# Patient Record
Sex: MALE | URBAN - METROPOLITAN AREA
[De-identification: names, ages, dates, MRNs, and addresses within clinical notes are randomized per-mention and may not be internally consistent; named-entity substitution may affect disease eponyms.]

---

## 2017-02-23 PROBLEM — Z00.00 ENCOUNTER FOR PREVENTIVE HEALTH EXAMINATION: Status: ACTIVE | Noted: 2017-02-23

## 2017-03-17 ENCOUNTER — OUTPATIENT (OUTPATIENT)
Dept: OUTPATIENT SERVICES | Facility: HOSPITAL | Age: 82
LOS: 1 days | Discharge: HOME | End: 2017-03-17

## 2017-03-23 ENCOUNTER — APPOINTMENT (OUTPATIENT)
Dept: UROLOGY | Facility: CLINIC | Age: 82
End: 2017-03-23

## 2017-06-15 ENCOUNTER — APPOINTMENT (OUTPATIENT)
Dept: UROLOGY | Facility: CLINIC | Age: 82
End: 2017-06-15

## 2017-06-15 VITALS
SYSTOLIC BLOOD PRESSURE: 135 MMHG | WEIGHT: 147 LBS | BODY MASS INDEX: 20.58 KG/M2 | DIASTOLIC BLOOD PRESSURE: 69 MMHG | HEART RATE: 56 BPM | HEIGHT: 71 IN

## 2017-06-15 DIAGNOSIS — F17.200 NICOTINE DEPENDENCE, UNSPECIFIED, UNCOMPLICATED: ICD-10-CM

## 2017-06-15 DIAGNOSIS — Z86.79 PERSONAL HISTORY OF OTHER DISEASES OF THE CIRCULATORY SYSTEM: ICD-10-CM

## 2017-06-15 RX ORDER — ASPIRIN 325 MG/1
325 TABLET, FILM COATED ORAL
Refills: 0 | Status: ACTIVE | COMMUNITY

## 2017-06-15 RX ORDER — PRAVASTATIN SODIUM 80 MG/1
TABLET ORAL
Refills: 0 | Status: ACTIVE | COMMUNITY

## 2017-06-15 RX ORDER — SOLIFENACIN SUCCINATE 5 MG/1
5 TABLET, FILM COATED ORAL
Refills: 0 | Status: ACTIVE | COMMUNITY

## 2017-06-27 DIAGNOSIS — R97.21 RISING PSA FOLLOWING TREATMENT FOR MALIGNANT NEOPLASM OF PROSTATE: ICD-10-CM

## 2017-07-20 ENCOUNTER — APPOINTMENT (OUTPATIENT)
Dept: UROLOGY | Facility: CLINIC | Age: 82
End: 2017-07-20

## 2017-07-20 VITALS
BODY MASS INDEX: 20.58 KG/M2 | DIASTOLIC BLOOD PRESSURE: 70 MMHG | HEIGHT: 71 IN | HEART RATE: 62 BPM | SYSTOLIC BLOOD PRESSURE: 120 MMHG | WEIGHT: 147 LBS

## 2017-10-26 ENCOUNTER — APPOINTMENT (OUTPATIENT)
Dept: UROLOGY | Facility: CLINIC | Age: 82
End: 2017-10-26
Payer: MEDICARE

## 2017-10-26 VITALS
HEIGHT: 71 IN | SYSTOLIC BLOOD PRESSURE: 110 MMHG | DIASTOLIC BLOOD PRESSURE: 62 MMHG | HEART RATE: 53 BPM | BODY MASS INDEX: 20.58 KG/M2 | WEIGHT: 147 LBS

## 2017-10-26 DIAGNOSIS — N32.81 OVERACTIVE BLADDER: ICD-10-CM

## 2017-10-26 LAB
BILIRUB UR QL STRIP: NORMAL
CLARITY UR: CLEAR
COLLECTION METHOD: NORMAL
GLUCOSE UR-MCNC: NORMAL
HCG UR QL: NORMAL EU/DL
HGB UR QL STRIP.AUTO: NORMAL
KETONES UR-MCNC: NORMAL
LEUKOCYTE ESTERASE UR QL STRIP: NORMAL
NITRITE UR QL STRIP: NORMAL
PH UR STRIP: 5
PROT UR STRIP-MCNC: 30
SP GR UR STRIP: 1.01

## 2017-10-26 PROCEDURE — 96402 CHEMO HORMON ANTINEOPL SQ/IM: CPT

## 2017-10-26 PROCEDURE — 81003 URINALYSIS AUTO W/O SCOPE: CPT | Mod: QW

## 2017-10-26 PROCEDURE — 99213 OFFICE O/P EST LOW 20 MIN: CPT | Mod: 25

## 2017-10-26 RX ORDER — OXYBUTYNIN CHLORIDE 10 MG/1
10 TABLET, EXTENDED RELEASE ORAL
Qty: 90 | Refills: 3 | Status: ACTIVE | COMMUNITY
Start: 2017-10-26 | End: 1900-01-01

## 2018-01-25 ENCOUNTER — APPOINTMENT (OUTPATIENT)
Dept: UROLOGY | Facility: CLINIC | Age: 83
End: 2018-01-25
Payer: MEDICARE

## 2018-01-25 VITALS
HEART RATE: 62 BPM | SYSTOLIC BLOOD PRESSURE: 128 MMHG | WEIGHT: 147 LBS | DIASTOLIC BLOOD PRESSURE: 78 MMHG | BODY MASS INDEX: 20.58 KG/M2 | HEIGHT: 71 IN

## 2018-01-25 DIAGNOSIS — R97.20 ELEVATED PROSTATE, SPECIFIC ANTIGEN [PSA]: ICD-10-CM

## 2018-01-25 PROCEDURE — 99212 OFFICE O/P EST SF 10 MIN: CPT | Mod: 25

## 2018-01-25 PROCEDURE — 96402 CHEMO HORMON ANTINEOPL SQ/IM: CPT

## 2018-04-26 ENCOUNTER — APPOINTMENT (OUTPATIENT)
Dept: UROLOGY | Facility: CLINIC | Age: 83
End: 2018-04-26

## 2018-05-24 ENCOUNTER — APPOINTMENT (OUTPATIENT)
Dept: UROLOGY | Facility: CLINIC | Age: 83
End: 2018-05-24

## 2018-07-05 ENCOUNTER — APPOINTMENT (OUTPATIENT)
Dept: UROLOGY | Facility: CLINIC | Age: 83
End: 2018-07-05
Payer: MEDICARE

## 2018-07-05 VITALS
HEIGHT: 71 IN | BODY MASS INDEX: 19.6 KG/M2 | HEART RATE: 55 BPM | SYSTOLIC BLOOD PRESSURE: 111 MMHG | WEIGHT: 140 LBS | DIASTOLIC BLOOD PRESSURE: 65 MMHG

## 2018-07-05 PROCEDURE — 99213 OFFICE O/P EST LOW 20 MIN: CPT

## 2018-07-05 RX ORDER — AMLODIPINE BESYLATE AND BENAZEPRIL HYDROCHLORIDE 10; 40 MG/1; MG/1
10-40 CAPSULE ORAL
Qty: 90 | Refills: 0 | Status: ACTIVE | COMMUNITY
Start: 2018-04-19

## 2018-07-05 RX ORDER — AZITHROMYCIN 250 MG/1
250 TABLET, FILM COATED ORAL
Qty: 6 | Refills: 0 | Status: ACTIVE | COMMUNITY
Start: 2018-05-24

## 2018-07-05 RX ORDER — LOSARTAN POTASSIUM 100 MG/1
100 TABLET, FILM COATED ORAL
Qty: 90 | Refills: 0 | Status: ACTIVE | COMMUNITY
Start: 2018-06-21

## 2018-07-05 RX ORDER — TOBRAMYCIN AND DEXAMETHASONE 3; 1 MG/ML; MG/ML
0.3-0.1 SUSPENSION/ DROPS OPHTHALMIC
Qty: 5 | Refills: 0 | Status: ACTIVE | COMMUNITY
Start: 2018-03-01

## 2018-07-31 ENCOUNTER — APPOINTMENT (OUTPATIENT)
Dept: UROLOGY | Facility: CLINIC | Age: 83
End: 2018-07-31

## 2018-10-11 ENCOUNTER — APPOINTMENT (OUTPATIENT)
Dept: UROLOGY | Facility: CLINIC | Age: 83
End: 2018-10-11
Payer: MEDICARE

## 2018-10-11 VITALS
WEIGHT: 135 LBS | SYSTOLIC BLOOD PRESSURE: 113 MMHG | HEART RATE: 50 BPM | BODY MASS INDEX: 18.9 KG/M2 | DIASTOLIC BLOOD PRESSURE: 61 MMHG | HEIGHT: 71 IN

## 2018-10-11 DIAGNOSIS — C61 MALIGNANT NEOPLASM OF PROSTATE: ICD-10-CM

## 2018-10-11 PROCEDURE — 99213 OFFICE O/P EST LOW 20 MIN: CPT

## 2019-01-15 ENCOUNTER — APPOINTMENT (OUTPATIENT)
Dept: UROLOGY | Facility: CLINIC | Age: 84
End: 2019-01-15

## 2022-12-04 ENCOUNTER — APPOINTMENT (EMERGENCY)
Dept: RADIOLOGY | Facility: HOSPITAL | Age: 87
End: 2022-12-04

## 2022-12-04 ENCOUNTER — APPOINTMENT (EMERGENCY)
Dept: CT IMAGING | Facility: HOSPITAL | Age: 87
End: 2022-12-04

## 2022-12-04 ENCOUNTER — HOSPITAL ENCOUNTER (EMERGENCY)
Facility: HOSPITAL | Age: 87
Discharge: HOME | End: 2022-12-05
Attending: EMERGENCY MEDICINE

## 2022-12-04 DIAGNOSIS — U07.1 COVID: ICD-10-CM
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
## 2022-12-04 DIAGNOSIS — T14.8XXA ABRASION: ICD-10-CM

## 2022-12-04 LAB
ANION GAP SERPL CALCULATED.3IONS-SCNC: 12 MMOL/L (ref 4–13)
BASOPHILS # BLD AUTO: 0.01 THOUSANDS/ÂΜL (ref 0–0.1)
BASOPHILS NFR BLD AUTO: 0 % (ref 0–1)
BUN SERPL-MCNC: 28 MG/DL (ref 5–25)
CALCIUM SERPL-MCNC: 9.2 MG/DL (ref 8.4–10.2)
CHLORIDE SERPL-SCNC: 98 MMOL/L (ref 96–108)
CO2 SERPL-SCNC: 24 MMOL/L (ref 21–32)
CREAT SERPL-MCNC: 1.05 MG/DL (ref 0.6–1.3)
EOSINOPHIL # BLD AUTO: 0 THOUSAND/ÂΜL (ref 0–0.61)
EOSINOPHIL NFR BLD AUTO: 0 % (ref 0–6)
ERYTHROCYTE [DISTWIDTH] IN BLOOD BY AUTOMATED COUNT: 14.8 % (ref 11.6–15.1)
GFR SERPL CREATININE-BSD FRML MDRD: 63 ML/MIN/1.73SQ M
GLUCOSE SERPL-MCNC: 90 MG/DL (ref 65–140)
HCT VFR BLD AUTO: 43.4 % (ref 36.5–49.3)
HGB BLD-MCNC: 14.1 G/DL (ref 12–17)
IMM GRANULOCYTES # BLD AUTO: 0.05 THOUSAND/UL (ref 0–0.2)
IMM GRANULOCYTES NFR BLD AUTO: 0 % (ref 0–2)
LYMPHOCYTES # BLD AUTO: 0.59 THOUSANDS/ÂΜL (ref 0.6–4.47)
LYMPHOCYTES NFR BLD AUTO: 4 % (ref 14–44)
MCH RBC QN AUTO: 30.7 PG (ref 26.8–34.3)
MCHC RBC AUTO-ENTMCNC: 32.5 G/DL (ref 31.4–37.4)
MCV RBC AUTO: 95 FL (ref 82–98)
MONOCYTES # BLD AUTO: 1.22 THOUSAND/ÂΜL (ref 0.17–1.22)
MONOCYTES NFR BLD AUTO: 9 % (ref 4–12)
NEUTROPHILS # BLD AUTO: 12.37 THOUSANDS/ÂΜL (ref 1.85–7.62)
NEUTS SEG NFR BLD AUTO: 87 % (ref 43–75)
NRBC BLD AUTO-RTO: 0 /100 WBCS
PLATELET # BLD AUTO: 222 THOUSANDS/UL (ref 149–390)
PMV BLD AUTO: 9.3 FL (ref 8.9–12.7)
POTASSIUM SERPL-SCNC: 4.6 MMOL/L (ref 3.5–5.3)
RBC # BLD AUTO: 4.59 MILLION/UL (ref 3.88–5.62)
SODIUM SERPL-SCNC: 134 MMOL/L (ref 135–147)
WBC # BLD AUTO: 14.24 THOUSAND/UL (ref 4.31–10.16)

## 2022-12-04 RX ORDER — BACITRACIN, NEOMYCIN, POLYMYXIN B 400; 3.5; 5 [USP'U]/G; MG/G; [USP'U]/G
1 OINTMENT TOPICAL ONCE
Status: COMPLETED | OUTPATIENT
Start: 2022-12-04 | End: 2022-12-04

## 2022-12-04 RX ADMIN — SODIUM CHLORIDE 1000 ML: 0.9 INJECTION, SOLUTION INTRAVENOUS at 22:24

## 2022-12-04 RX ADMIN — TETANUS TOXOID, REDUCED DIPHTHERIA TOXOID AND ACELLULAR PERTUSSIS VACCINE, ADSORBED 0.5 ML: 5; 2.5; 8; 8; 2.5 SUSPENSION INTRAMUSCULAR at 23:20

## 2022-12-04 RX ADMIN — BACITRACIN ZINC, NEOMYCIN, POLYMYXIN B 1 SMALL APPLICATION: 400; 3.5; 5 OINTMENT TOPICAL at 22:52

## 2022-12-05 VITALS
SYSTOLIC BLOOD PRESSURE: 137 MMHG | RESPIRATION RATE: 15 BRPM | TEMPERATURE: 98.2 F | HEART RATE: 68 BPM | DIASTOLIC BLOOD PRESSURE: 75 MMHG | OXYGEN SATURATION: 94 %

## 2022-12-05 LAB
ATRIAL RATE: 90 BPM
P AXIS: 82 DEGREES
PR INTERVAL: 224 MS
QRS AXIS: -23 DEGREES
QRSD INTERVAL: 74 MS
QT INTERVAL: 386 MS
QTC INTERVAL: 472 MS
T WAVE AXIS: 80 DEGREES
VENTRICULAR RATE: 90 BPM

## 2022-12-05 NOTE — DISCHARGE INSTRUCTIONS
His CT scan and bloodwork was reassuring against acute injury    Follow up with his PRIMARY PHYSICIAN for the following potential life threatening incidental findings    Abnormal CXR concerning for MALIGNANCY/CANCER  Partially visualized thoracic aneurysm measuring up to 4 cm in diameter  Multiple subcentimeter thyroid nodules  Incidental subcentimeter thyroid nodules identified on this CT

## 2022-12-05 NOTE — ED PROVIDER NOTES
Emergency Department Trauma Note  Joel Mcclendon 81 y o  male MRN: [id number]  Unit/Bed#: ED 14/ED 14 Encounter: [id number]      Trauma Alert: Trauma Acuity: Trauma Evaluation  Model of Arrival: Mode of Arrival: BLS via    Trauma Team: Current Providers  Attending Provider: Paulie Pulliam DO  Registered Nurse: Eufemia Villanueva, RN  Registered Nurse: Yanna Reza RN  Consultants:     None      History of Present Illness     Chief Complaint:   Chief Complaint   Patient presents with   • Fall     Report of fall this evening  Pt alert oriented to place time and self  +head strike  Mult minor abrasions, no deformities noted  Pt denies pain  HPI:  Joel Mcclendon is a 81 y o  male who presents with fall  Mechanism:Details of Incident: Patient was found on floor at nursing home, unwitnessed fall, unknown amount of time patient was on the floor  Injury Date: 12/04/22        81year-old male with history of dementia from nursing home, on anticoagulation, presents with presumed fall  Patient was found sitting down on his bed IV  Has an abrasion to the frontal scalp, as well as her left shoulder and elbow  Patient not oriented, at baseline  Review of Systems   Unable to perform ROS: Dementia       Historical Information     Immunizations:   Immunization History   Administered Date(s) Administered   • Tdap 12/04/2022       Past Medical History:   Diagnosis Date   • Dementia ([facility] [address] )      History reviewed  No pertinent family history  History reviewed  No pertinent surgical history  Social History     Tobacco Use   • Smoking status: Never   • Smokeless tobacco: Never   Substance Use Topics   • Alcohol use: Not Currently   • Drug use: Never     E-Cigarette/Vaping     E-Cigarette/Vaping Substances       Family History: History reviewed  No pertinent family history      Meds/Allergies   None       No Known Allergies    PHYSICAL EXAM    PE limited by: none    Objective   Vitals:   First set: Temperature: 98 2 °F (36 8 °C) (12/04/22 2221)  Pulse: 98 (12/04/22 2221)  Respirations: 16 (12/04/22 2221)  Blood Pressure: 140/94 (12/04/22 2218)  SpO2: (!) 82 % (12/04/22 2222) - error    Primary Survey:   (A) Airway: Intact  (B) Breathing: Intact  (C) Circulation: Pulses:   normal  (D) Disabliity:  GCS Total:  15  (E) Expose:  Completed    Secondary Survey: (Click on Physical Exam tab above)  Physical Exam  Vitals and nursing note reviewed  Constitutional:       Appearance: He is well-developed and well-nourished  HENT:      Head: Normocephalic  Comments: Abrasion on scalp     Mouth/Throat:      Mouth: Oropharynx is clear and moist    Eyes:      Extraocular Movements: EOM normal       Conjunctiva/sclera: Conjunctivae normal       Pupils: Pupils are equal, round, and reactive to light  Neck:      Trachea: No tracheal deviation  Cardiovascular:      Rate and Rhythm: Normal rate and regular rhythm  Heart sounds: Normal heart sounds  No murmur heard  Pulmonary:      Effort: Pulmonary effort is normal  No respiratory distress  Breath sounds: Normal breath sounds  No wheezing or rales  Abdominal:      General: Bowel sounds are normal  There is no distension  Palpations: Abdomen is soft  Tenderness: There is no abdominal tenderness  Musculoskeletal:         General: No deformity or edema  Cervical back: Normal range of motion and neck supple  Comments: Abrasion left humerus, left forearm   Skin:     General: Skin is warm and dry  Capillary Refill: Capillary refill takes less than 2 seconds  Neurological:      Mental Status: He is alert and oriented to person, place, and time  Sensory: No sensory deficit  Psychiatric:         Mood and Affect: Mood and affect normal          Judgment: Judgment normal          Cervical spine cleared by clinical criteria?  No (imaging required)      Invasive Devices     Peripheral Intravenous Line  Duration           Peripheral IV 12/04/22 Proximal;Right;Ventral (anterior) Forearm 1 day                Lab Results:   Results Reviewed     Procedure Component Value Units Date/Time    Basic metabolic panel [[id number]]  (Abnormal) Collected: 12/04/22 2226    Lab Status: Final result Specimen: Blood from Arm, Right Updated: 12/04/22 2248     Sodium 134 mmol/L      Potassium 4 6 mmol/L      Chloride 98 mmol/L      CO2 24 mmol/L      ANION GAP 12 mmol/L      BUN 28 mg/dL      Creatinine 1 05 mg/dL      Glucose 90 mg/dL      Calcium 9 2 mg/dL      eGFR 63 ml/min/1 73sq m     Narrative:      [place] guidelines for Chronic Kidney Disease (CKD):   •  Stage 1 with normal or high GFR (GFR > 90 mL/min/1 73 square meters)  •  Stage 2 Mild CKD (GFR = 60-89 mL/min/1 73 square meters)  •  Stage 3A Moderate CKD (GFR = 45-59 mL/min/1 73 square meters)  •  Stage 3B Moderate CKD (GFR = 30-44 mL/min/1 73 square meters)  •  Stage 4 Severe CKD (GFR = 15-29 mL/min/1 73 square meters)  •  Stage 5 End Stage CKD (GFR <15 mL/min/1 73 square meters)  Note: GFR calculation is accurate only with a steady state creatinine    CBC and differential [[id number]]  (Abnormal) Collected: 12/04/22 2226    Lab Status: Final result Specimen: Blood from Arm, Right Updated: 12/04/22 2231     WBC 14 24 Thousand/uL      RBC 4 59 Million/uL      Hemoglobin 14 1 g/dL      Hematocrit 43 4 %      MCV 95 fL      MCH 30 7 pg      MCHC 32 5 g/dL      RDW 14 8 %      MPV 9 3 fL      Platelets 394 Thousands/uL      nRBC 0 /100 WBCs      Neutrophils Relative 87 %      Immat GRANS % 0 %      Lymphocytes Relative 4 %      Monocytes Relative 9 %      Eosinophils Relative 0 %      Basophils Relative 0 %      Neutrophils Absolute 12 37 Thousands/µL      Immature Grans Absolute 0 05 Thousand/uL      Lymphocytes Absolute 0 59 Thousands/µL      Monocytes Absolute 1 22 Thousand/µL      Eosinophils Absolute 0 00 Thousand/µL      Basophils Absolute 0 01 Thousands/µL Imaging Studies:   Direct to CT: No  TRAUMA - CT spine cervical wo contrast   Final Result by José Miguel Carroll MD (12/04 2342)         1  No cervical spine fracture or traumatic malalignment  2   Partially visualized thoracic aneurysm measuring up to 4 cm in diameter  3   Multiple subcentimeter thyroid nodules  Incidental subcentimeter thyroid nodules identified on this CT  According to guidelines published in the February 2015 white paper on incidental thyroid nodules in the Journal of the [employer] of    Radiology [facility], no further workup is suggested for this  I      Workstation performed: [id number]         TRAUMA - CT head wo contrast   Final Result by José Miguel Carroll MD (12/04 2323)      No acute intracranial abnormality  Extensive white matter change most likely due to chronic microangiopathic change  Workstation performed: [id number]         XR Trauma chest portable   Final Result by José Miguel Carroll MD (12/04 2300)         1  Irregular opacity overlying right upper lobe raising suspicion for neoplasm  CT chest suggested unless patient has prior studies available for comparison  2   Emphysema  3   No acute intrathoracic process  Findings marked for immediate notification in Epic                    Workstation performed: [id number]               Procedures  Procedures         ED Course           MDM  Number of Diagnoses or Management Options  Abrasion: new and requires workup  COVID: new and requires workup  Fall, initial encounter: new and requires workup  Diagnosis management comments: 87YOM with presumed fall  No evidence of torso injury  CT scans reassuring  Negative efast  Vitals, labs reassuring  Patient stable for discharge back to facility  Incidentals including in paperwork       Amount and/or Complexity of Data Reviewed  Review and summarize past medical records: yes  Independent visualization of images, tracings, or specimens: yes    Risk of Complications, Morbidity, and/or Mortality  Presenting problems: high  Diagnostic procedures: minimal  Management options: high            Disposition  Priority One Transfer: No  Final diagnoses:   Fall, initial encounter   Abrasion   COVID     Time reflects when diagnosis was documented in both MDM as applicable and the Disposition within this note     Time User Action Codes Description Comment    12/4/2022 11:56 PM Eli Reynosoa Add [J16  LINDA] Fall, initial encounter     12/4/2022 11:57 PM Lana Newer  8XXA] Abrasion     12/4/2022 11:57 PM Eli April Add [U07 1] [facility]       ED Disposition     ED Disposition   Discharge    Condition   Stable    Date/Time   Sun Dec 4, 2022 11:56 PM    Comment   [facility]  Follow-up Information     Follow up With Specialties Details Why Contact Info Additional [address] [facility] Schedule an appointment as soon as possible for a visit in 1 day As needed [address] [facility] [place]  [place] [id number]  [address] , [address] [facility] [place], [place], [address], [phone]        There are no discharge medications for this patient  No discharge procedures on file      PDMP Review     None          ED Provider  Electronically Signed by         Briana Carlson DO  12/06/22 2060

## 2022-12-05 NOTE — TRANSPORTATION MEDICAL NECESSITY
Section I - General Information    Name of Patient: Chanelle Bauer                 : 1935    Medicare #: [id number]  Transport Date: 22 (PCS is valid for round trips on this date and for all repetitive trips in the 60-day range as noted below )  Origin: [facility] : [facility]   Is the pt's stay covered under Medicare Part A (PPS/DRG)   []     Closest appropriate facility? If no, why is transport to more distant facility required? Yes  If hospice pt, is this transport related to pt's terminal illness? NA       Section II - Medical Necessity Questionnaire  Ambulance transportation is medically necessary only if other means of transport are contraindicated or would be potentially harmful to the patient  To meet this requirement, the patient must either be "bed confined" or suffer from a condition such that transport by means other than ambulance is contraindicated by the patient's condition  The following questions must be answered by the medical professional signing below for this form to be valid:    1)  Describe the MEDICAL CONDITION (physical and/or mental) of this patient AT [address] [place] [address] that requires the patient to be transported in an ambulance and why transport by other means is contraindicated by the patient's conditionConfused/High fall risk non-ambulatory   2) Is the patient "bed confined" as defined below? Yes  To be "be confined" the patient must satisfy all three of the following conditions: (1) unable to get up from bed without Assistance; AND (2) unable to ambulate; AND (3) unable to sit in a chair or wheelchair  3) Can this patient safely be transported by car or wheelchair van (i e , seated during transport without a medical attendant or monitoring)?    No    4) In addition to completing questions 1-3 above, please check any of the following conditions that apply*:   *Note: supporting documentation for any boxes checked must be maintained in the patient's medical records  If hosp-hosp transfer, describe services needed at 2nd facility not available at 1st facility? Contractures    Moderate/severe pain on movement   Medical attendant required       Section III - Signature of Physician or Healthcare Professional  I certify that the above information is true and correct based on my evaluation of this patient, and represent that the patient requires transport by ambulance and that other forms of transport are contraindicated  I understand that this information will be used by the Centers for Medicare and Medicaid Services (CMS) to support the determination of medical necessity for ambulance services, and I represent that I have personal knowledge of the patient's condition at time of transport  []  If this box is checked, I also certify that the patient is physically or mentally incapable of signing the ambulance service's claim and that the institution with which I am affiliated has furnished care, services, or assistance to the patient  My signature below is made on behalf of the patient pursuant to 42 CFR §424 36(b)(4)  In accordance with 42 CFR §424 37, the specific reason(s) that the patient is physically or mentally incapable of signing the claim form is as follows:      Signature of Physician* or Healthcare Professional______________________________________________________________  Signature Date 12/05/22 (For scheduled repetitive transports, this form is not valid for transports performed more than 60 days after this date)    Printed Name & Credentials of Physician or Healthcare Professional (MD, , RN, etc )___Siena Segundo RN_____________________________  *Form must be signed by patient's attending physician for scheduled, repetitive transports   For non-repetitive, unscheduled ambulance transports, if unable to obtain the signature of the attending physician, any of the following may sign (choose appropriate option below)  [] Physician Assistant []  Clinical Nurse Specialist []  Registered Nurse  []  Nurse Practitioner  [x] Discharge Planner

## 2022-12-14 ENCOUNTER — APPOINTMENT (EMERGENCY)
Dept: RADIOLOGY | Facility: HOSPITAL | Age: 87
End: 2022-12-14

## 2022-12-14 ENCOUNTER — HOSPITAL ENCOUNTER (EMERGENCY)
Facility: HOSPITAL | Age: 87
Discharge: HOME | End: 2022-12-14
Attending: INTERNAL MEDICINE

## 2022-12-14 ENCOUNTER — APPOINTMENT (EMERGENCY)
Dept: CT IMAGING | Facility: HOSPITAL | Age: 87
End: 2022-12-14

## 2022-12-14 VITALS
TEMPERATURE: 97 F | DIASTOLIC BLOOD PRESSURE: 76 MMHG | OXYGEN SATURATION: 97 % | WEIGHT: 107 LBS | HEART RATE: 73 BPM | SYSTOLIC BLOOD PRESSURE: 150 MMHG | RESPIRATION RATE: 12 BRPM
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
## 2022-12-14 DIAGNOSIS — S09.90XA INJURY OF HEAD, INITIAL ENCOUNTER: ICD-10-CM

## 2022-12-14 LAB
ALBUMIN SERPL BCP-MCNC: 3.5 G/DL (ref 3.5–5)
ALP SERPL-CCNC: 61 U/L (ref 34–104)
ALT SERPL W P-5'-P-CCNC: 10 U/L (ref 7–52)
ANION GAP SERPL CALCULATED.3IONS-SCNC: 8 MMOL/L (ref 4–13)
AST SERPL W P-5'-P-CCNC: 17 U/L (ref 13–39)
BASOPHILS # BLD AUTO: 0.03 THOUSANDS/ÂΜL (ref 0–0.1)
BASOPHILS NFR BLD AUTO: 0 % (ref 0–1)
BILIRUB SERPL-MCNC: 0.72 MG/DL (ref 0.2–1)
BUN SERPL-MCNC: 15 MG/DL (ref 5–25)
CALCIUM SERPL-MCNC: 9.7 MG/DL (ref 8.4–10.2)
CHLORIDE SERPL-SCNC: 97 MMOL/L (ref 96–108)
CO2 SERPL-SCNC: 28 MMOL/L (ref 21–32)
CREAT SERPL-MCNC: 1.03 MG/DL (ref 0.6–1.3)
EOSINOPHIL # BLD AUTO: 0 THOUSAND/ÂΜL (ref 0–0.61)
EOSINOPHIL NFR BLD AUTO: 0 % (ref 0–6)
ERYTHROCYTE [DISTWIDTH] IN BLOOD BY AUTOMATED COUNT: 14 % (ref 11.6–15.1)
GFR SERPL CREATININE-BSD FRML MDRD: 65 ML/MIN/1.73SQ M
GLUCOSE SERPL-MCNC: 65 MG/DL (ref 65–140)
HCT VFR BLD AUTO: 40.8 % (ref 36.5–49.3)
HGB BLD-MCNC: 13.1 G/DL (ref 12–17)
IMM GRANULOCYTES # BLD AUTO: 0.07 THOUSAND/UL (ref 0–0.2)
IMM GRANULOCYTES NFR BLD AUTO: 1 % (ref 0–2)
LYMPHOCYTES # BLD AUTO: 0.86 THOUSANDS/ÂΜL (ref 0.6–4.47)
LYMPHOCYTES NFR BLD AUTO: 11 % (ref 14–44)
MCH RBC QN AUTO: 29.8 PG (ref 26.8–34.3)
MCHC RBC AUTO-ENTMCNC: 32.1 G/DL (ref 31.4–37.4)
MCV RBC AUTO: 93 FL (ref 82–98)
MONOCYTES # BLD AUTO: 0.98 THOUSAND/ÂΜL (ref 0.17–1.22)
MONOCYTES NFR BLD AUTO: 12 % (ref 4–12)
NEUTROPHILS # BLD AUTO: 6.18 THOUSANDS/ÂΜL (ref 1.85–7.62)
NEUTS SEG NFR BLD AUTO: 76 % (ref 43–75)
NRBC BLD AUTO-RTO: 0 /100 WBCS
PLATELET # BLD AUTO: 283 THOUSANDS/UL (ref 149–390)
PMV BLD AUTO: 9.3 FL (ref 8.9–12.7)
POTASSIUM SERPL-SCNC: 4.2 MMOL/L (ref 3.5–5.3)
PROT SERPL-MCNC: 7.6 G/DL (ref 6.4–8.4)
RBC # BLD AUTO: 4.39 MILLION/UL (ref 3.88–5.62)
SODIUM SERPL-SCNC: 133 MMOL/L (ref 135–147)
WBC # BLD AUTO: 8.12 THOUSAND/UL (ref 4.31–10.16)

## 2022-12-14 RX ADMIN — SODIUM CHLORIDE 500 ML: 0.9 INJECTION, SOLUTION INTRAVENOUS at 19:21

## 2022-12-14 NOTE — ED PROVIDER NOTES
Emergency Department Trauma Note  Kareem Flores 81 y o  male MRN: [id number]  Unit/Bed#: ED 02/ED 02 Encounter: [id number]      Trauma Alert: Trauma Acuity: Trauma Evaluation  Model of Arrival: Mode of Arrival: ALS via    Trauma Team: Current Providers  Attending Provider: Linda Nguyễn DO  Registered Nurse: Kalen Michelle RN  Consultants:     None      History of Present Illness     Chief Complaint:   Chief Complaint   Patient presents with   • Fall     HPI:  Kareem Flores is a 81 y o  male who presents with fall from a standing height     Mechanism:       81year-old who fell from a height he was reaching for the remote  Does not know if there was a head strike  He does take aspirin  Past medical history for dementia  HPI  Review of Systems   Constitutional: Negative for chills and fever  HENT: Negative for rhinorrhea and sore throat  Eyes: Negative for visual disturbance  Respiratory: Negative for cough and shortness of breath  Cardiovascular: Negative for chest pain and leg swelling  Gastrointestinal: Negative for abdominal pain, diarrhea, nausea and vomiting  Genitourinary: Negative for dysuria  Musculoskeletal: Negative for back pain and myalgias  Skin: Positive for pallor  Negative for rash  Neurological: Negative for dizziness and headaches  Psychiatric/Behavioral: Negative for confusion  All other systems reviewed and are negative  Historical Information     Immunizations:   Immunization History   Administered Date(s) Administered   • Tdap 12/04/2022       Past Medical History:   Diagnosis Date   • Dementia ([facility] [address] )      History reviewed  No pertinent family history  History reviewed  No pertinent surgical history    Social History     Tobacco Use   • Smoking status: Never   • Smokeless tobacco: Never   Substance Use Topics   • Alcohol use: Not Currently   • Drug use: Never     E-Cigarette/Vaping     E-Cigarette/Vaping Substances       Family History: non-contributory    Meds/Allergies   None       No Known Allergies    PHYSICAL EXAM    PE limited by: Dementia    Objective   Vitals:   First set: Temperature: (!) 97 4 °F (36 3 °C) (12/14/22 1805)  Pulse: 64 (12/14/22 1805)  Respirations: 16 (12/14/22 1805)  Blood Pressure: 154/78 (12/14/22 1805)  SpO2: 96 % (12/14/22 1805)    Primary Survey:   (A) Airway: Patent  (B) Breathing: Nonlabored  (C) Circulation: Pulses:   normal and carotid  3/4  (D) Disabliity:  GCS Total:  14  (E) Expose:  Completed    Secondary Survey: (Click on Physical Exam tab above)  Physical Exam  Vitals and nursing note reviewed  Constitutional:       General: He is not in acute distress  Appearance: Normal appearance  He is well-developed  He is not ill-appearing or toxic-appearing  Comments: Age-appropriate, but cachectic   HENT:      Head: Normocephalic and atraumatic  Nose: Nose normal       Mouth/Throat:      Mouth: Mucous membranes are dry  Pharynx: No oropharyngeal exudate  Eyes:      General: No scleral icterus  Conjunctiva/sclera: Conjunctivae normal       Pupils: Pupils are equal, round, and reactive to light  Neck:      Vascular: No JVD  Trachea: No tracheal deviation  Cardiovascular:      Rate and Rhythm: Normal rate and regular rhythm  Heart sounds: Normal heart sounds  No murmur heard  Pulmonary:      Effort: Pulmonary effort is normal  No respiratory distress  Breath sounds: Normal breath sounds  No wheezing or rales  Abdominal:      General: Bowel sounds are normal       Palpations: Abdomen is soft  Tenderness: There is no abdominal tenderness  There is no guarding  Musculoskeletal:         General: No tenderness  Normal range of motion  Cervical back: Normal range of motion and neck supple  Skin:     General: Skin is warm and dry  Neurological:      Mental Status: He is alert and oriented to person, place, and time  Cranial Nerves: No cranial nerve deficit  Sensory: No sensory deficit  Motor: No abnormal muscle tone  Comments: 5/5 motor, nl sens   Psychiatric:         Behavior: Behavior normal          Cervical spine cleared by clinical criteria?  No (imaging required)      Invasive Devices     Peripheral Intravenous Line  Duration           Peripheral IV 12/14/22 Right Arm <1 day                Lab Results:   Results Reviewed     Procedure Component Value Units Date/Time    Comprehensive metabolic panel [[id number]]  (Abnormal) Collected: 12/14/22 1840    Lab Status: Final result Specimen: Blood from Arm, Right Updated: 12/14/22 1904     Sodium 133 mmol/L      Potassium 4 2 mmol/L      Chloride 97 mmol/L      CO2 28 mmol/L      ANION GAP 8 mmol/L      BUN 15 mg/dL      Creatinine 1 03 mg/dL      Glucose 65 mg/dL      Calcium 9 7 mg/dL      AST 17 U/L      ALT 10 U/L      Alkaline Phosphatase 61 U/L      Total Protein 7 6 g/dL      Albumin 3 5 g/dL      Total Bilirubin 0 72 mg/dL      eGFR 65 ml/min/1 73sq m     Narrative:      [place] guidelines for Chronic Kidney Disease (CKD):   •  Stage 1 with normal or high GFR (GFR > 90 mL/min/1 73 square meters)  •  Stage 2 Mild CKD (GFR = 60-89 mL/min/1 73 square meters)  •  Stage 3A Moderate CKD (GFR = 45-59 mL/min/1 73 square meters)  •  Stage 3B Moderate CKD (GFR = 30-44 mL/min/1 73 square meters)  •  Stage 4 Severe CKD (GFR = 15-29 mL/min/1 73 square meters)  •  Stage 5 End Stage CKD (GFR <15 mL/min/1 73 square meters)  Note: GFR calculation is accurate only with a steady state creatinine    CBC and differential [[id number]]  (Abnormal) Collected: 12/14/22 1840    Lab Status: Final result Specimen: Blood from Arm, Right Updated: 12/14/22 1845     WBC 8 12 Thousand/uL      RBC 4 39 Million/uL      Hemoglobin 13 1 g/dL      Hematocrit 40 8 %      MCV 93 fL      MCH 29 8 pg      MCHC 32 1 g/dL      RDW 14 0 %      MPV 9 3 fL      Platelets 418 Thousands/uL      nRBC 0 /100 WBCs Neutrophils Relative 76 %      Immat GRANS % 1 %      Lymphocytes Relative 11 %      Monocytes Relative 12 %      Eosinophils Relative 0 %      Basophils Relative 0 %      Neutrophils Absolute 6 18 Thousands/µL      Immature Grans Absolute 0 07 Thousand/uL      Lymphocytes Absolute 0 86 Thousands/µL      Monocytes Absolute 0 98 Thousand/µL      Eosinophils Absolute 0 00 Thousand/µL      Basophils Absolute 0 03 Thousands/µL                  Imaging Studies:   Direct to CT: No  TRAUMA - CT head wo contrast   ED Interpretation by Denis Avalos DO (12/14 1930)   No acute intracranial injury      Final Result by Lara Milton MD (12/14 1918)      No acute intracranial abnormality  Advanced chronic microangiopathy  Workstation performed: [id number]         TRAUMA - CT spine cervical wo contrast   ED Interpretation by Denis Avalos DO (12/14 1931)   No fracture or deformity      Final Result by Lara Milton MD (12/14 1918)      No cervical spine fracture or traumatic malalignment  Workstation performed: [id number]         XR Trauma pelvis ap only 1 or 2 vw   ED Interpretation by Denis Avalos DO (12/14 1932)   No acute fracture or deformity      XR Trauma chest portable   ED Interpretation by Denis Avalos DO (12/14 1933)   Evidence for COPD  No active disease in the chest   In particular no pneumothorax, and no rib fractures appreciated  Procedures  Procedures         ED Course  ED Course as of 12/14/22 2104   Wed Dec 14, 2022   1911 TRAUMA - CT spine cervical wo contrast   1933 There is no significant trauma to the abdomen or pelvis, he is brought here simply for aspirin and possible head strike  FAST exam was not performed  2001 Work-up is negative  Will dress the wound on the top of his head  Discharged back to apartment/personal care in [address] [place] [address]    We will check on his to be level and be sure he is at baseline MDM        Disposition  Priority One Transfer: No  Final diagnoses:   Fall   Injury of head, initial encounter     Time reflects when diagnosis was documented in both MDM as applicable and the Disposition within this note     Time User Action Codes Description Comment    12/14/2022  8:09 PM Pam Host Add [S68  QJUR] Fall     12/14/2022  8:09 PM Pam Host Add [R74 79UU] Head injury     12/14/2022  8:09 PM New Baden Host Add [C70 [id number]] Injury of head, initial encounter     12/14/2022  8:09 PM New Baden Host Remove [S09 90XA] Head injury       ED Disposition     ED Disposition   Discharge    Condition   Stable    Date/Time   Wed Dec 14, 2022  8:09 PM    Comment   Saniya Paci discharge to home/self care  Follow-up Information     Follow up With Specialties Details Why Contact Info    Albino Riley, [facility], Nurse Practitioner  Follow-up as scheduled [id number]  [facility]  [address]  [place]  [address]  [address] [phone]          Patient's Medications    No medications on file     No discharge procedures on file      PDMP Review     None          ED Provider  Electronically Signed by         Lucia Tadeo DO  12/14/22 0682

## 2022-12-15 LAB
ABO GROUP BLD: NORMAL
ATRIAL RATE: 71 BPM
BLD GP AB SCN SERPL QL: NEGATIVE
P AXIS: 90 DEGREES
PR INTERVAL: 212 MS
QRS AXIS: 6 DEGREES
QRSD INTERVAL: 84 MS
QT INTERVAL: 430 MS
QTC INTERVAL: 467 MS
RH BLD: NEGATIVE
SPECIMEN EXPIRATION DATE: NORMAL
T WAVE AXIS: 84 DEGREES
VENTRICULAR RATE: 71 BPM

## 2023-01-01 ENCOUNTER — HOME CARE VISIT (OUTPATIENT)
Dept: HOME HEALTH SERVICES | Facility: HOME HEALTHCARE | Age: 88
End: 2023-01-01
Payer: MEDICARE

## 2023-01-01 ENCOUNTER — HOME CARE VISIT (OUTPATIENT)
Dept: HOME HOSPICE | Facility: HOSPICE | Age: 88
End: 2023-01-01
Payer: MEDICARE

## 2023-01-01 ENCOUNTER — HOSPICE ADMISSION (OUTPATIENT)
Dept: HOME HOSPICE | Facility: HOSPICE | Age: 88
End: 2023-01-01
Payer: MEDICARE

## 2023-01-01 VITALS
SYSTOLIC BLOOD PRESSURE: 60 MMHG | TEMPERATURE: 97.1 F | HEART RATE: 84 BPM | DIASTOLIC BLOOD PRESSURE: 40 MMHG | RESPIRATION RATE: 28 BRPM

## 2023-01-01 PROCEDURE — G0299 HHS/HOSPICE OF RN EA 15 MIN: HCPCS

## 2023-01-01 PROCEDURE — G0156 HHCP-SVS OF AIDE,EA 15 MIN: HCPCS

## 2023-01-01 PROCEDURE — 10330087 HSPC SERVICE INTENSITY ADD-ON

## 2023-01-01 PROCEDURE — 10330057 MEDICATION, GENERAL

## 2023-01-01 PROCEDURE — 10330064 UNDERPAD, REUSE 36X36 1DZ     BECKCL

## 2023-05-07 ENCOUNTER — HOSPITAL ENCOUNTER (EMERGENCY)
Facility: HOSPITAL | Age: 88
Discharge: HOME | End: 2023-05-07
Attending: EMERGENCY MEDICINE

## 2023-05-07 ENCOUNTER — APPOINTMENT (EMERGENCY)
Dept: CT IMAGING | Facility: HOSPITAL | Age: 88
End: 2023-05-07

## 2023-05-07 ENCOUNTER — APPOINTMENT (EMERGENCY)
Dept: RADIOLOGY | Facility: HOSPITAL | Age: 88
End: 2023-05-07

## 2023-05-07 VITALS
TEMPERATURE: 97.6 F | OXYGEN SATURATION: 96 % | SYSTOLIC BLOOD PRESSURE: 138 MMHG | HEART RATE: 46 BPM | RESPIRATION RATE: 21 BRPM | DIASTOLIC BLOOD PRESSURE: 63 MMHG | WEIGHT: 129.19 LBS

## 2023-05-07 DIAGNOSIS — R79.89 ELEVATED SERUM CREATININE: ICD-10-CM

## 2023-05-07 DIAGNOSIS — S09.90XA CLOSED HEAD INJURY, INITIAL ENCOUNTER: Primary | ICD-10-CM

## 2023-05-07 LAB
ANION GAP SERPL CALCULATED.3IONS-SCNC: 10 MMOL/L (ref 4–13)
ATRIAL RATE: 57 BPM
BASOPHILS # BLD AUTO: 0.08 THOUSANDS/ÂΜL (ref 0–0.1)
BASOPHILS NFR BLD AUTO: 1 % (ref 0–1)
BUN SERPL-MCNC: 29 MG/DL (ref 5–25)
CALCIUM SERPL-MCNC: 9.6 MG/DL (ref 8.4–10.2)
CHLORIDE SERPL-SCNC: 102 MMOL/L (ref 96–108)
CO2 SERPL-SCNC: 25 MMOL/L (ref 21–32)
CREAT SERPL-MCNC: 1.39 MG/DL (ref 0.6–1.3)
EOSINOPHIL # BLD AUTO: 0 THOUSAND/ÂΜL (ref 0–0.61)
EOSINOPHIL NFR BLD AUTO: 0 % (ref 0–6)
ERYTHROCYTE [DISTWIDTH] IN BLOOD BY AUTOMATED COUNT: 14.2 % (ref 11.6–15.1)
GFR SERPL CREATININE-BSD FRML MDRD: 45 ML/MIN/1.73SQ M
GLUCOSE SERPL-MCNC: 79 MG/DL (ref 65–140)
HCT VFR BLD AUTO: 42.9 % (ref 36.5–49.3)
HGB BLD-MCNC: 13.7 G/DL (ref 12–17)
IMM GRANULOCYTES # BLD AUTO: 0.07 THOUSAND/UL (ref 0–0.2)
IMM GRANULOCYTES NFR BLD AUTO: 1 % (ref 0–2)
LYMPHOCYTES # BLD AUTO: 1.73 THOUSANDS/ÂΜL (ref 0.6–4.47)
LYMPHOCYTES NFR BLD AUTO: 15 % (ref 14–44)
MCH RBC QN AUTO: 31.5 PG (ref 26.8–34.3)
MCHC RBC AUTO-ENTMCNC: 31.9 G/DL (ref 31.4–37.4)
MCV RBC AUTO: 99 FL (ref 82–98)
MONOCYTES # BLD AUTO: 1.21 THOUSAND/ÂΜL (ref 0.17–1.22)
MONOCYTES NFR BLD AUTO: 11 % (ref 4–12)
NEUTROPHILS # BLD AUTO: 8.38 THOUSANDS/ÂΜL (ref 1.85–7.62)
NEUTS SEG NFR BLD AUTO: 72 % (ref 43–75)
NRBC BLD AUTO-RTO: 0 /100 WBCS
P AXIS: 49 DEGREES
PLATELET # BLD AUTO: 245 THOUSANDS/UL (ref 149–390)
PMV BLD AUTO: 8.7 FL (ref 8.9–12.7)
POTASSIUM SERPL-SCNC: 4.5 MMOL/L (ref 3.5–5.3)
PR INTERVAL: 202 MS
QRS AXIS: -2 DEGREES
QRSD INTERVAL: 86 MS
QT INTERVAL: 462 MS
QTC INTERVAL: 449 MS
RBC # BLD AUTO: 4.35 MILLION/UL (ref 3.88–5.62)
SODIUM SERPL-SCNC: 137 MMOL/L (ref 135–147)
T WAVE AXIS: 65 DEGREES
VENTRICULAR RATE: 57 BPM
WBC # BLD AUTO: 11.47 THOUSAND/UL (ref 4.31–10.16)

## 2023-05-07 RX ORDER — ACETAMINOPHEN 325 MG/1
975 TABLET ORAL ONCE
Status: COMPLETED | OUTPATIENT
Start: 2023-05-07 | End: 2023-05-07

## 2023-05-07 RX ADMIN — ACETAMINOPHEN 975 MG: 325 TABLET ORAL at 04:07

## 2023-05-07 RX ADMIN — SODIUM CHLORIDE 500 ML: 0.9 INJECTION, SOLUTION INTRAVENOUS at 04:15

## 2023-05-07 NOTE — ED NOTES
Received pt resting comfortably, no distress noted  Kamini GREER   [address] [place] [address]  05/07/23 1133

## 2023-05-07 NOTE — DISCHARGE INSTRUCTIONS
Please follow-up with primary care for recheck of kidney function  Your kidney function today does not meet criteria for kidney injury however it is slightly above your baseline  This should be reevaluated with your primary care provider

## 2023-06-13 ENCOUNTER — HOSPITAL ENCOUNTER (EMERGENCY)
Facility: HOSPITAL | Age: 88
Discharge: HOME | End: 2023-06-13
Attending: EMERGENCY MEDICINE
Payer: MEDICARE

## 2023-06-13 ENCOUNTER — APPOINTMENT (EMERGENCY)
Dept: CT IMAGING | Facility: HOSPITAL | Age: 88
End: 2023-06-13
Payer: MEDICARE

## 2023-06-13 VITALS
HEART RATE: 56 BPM | SYSTOLIC BLOOD PRESSURE: 145 MMHG | TEMPERATURE: 97.6 F | OXYGEN SATURATION: 97 % | DIASTOLIC BLOOD PRESSURE: 74 MMHG | RESPIRATION RATE: 18 BRPM

## 2023-06-13 DIAGNOSIS — S32.699A: ICD-10-CM

## 2023-06-13 DIAGNOSIS — R19.7 DIARRHEA: Primary | ICD-10-CM

## 2023-06-13 DIAGNOSIS — I71.9 AORTIC ANEURYSM (HCC): ICD-10-CM

## 2023-06-13 LAB
ALBUMIN SERPL BCP-MCNC: 3.8 G/DL (ref 3.5–5)
ALP SERPL-CCNC: 69 U/L (ref 34–104)
ALT SERPL W P-5'-P-CCNC: 20 U/L (ref 7–52)
ANION GAP SERPL CALCULATED.3IONS-SCNC: 5 MMOL/L (ref 4–13)
ANION GAP SERPL CALCULATED.3IONS-SCNC: 7 MMOL/L (ref 4–13)
AST SERPL W P-5'-P-CCNC: 17 U/L (ref 13–39)
BASOPHILS # BLD MANUAL: 0 THOUSAND/UL (ref 0–0.1)
BASOPHILS NFR MAR MANUAL: 0 % (ref 0–1)
BILIRUB SERPL-MCNC: 0.49 MG/DL (ref 0.2–1)
BUN SERPL-MCNC: 38 MG/DL (ref 5–25)
BUN SERPL-MCNC: 43 MG/DL (ref 5–25)
CALCIUM SERPL-MCNC: 8.6 MG/DL (ref 8.4–10.2)
CALCIUM SERPL-MCNC: 9.8 MG/DL (ref 8.4–10.2)
CHLORIDE SERPL-SCNC: 107 MMOL/L (ref 96–108)
CHLORIDE SERPL-SCNC: 108 MMOL/L (ref 96–108)
CO2 SERPL-SCNC: 22 MMOL/L (ref 21–32)
CO2 SERPL-SCNC: 23 MMOL/L (ref 21–32)
CREAT SERPL-MCNC: 1.15 MG/DL (ref 0.6–1.3)
CREAT SERPL-MCNC: 1.33 MG/DL (ref 0.6–1.3)
EOSINOPHIL # BLD MANUAL: 0.44 THOUSAND/UL (ref 0–0.4)
EOSINOPHIL NFR BLD MANUAL: 3 % (ref 0–6)
ERYTHROCYTE [DISTWIDTH] IN BLOOD BY AUTOMATED COUNT: 15.9 % (ref 11.6–15.1)
GFR SERPL CREATININE-BSD FRML MDRD: 47 ML/MIN/1.73SQ M
GFR SERPL CREATININE-BSD FRML MDRD: 56 ML/MIN/1.73SQ M
GLUCOSE SERPL-MCNC: 75 MG/DL (ref 65–140)
GLUCOSE SERPL-MCNC: 87 MG/DL (ref 65–140)
HCT VFR BLD AUTO: 41.6 % (ref 36.5–49.3)
HGB BLD-MCNC: 13.4 G/DL (ref 12–17)
LIPASE SERPL-CCNC: 31 U/L (ref 11–82)
LYMPHOCYTES # BLD AUTO: 1.9 THOUSAND/UL (ref 0.6–4.47)
LYMPHOCYTES # BLD AUTO: 13 % (ref 14–44)
MCH RBC QN AUTO: 31.8 PG (ref 26.8–34.3)
MCHC RBC AUTO-ENTMCNC: 32.2 G/DL (ref 31.4–37.4)
MCV RBC AUTO: 99 FL (ref 82–98)
METAMYELOCYTES NFR BLD MANUAL: 2 % (ref 0–1)
MONOCYTES # BLD AUTO: 1.46 THOUSAND/UL (ref 0–1.22)
MONOCYTES NFR BLD: 10 % (ref 4–12)
MYELOCYTES NFR BLD MANUAL: 1 % (ref 0–1)
NEUTROPHILS # BLD MANUAL: 10.37 THOUSAND/UL (ref 1.85–7.62)
NEUTS BAND NFR BLD MANUAL: 1 % (ref 0–8)
NEUTS SEG NFR BLD AUTO: 70 % (ref 43–75)
PLATELET # BLD AUTO: 245 THOUSANDS/UL (ref 149–390)
PLATELET BLD QL SMEAR: ADEQUATE
PMV BLD AUTO: 9.1 FL (ref 8.9–12.7)
POTASSIUM SERPL-SCNC: 5.1 MMOL/L (ref 3.5–5.3)
POTASSIUM SERPL-SCNC: 5.4 MMOL/L (ref 3.5–5.3)
PROT SERPL-MCNC: 8.5 G/DL (ref 6.4–8.4)
RBC # BLD AUTO: 4.22 MILLION/UL (ref 3.88–5.62)
RBC MORPH BLD: NORMAL
SODIUM SERPL-SCNC: 135 MMOL/L (ref 135–147)
SODIUM SERPL-SCNC: 137 MMOL/L (ref 135–147)
WBC # BLD AUTO: 14.61 THOUSAND/UL (ref 4.31–10.16)

## 2023-06-13 PROCEDURE — 85007 BL SMEAR W/DIFF WBC COUNT: CPT | Performed by: EMERGENCY MEDICINE

## 2023-06-13 PROCEDURE — G1004 CDSM NDSC: HCPCS

## 2023-06-13 PROCEDURE — 85027 COMPLETE CBC AUTOMATED: CPT | Performed by: EMERGENCY MEDICINE

## 2023-06-13 PROCEDURE — 80048 BASIC METABOLIC PNL TOTAL CA: CPT | Performed by: EMERGENCY MEDICINE

## 2023-06-13 PROCEDURE — 80053 COMPREHEN METABOLIC PANEL: CPT | Performed by: EMERGENCY MEDICINE

## 2023-06-13 PROCEDURE — 83690 ASSAY OF LIPASE: CPT | Performed by: EMERGENCY MEDICINE

## 2023-06-13 PROCEDURE — 36415 COLL VENOUS BLD VENIPUNCTURE: CPT | Performed by: EMERGENCY MEDICINE

## 2023-06-13 PROCEDURE — 74177 CT ABD & PELVIS W/CONTRAST: CPT

## 2023-06-13 RX ORDER — PRAVASTATIN SODIUM 80 MG/1
80 TABLET ORAL
COMMUNITY

## 2023-06-13 RX ORDER — SENNA PLUS 8.6 MG/1
1 TABLET ORAL DAILY
COMMUNITY

## 2023-06-13 RX ORDER — LOPERAMIDE HYDROCHLORIDE 2 MG/1
2 CAPSULE ORAL 4 TIMES DAILY PRN
Qty: 12 CAPSULE | Refills: 0 | Status: SHIPPED | OUTPATIENT
Start: 2023-06-13

## 2023-06-13 RX ORDER — PHENOL 1.4 %
600 AEROSOL, SPRAY (ML) MUCOUS MEMBRANE DAILY
COMMUNITY

## 2023-06-13 RX ORDER — MELATONIN
1000 DAILY
COMMUNITY

## 2023-06-13 RX ORDER — FERROUS SULFATE 325(65) MG
325 TABLET ORAL
COMMUNITY

## 2023-06-13 RX ORDER — MULTIVIT WITH MINERALS/LUTEIN
500 TABLET ORAL 2 TIMES DAILY
COMMUNITY

## 2023-06-13 RX ORDER — LORATADINE 10 MG/1
10 TABLET ORAL DAILY
COMMUNITY

## 2023-06-13 RX ORDER — BUPROPION HYDROCHLORIDE 150 MG/1
150 TABLET, EXTENDED RELEASE ORAL 2 TIMES DAILY
COMMUNITY

## 2023-06-13 RX ORDER — QUETIAPINE FUMARATE 25 MG/1
25 TABLET, FILM COATED ORAL 2 TIMES DAILY
COMMUNITY

## 2023-06-13 RX ORDER — LORAZEPAM 0.5 MG/1
0.5 TABLET ORAL EVERY 6 HOURS PRN
COMMUNITY

## 2023-06-13 RX ORDER — MEGESTROL ACETATE 40 MG/ML
400 SUSPENSION ORAL 2 TIMES DAILY
COMMUNITY

## 2023-06-13 RX ADMIN — SODIUM CHLORIDE 500 ML: 0.9 INJECTION, SOLUTION INTRAVENOUS at 07:45

## 2023-06-13 RX ADMIN — IOHEXOL 100 ML: 350 INJECTION, SOLUTION INTRAVENOUS at 08:10

## 2023-06-13 NOTE — DISCHARGE INSTRUCTIONS
-Today he was evaluated for diarrhea  Likely viral GI bug is causing his symptoms  Blood work and CT scan are unremarkable regarding this     -He was found to have an incidental left-sided pelvic fracture, he has no pain from this  Discussed with orthopedics and please follow-up with Dr Christina Edgar in the office    -He was also found to have an aortic aneurysm in his abdomen that will require outpatient vascular surgery evaluation    Please follow-up with them

## 2023-06-13 NOTE — ED PROVIDER NOTES
History  Chief Complaint   Patient presents with   • Diarrhea     Patient comes in for diarrhea for the past few days  Denies any pain          81YEAR-OLD MALE      Chief complaint:   Diarrhea     HPI  PATIENT IS HERE FOR 3 days of Diarrhea    CAME ON GRADUALLY    HE DOES NOT HAVE ANY ABDOMINAL PAIN      ASSOCIATED SYMPTOMS:  URINARY  SYMPTOMS: THERE IS NO DYSURIA, NO HEMATURIA, NO FREQUENCY  HE DENIES NAUSEA, VOMITING  DENIES FEVERS, CHILLS    NO BLOODY STOOLS - NOT BLACK OR BLOODY  PT DOES NOT THINK HE HAS FOOD POISONING   NO SICK CONTACTS  NO TRAVEL  NO RECENT ANTIBIOTICS      ALLEVIATING OR EXACERBATING FACTORS:  UNCERTAIN            History provided by:  Patient  Diarrhea  Severity:  Moderate  Onset quality:  Gradual  Relieved by:  Nothing  Worsened by:  Nothing  Ineffective treatments:  None tried  Associated symptoms: no abdominal pain, no arthralgias, no chills, no recent cough, no diaphoresis, no fever, no headaches, no myalgias, no URI and no vomiting    Risk factors: no recent antibiotic use        Prior to Admission Medications   Prescriptions Last Dose Informant Patient Reported? Taking?    Aspirin 81 MG CAPS   Yes Yes   Sig: Take 81 mg by mouth daily   LORazepam (ATIVAN) 0 5 mg tablet   Yes Yes   Sig: Take 0 5 mg by mouth every 6 (six) hours as needed for anxiety   PANTOPRAZOLE SODIUM PO   Yes Yes   Sig: Take 40 mg by mouth in the morning   QUEtiapine (SEROquel) 25 mg tablet   Yes Yes   Sig: Take 25 mg by mouth 2 (two) times a day   ascorbic acid (VITAMIN C) 250 mg tablet   Yes Yes   Sig: Take 500 mg by mouth 2 (two) times a day   buPROPion (WELLBUTRIN SR) 150 mg 12 hr tablet   Yes Yes   Sig: Take 150 mg by mouth 2 (two) times a day   calcium carbonate (OS-URSULA) 600 MG tablet   Yes Yes   Sig: Take 600 mg by mouth daily   cholecalciferol (VITAMIN D3) 1,000 units tablet   Yes Yes   Sig: Take 1,000 Units by mouth daily   ferrous sulfate 325 (65 Fe) mg tablet   Yes Yes   Sig: Take 325 mg by mouth daily with breakfast   loratadine (CLARITIN) 10 mg tablet   Yes Yes   Sig: Take 10 mg by mouth daily   megestrol (MEGACE) 40 MG/ML suspension   Yes Yes   Sig: Take 400 mg by mouth 2 (two) times a day   pravastatin (PRAVACHOL) 80 mg tablet   Yes Yes   Sig: Take 80 mg by mouth daily at bedtime   senna (SENOKOT) 8 6 MG tablet   Yes Yes   Sig: Take 1 tablet by mouth daily      Facility-Administered Medications: None       Past Medical History:   Diagnosis Date   • Anemia    • Anxiety    • Dementia (HCC)    • Depression    • Hyperlipidemia    • Hypertension        History reviewed  No pertinent surgical history  History reviewed  No pertinent family history  I have reviewed and agree with the history as documented  E-Cigarette/Vaping     E-Cigarette/Vaping Substances     Social History     Tobacco Use   • Smoking status: Never   • Smokeless tobacco: Never   Substance Use Topics   • Alcohol use: Not Currently   • Drug use: Never       Review of Systems   Constitutional: Negative for chills, diaphoresis, fatigue and fever  HENT: Negative for rhinorrhea, sinus pressure, sinus pain, sneezing, sore throat, trouble swallowing and voice change  Respiratory: Negative for cough, shortness of breath, wheezing and stridor  Cardiovascular: Negative for chest pain, palpitations and leg swelling  Gastrointestinal: Positive for diarrhea  Negative for abdominal pain, blood in stool, constipation, nausea and vomiting  Genitourinary: Negative for difficulty urinating, dysuria, flank pain and frequency  Musculoskeletal: Negative for arthralgias, back pain, gait problem, joint swelling, myalgias, neck pain and neck stiffness  Skin: Negative for rash and wound  Neurological: Negative for dizziness, light-headedness and headaches  All other systems reviewed and are negative  Physical Exam  Physical Exam  Constitutional:       General: He is not in acute distress  Appearance: Normal appearance   He is well-developed  He is not ill-appearing, toxic-appearing or diaphoretic  HENT:      Head: Normocephalic and atraumatic  Right Ear: External ear normal       Left Ear: External ear normal       Nose: Nose normal  No congestion or rhinorrhea  Mouth/Throat:      Pharynx: No oropharyngeal exudate or posterior oropharyngeal erythema  Eyes:      General: No scleral icterus  Right eye: No discharge  Left eye: No discharge  Extraocular Movements: Extraocular movements intact  Conjunctiva/sclera: Conjunctivae normal       Pupils: Pupils are equal, round, and reactive to light  Neck:      Vascular: No JVD  Trachea: No tracheal deviation  Cardiovascular:      Rate and Rhythm: Normal rate and regular rhythm  Pulses: Normal pulses  Heart sounds: Normal heart sounds  No murmur heard  No friction rub  No gallop  Pulmonary:      Effort: Pulmonary effort is normal  No respiratory distress  Breath sounds: Normal breath sounds  No stridor  No wheezing, rhonchi or rales  Chest:      Chest wall: No tenderness  Abdominal:      General: Bowel sounds are normal  There is no distension  Palpations: Abdomen is soft  There is no mass  Tenderness: There is no abdominal tenderness  There is no right CVA tenderness, left CVA tenderness, guarding or rebound  Hernia: No hernia is present  Musculoskeletal:         General: No swelling, tenderness, deformity or signs of injury  Normal range of motion  Cervical back: Normal range of motion and neck supple  No rigidity or tenderness  Right lower leg: No edema  Left lower leg: No edema  Lymphadenopathy:      Cervical: No cervical adenopathy  Skin:     General: Skin is warm  Capillary Refill: Capillary refill takes less than 2 seconds  Coloration: Skin is not jaundiced or pale  Findings: No bruising, erythema, lesion or rash  Neurological:      General: No focal deficit present  Mental Status: He is alert and oriented to person, place, and time  Mental status is at baseline  Cranial Nerves: No cranial nerve deficit  Sensory: No sensory deficit  Motor: No weakness or abnormal muscle tone  Coordination: Coordination normal    Psychiatric:         Mood and Affect: Mood normal          Behavior: Behavior normal          Thought Content: Thought content normal          Judgment: Judgment normal          Vital Signs  ED Triage Vitals [06/13/23 0643]   Temperature Pulse Respirations Blood Pressure SpO2   97 6 °F (36 4 °C) 59 18 139/73 96 %      Temp Source Heart Rate Source Patient Position - Orthostatic VS BP Location FiO2 (%)   Tympanic -- Sitting Left arm --      Pain Score       No Pain           Vitals:    06/13/23 0643   BP: 139/73   Pulse: 59   Patient Position - Orthostatic VS: Sitting         Visual Acuity      ED Medications  Medications - No data to display    Diagnostic Studies  Results Reviewed     None                 No orders to display              Procedures  Procedures         ED Course  ED Course as of 06/13/23 0658 Tue Jun 13, 2023 0658 Sign out:     Dr Pete Matthews to f/u on labs and reassess                               SBIRT 20yo+    Flowsheet Row Most Recent Value   Initial Alcohol Screen: US AUDIT-C     1  How often do you have a drink containing alcohol? 0 Filed at: 06/13/2023 0644   2  How many drinks containing alcohol do you have on a typical day you are drinking? 0 Filed at: 06/13/2023 0644   3a  Male UNDER 65: How often do you have five or more drinks on one occasion? 0 Filed at: 06/13/2023 0644   3b  FEMALE Any Age, or MALE 65+: How often do you have 4 or more drinks on one occassion? 0 Filed at: 06/13/2023 0644   Audit-C Score 0 Filed at: 06/13/2023 0567   CRISTINO: How many times in the past year have you    Used an illegal drug or used a prescription medication for non-medical reasons?  Never Filed at: 06/13/2023 7517 Medical Decision Making    82 yo M  Here for 3 days of diarrhea, non bloody  No n/v  No fever/chills  No abdominal pain     dispo pending at time of sign out     Diarrhea: acute illness or injury      Disposition  Final diagnoses:   None     ED Disposition     None      Follow-up Information    None         Patient's Medications   Discharge Prescriptions    No medications on file       No discharge procedures on file      PDMP Review     None          ED Provider  Electronically Signed by           Tin Tejada MD  06/13/23 5379

## 2023-06-27 ENCOUNTER — APPOINTMENT (EMERGENCY)
Dept: CT IMAGING | Facility: HOSPITAL | Age: 88
End: 2023-06-27
Payer: MEDICARE

## 2023-06-27 ENCOUNTER — APPOINTMENT (EMERGENCY)
Dept: RADIOLOGY | Facility: HOSPITAL | Age: 88
End: 2023-06-27
Payer: MEDICARE

## 2023-06-27 ENCOUNTER — HOSPITAL ENCOUNTER (EMERGENCY)
Facility: HOSPITAL | Age: 88
Discharge: HOME | End: 2023-06-28
Attending: EMERGENCY MEDICINE
Payer: MEDICARE
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
## 2023-06-27 DIAGNOSIS — M79.602 LEFT ARM PAIN: ICD-10-CM

## 2023-06-27 DIAGNOSIS — R91.8 PULMONARY NODULES: ICD-10-CM

## 2023-06-27 LAB
ABO GROUP BLD: NORMAL
ANION GAP SERPL CALCULATED.3IONS-SCNC: 8 MMOL/L
APTT PPP: 25 SECONDS (ref 23–37)
BASOPHILS # BLD MANUAL: 0 THOUSAND/UL (ref 0–0.1)
BASOPHILS NFR MAR MANUAL: 0 % (ref 0–1)
BLD GP AB SCN SERPL QL: NEGATIVE
BUN SERPL-MCNC: 52 MG/DL (ref 5–25)
CALCIUM SERPL-MCNC: 9.2 MG/DL (ref 8.4–10.2)
CHLORIDE SERPL-SCNC: 110 MMOL/L (ref 96–108)
CO2 SERPL-SCNC: 19 MMOL/L (ref 21–32)
CREAT SERPL-MCNC: 1.48 MG/DL (ref 0.6–1.3)
EOSINOPHIL # BLD MANUAL: 0.14 THOUSAND/UL (ref 0–0.4)
EOSINOPHIL NFR BLD MANUAL: 1 % (ref 0–6)
ERYTHROCYTE [DISTWIDTH] IN BLOOD BY AUTOMATED COUNT: 16.4 % (ref 11.6–15.1)
GFR SERPL CREATININE-BSD FRML MDRD: 41 ML/MIN/1.73SQ M
GLUCOSE SERPL-MCNC: 67 MG/DL (ref 65–140)
HCT VFR BLD AUTO: 40 % (ref 36.5–49.3)
HGB BLD-MCNC: 12.6 G/DL (ref 12–17)
INR PPP: 1.09 (ref 0.84–1.19)
LYMPHOCYTES # BLD AUTO: 15 % (ref 14–44)
LYMPHOCYTES # BLD AUTO: 2.12 THOUSAND/UL (ref 0.6–4.47)
MCH RBC QN AUTO: 32.1 PG (ref 26.8–34.3)
MCHC RBC AUTO-ENTMCNC: 31.5 G/DL (ref 31.4–37.4)
MCV RBC AUTO: 102 FL (ref 82–98)
METAMYELOCYTES NFR BLD MANUAL: 2 % (ref 0–1)
MONOCYTES # BLD AUTO: 1.69 THOUSAND/UL (ref 0–1.22)
MONOCYTES NFR BLD: 12 % (ref 4–12)
MYELOCYTES NFR BLD MANUAL: 1 % (ref 0–1)
NEUTROPHILS # BLD MANUAL: 9.59 THOUSAND/UL (ref 1.85–7.62)
NEUTS BAND NFR BLD MANUAL: 3 % (ref 0–8)
NEUTS SEG NFR BLD AUTO: 65 % (ref 43–75)
PLATELET # BLD AUTO: 271 THOUSANDS/UL (ref 149–390)
PLATELET BLD QL SMEAR: ADEQUATE
PMV BLD AUTO: 9.4 FL (ref 8.9–12.7)
POTASSIUM SERPL-SCNC: 5 MMOL/L (ref 3.5–5.3)
PROTHROMBIN TIME: 14.1 SECONDS (ref 11.6–14.5)
RBC # BLD AUTO: 3.93 MILLION/UL (ref 3.88–5.62)
RBC MORPH BLD: NORMAL
RH BLD: NEGATIVE
SODIUM SERPL-SCNC: 137 MMOL/L (ref 135–147)
SPECIMEN EXPIRATION DATE: NORMAL
VARIANT LYMPHS # BLD AUTO: 1 %
WBC # BLD AUTO: 14.11 THOUSAND/UL (ref 4.31–10.16)

## 2023-06-27 PROCEDURE — 85610 PROTHROMBIN TIME: CPT

## 2023-06-27 PROCEDURE — 36415 COLL VENOUS BLD VENIPUNCTURE: CPT

## 2023-06-27 PROCEDURE — 70450 CT HEAD/BRAIN W/O DYE: CPT

## 2023-06-27 PROCEDURE — 85027 COMPLETE CBC AUTOMATED: CPT

## 2023-06-27 PROCEDURE — 85007 BL SMEAR W/DIFF WBC COUNT: CPT

## 2023-06-27 PROCEDURE — 96360 HYDRATION IV INFUSION INIT: CPT

## 2023-06-27 PROCEDURE — 86900 BLOOD TYPING SEROLOGIC ABO: CPT

## 2023-06-27 PROCEDURE — 71250 CT THORAX DX C-: CPT

## 2023-06-27 PROCEDURE — 99284 EMERGENCY DEPT VISIT MOD MDM: CPT

## 2023-06-27 PROCEDURE — 85730 THROMBOPLASTIN TIME PARTIAL: CPT

## 2023-06-27 PROCEDURE — 80048 BASIC METABOLIC PNL TOTAL CA: CPT

## 2023-06-27 PROCEDURE — 86901 BLOOD TYPING SEROLOGIC RH(D): CPT

## 2023-06-27 PROCEDURE — 71045 X-RAY EXAM CHEST 1 VIEW: CPT

## 2023-06-27 PROCEDURE — 73060 X-RAY EXAM OF HUMERUS: CPT

## 2023-06-27 PROCEDURE — 72125 CT NECK SPINE W/O DYE: CPT

## 2023-06-27 PROCEDURE — 86850 RBC ANTIBODY SCREEN: CPT

## 2023-06-27 PROCEDURE — 72170 X-RAY EXAM OF PELVIS: CPT

## 2023-06-27 RX ORDER — ACETAMINOPHEN 325 MG/1
650 TABLET ORAL ONCE
Status: COMPLETED | OUTPATIENT
Start: 2023-06-27 | End: 2023-06-27

## 2023-06-27 RX ADMIN — ACETAMINOPHEN 650 MG: 325 TABLET ORAL at 21:37

## 2023-06-27 RX ADMIN — Medication 4 G: at 21:37

## 2023-06-27 RX ADMIN — SODIUM CHLORIDE 500 ML: 0.9 INJECTION, SOLUTION INTRAVENOUS at 21:38

## 2023-06-28 VITALS
WEIGHT: 128.97 LBS | OXYGEN SATURATION: 98 % | RESPIRATION RATE: 18 BRPM | SYSTOLIC BLOOD PRESSURE: 121 MMHG | DIASTOLIC BLOOD PRESSURE: 58 MMHG | HEIGHT: 72 IN | HEART RATE: 76 BPM | TEMPERATURE: 98.7 F | BODY MASS INDEX: 17.47 KG/M2

## 2023-06-28 LAB
BACTERIA UR QL AUTO: NORMAL /HPF
BILIRUB UR QL STRIP: NEGATIVE
CLARITY UR: CLEAR
COLOR UR: YELLOW
GLUCOSE SERPL-MCNC: 118 MG/DL (ref 65–140)
GLUCOSE UR STRIP-MCNC: NEGATIVE MG/DL
HGB UR QL STRIP.AUTO: NEGATIVE
KETONES UR STRIP-MCNC: NEGATIVE MG/DL
LEUKOCYTE ESTERASE UR QL STRIP: NEGATIVE
NITRITE UR QL STRIP: NEGATIVE
NON-SQ EPI CELLS URNS QL MICRO: NORMAL /HPF
PH UR STRIP.AUTO: 5.5 [PH]
PROT UR STRIP-MCNC: ABNORMAL MG/DL
RBC #/AREA URNS AUTO: NORMAL /HPF
SP GR UR STRIP.AUTO: >=1.03
UROBILINOGEN UR QL STRIP.AUTO: 0.2 E.U./DL
WBC #/AREA URNS AUTO: NORMAL /HPF

## 2023-06-28 PROCEDURE — 82948 REAGENT STRIP/BLOOD GLUCOSE: CPT

## 2023-06-28 PROCEDURE — 81001 URINALYSIS AUTO W/SCOPE: CPT

## 2023-06-28 PROCEDURE — 96361 HYDRATE IV INFUSION ADD-ON: CPT

## 2023-06-28 RX ADMIN — SODIUM CHLORIDE 250 ML: 0.9 INJECTION, SOLUTION INTRAVENOUS at 01:20

## 2023-06-28 NOTE — ED CARE HANDOFF
Emergency Department Sign Out Note        Sign out and transfer of care from [facility]. See Separate Emergency Department note. The patient, Tomi Eller, was evaluated by the previous provider for Trauma Eval.    Workup Completed:  Trauma eval    ED Course / Workup Pending (followup):                                      ED Course as of 06/28/23 0742   Tue Jun 27, 2023   2155 Sign out:     Pt fell pta, was a trauma Eval   Awaiting CT chest to complete pt's trauma evaluation     Need UA to further evaluate the Leukocytosis    2204 Pt seen and evaluated  Pt is stable  Non toxic  Comfortable and w/o focal sources of pain  Moving all extremities    2306 RBC Morphology Reflex Test   2306 CBC and differential(!)   2306 Protime-INR   2306 Type and screen   0867 Basic metabolic panel(!)   0206 CT CHEST WITHOUT IV CONTRAST         IMPRESSION:     1. Nonspecific 2.2 x 1.4 cm spiculated nodule in the subpleural region of the right upper lobe noted with surrounding hazy parenchymal opacity. Recommend follow-up CT chest at 3 months, PET/CT, or tissue sampling per current Fleischner Society   guidelines. 2.  There is paraseptal predominant emphysema bilaterally. Mild dependent atelectasis in the lung bases. No pneumothorax, lobar airspace consolidation, or pleural effusions. 3.  A few nonspecific mildly prominent mediastinal/hilar lymph nodes noted with the largest measuring up to 1.5 x 1.0 cm.  4.  Mildly dilated ascending and descending thoracic aorta measuring up to 3.9 cm in diameter. Extensive calcific atherosclerosis of the thoracic aorta, proximal thoracic great vessels, and coronary arteries. Other ancillary findings detailed above. Wed Jun 28, 2023   0012 Pt stable    Still awaiting UA    Pt does not seem to be in any position to get home  Will dw son    46 Called pt's son, Tomi Eller  He understands work up results  Pt needs nodules to be followed up in 3 months.  Son understands     Son states that usually ambulance can take him back to the [facility]    0224 UA w Reflex to Microscopic w Reflex to Culture(!)   0224 Urine Microscopic   0224 Urine Microscopic     Procedures  MDM        Disposition  Final diagnoses:   None     ED Disposition     None      Follow-up Information    None       Patient's Medications   Discharge Prescriptions    No medications on file     No discharge procedures on file.        ED Provider  Electronically Signed by     Do Leggett MD  06/28/23 8145

## 2023-06-28 NOTE — ED PROVIDER NOTES
Emergency Department Trauma Note  Roni Pace 81 y.o. male MRN: [id number]  Unit/Bed#: ED 02/ED 02 Encounter: [id number]      Trauma Alert: Trauma Acuity: Trauma Evaluation  Model of Arrival: Mode of Arrival: BLS via  EMS  Trauma Team: Current Providers  Attending Provider: Rickie Blackwell DO  Attending Provider: Gisella Bradley MD  Registered Nurse: Lele Leach, RN  Physician Assistant: Zaria Thomas PA-C  Consultants:     None      History of Present Illness     Chief Complaint:   Chief Complaint   Patient presents with   • Fall     Per EMS pt fell at approximately 7277, trip and fall while trying to get in recliner, + aspirin, - LOC     HPI:  Roni Pace is a 81 y.o. male who presents with fall, head injury, left upper arm pain. Mechanism:Details of Incident: patient tripped and fell backwards with moving to recliner Injury Date: 06/27/23 Injury Time: 1845 Injury [address] [place]: [facility]    27-year-old male with history of dementia who presents to the emergency department for evaluation after falling at the nursing facility. Patient does have a history of dementia but is completely oriented and alert during my evaluation and is answering questions appropriately. He does recall the summary of the events. He states that he was transferring himself to his recliner but missed and fell backwards. He does report that he hit the back of his head on the ground but does not have any headache or dizziness at this time. He is on aspirin but no other blood thinners. Patient's only complaint at this time is left arm pain localized to the upper arm where there is a small abrasion to the posterior aspect of upper left arm. Does not report any loss of consciousness or seizure activity. Denies chest pain, abdominal pain, nausea, vomiting, headache, dizziness, focal weakness, numbness/tingling.   He states that he had no lightheadedness or weakness prior to falling. No recent medication changes. Trauma eval was called prehospital due to patient's head injury on aspirin. History provided by:  Patient   used: No    Fall  Associated symptoms: no abdominal pain, no back pain, no chest pain, no headaches, no seizures and no vomiting      Review of Systems   Constitutional: Negative for chills and fever. HENT: Negative for ear pain and sore throat. Eyes: Negative for pain and visual disturbance. Respiratory: Negative for cough and shortness of breath. Cardiovascular: Negative for chest pain and palpitations. Gastrointestinal: Negative for abdominal pain and vomiting. Genitourinary: Negative for dysuria and hematuria. Musculoskeletal: Positive for arthralgias. Negative for back pain. Skin: Positive for wound. Negative for color change and rash. Neurological: Negative for dizziness, seizures, syncope and headaches. All other systems reviewed and are negative. Historical Information     Immunizations:   Immunization History   Administered Date(s) Administered   • Tdap 12/04/2022       Past Medical History:   Diagnosis Date   • Anemia    • Anxiety    • Dementia ([address] [facility])    • Depression    • Hyperlipidemia    • Hypertension      History reviewed. No pertinent family history. History reviewed. No pertinent surgical history. Social History     Tobacco Use   • Smoking status: Never   • Smokeless tobacco: Never   Substance Use Topics   • Alcohol use: Not Currently   • Drug use: Never     E-Cigarette/Vaping     E-Cigarette/Vaping Substances       Family History: non-contributory    Meds/Allergies   Prior to Admission Medications   Prescriptions Last Dose Informant Patient Reported? Taking?    Aspirin 81 MG CAPS   Yes No   Sig: Take 81 mg by mouth daily   LORazepam (ATIVAN) 0.5 mg tablet   Yes No   Sig: Take 0.5 mg by mouth every 6 (six) hours as needed for anxiety   PANTOPRAZOLE SODIUM PO   Yes No   Sig: Take 40 mg by mouth in the morning   QUEtiapine (SEROquel) 25 mg tablet   Yes No   Sig: Take 25 mg by mouth 2 (two) times a day   ascorbic acid (VITAMIN C) 250 mg tablet   Yes No   Sig: Take 500 mg by mouth 2 (two) times a day   buPROPion (WELLBUTRIN SR) 150 mg 12 hr tablet   Yes No   Sig: Take 150 mg by mouth 2 (two) times a day   calcium carbonate (OS-URSULA) 600 MG tablet   Yes No   Sig: Take 600 mg by mouth daily   cholecalciferol (VITAMIN D3) 1,000 units tablet   Yes No   Sig: Take 1,000 Units by mouth daily   ferrous sulfate 325 (65 Fe) mg tablet   Yes No   Sig: Take 325 mg by mouth daily with breakfast   loperamide (IMODIUM) 2 mg capsule   No No   Sig: Take 1 capsule (2 mg total) by mouth 4 (four) times a day as needed for diarrhea   loratadine (CLARITIN) 10 mg tablet   Yes No   Sig: Take 10 mg by mouth daily   megestrol (MEGACE) 40 MG/ML suspension   Yes No   Sig: Take 400 mg by mouth 2 (two) times a day   pravastatin (PRAVACHOL) 80 mg tablet   Yes No   Sig: Take 80 mg by mouth daily at bedtime   senna (SENOKOT) 8.6 MG tablet   Yes No   Sig: Take 1 tablet by mouth daily      Facility-Administered Medications: None       No Known Allergies    PHYSICAL EXAM    PE limited by: not limited    Objective   Vitals:   First set: Temperature: 98.7 °F (37.1 °C) (06/27/23 1931)  Pulse: 100 (06/27/23 1927)  Respirations: 18 (06/27/23 1927)  Blood Pressure: 118/65 (06/27/23 1927)  SpO2: 98 % (06/27/23 1927)    Primary Survey:   (A) Airway: patent  (B) Breathing: CTA b/l  (C) Circulation: Pulses:   normal  (D) Disabliity:  GCS Total:  15  (E) Expose:  Completed    Secondary Survey: (Click on Physical Exam tab above)  Physical Exam  Vitals and nursing note reviewed. Constitutional:       General: He is not in acute distress. Appearance: Normal appearance. He is well-developed and normal weight. He is not ill-appearing. HENT:      Head: Normocephalic and atraumatic.       Right Ear: Tympanic membrane and external ear normal.      Left Ear: Tympanic membrane and external ear normal.      Nose: Nose normal.      Mouth/Throat:      Mouth: Mucous membranes are moist.      Pharynx: Oropharynx is clear. Eyes:      General: No scleral icterus. Right eye: No discharge. Left eye: No discharge. Conjunctiva/sclera: Conjunctivae normal.      Pupils: Pupils are equal, round, and reactive to light. Cardiovascular:      Rate and Rhythm: Normal rate. Pulses: Normal pulses. Heart sounds: Normal heart sounds. Pulmonary:      Effort: Pulmonary effort is normal. No respiratory distress. Breath sounds: Normal breath sounds. Chest:      Chest wall: No tenderness. Abdominal:      General: Abdomen is flat. Bowel sounds are normal.      Palpations: Abdomen is soft. Tenderness: There is no abdominal tenderness. There is no right CVA tenderness or left CVA tenderness. Musculoskeletal:         General: Signs of injury present. No swelling or tenderness. Normal range of motion. Arms:       Cervical back: Normal range of motion and neck supple. No rigidity. Skin:     General: Skin is warm and dry. Capillary Refill: Capillary refill takes less than 2 seconds. Findings: No bruising, erythema or rash. Neurological:      General: No focal deficit present. Mental Status: He is alert and oriented to person, place, and time. Mental status is at baseline. Motor: No weakness. Psychiatric:         Mood and Affect: Mood normal.         Behavior: Behavior normal.         Thought Content: Thought content normal.         Cervical spine cleared by clinical criteria?  No (imaging required)      Invasive Devices     Peripheral Intravenous Line  Duration           Peripheral IV 06/27/23 Right Antecubital <1 day                Lab Results:   Results Reviewed     Procedure Component Value Units Date/Time    Basic metabolic panel [[id number]]  (Abnormal) Collected: 06/27/23 2046    Lab Status: Final result Specimen: Blood from Arm, Right Updated: 06/27/23 2108     Sodium 137 mmol/L      Potassium 5.0 mmol/L      Chloride 110 mmol/L      CO2 19 mmol/L      ANION GAP 8 mmol/L      BUN 52 mg/dL      Creatinine 1.48 mg/dL      Glucose 67 mg/dL      Calcium 9.2 mg/dL      eGFR 41 ml/min/1.73sq m     Narrative:      [facility] guidelines for Chronic Kidney Disease (CKD):   •  Stage 1 with normal or high GFR (GFR > 90 mL/min/1.73 square meters)  •  Stage 2 Mild CKD (GFR = 60-89 mL/min/1.73 square meters)  •  Stage 3A Moderate CKD (GFR = 45-59 mL/min/1.73 square meters)  •  Stage 3B Moderate CKD (GFR = 30-44 mL/min/1.73 square meters)  •  Stage 4 Severe CKD (GFR = 15-29 mL/min/1.73 square meters)  •  Stage 5 End Stage CKD (GFR <15 mL/min/1.73 square meters)  Note: GFR calculation is accurate only with a steady state creatinine    Protime-INR [[id number]]  (Normal) Collected: 06/27/23 2046    Lab Status: Final result Specimen: Blood from Arm, Right Updated: 06/27/23 2104     Protime 14.1 seconds      INR 1.09    APTT [[id number]]  (Normal) Collected: 06/27/23 2046    Lab Status: Final result Specimen: Blood from Arm, Right Updated: 06/27/23 2104     PTT 25 seconds     RBC Morphology Reflex Test [[id number]] Collected: 06/27/23 2003    Lab Status: Final result Specimen: Blood from Arm, Right Updated: 06/27/23 2101    CBC and differential [[id number]]  (Abnormal) Collected: 06/27/23 2003    Lab Status: Final result Specimen: Blood from Arm, Right Updated: 06/27/23 2033     WBC 14.11 Thousand/uL      RBC 3.93 Million/uL      Hemoglobin 12.6 g/dL      Hematocrit 40.0 %       fL      MCH 32.1 pg      MCHC 31.5 g/dL      RDW 16.4 %      MPV 9.4 fL      Platelets 250 Thousands/uL     Narrative: This is an appended report. These results have been appended to a previously verified report.     Manual Differential(PHLEBS Do Not Order) [[id number]]  (Abnormal) Collected: 06/27/23 2003    Lab Status: Final result Specimen: Blood from Arm, Right Updated: 06/27/23 2033     Segmented % 65 %      Bands % 3 %      Lymphocytes % 15 %      Monocytes % 12 %      Eosinophils, % 1 %      Basophils % 0 %      Metamyelocytes% 2 %      Myelocytes % 1 %      Atypical Lymphocytes % 1 %      Absolute Neutrophils 9.59 Thousand/uL      Lymphocytes Absolute 2.12 Thousand/uL      Monocytes Absolute 1.69 Thousand/uL      Eosinophils Absolute 0.14 Thousand/uL      Basophils Absolute 0.00 Thousand/uL      Total Counted --     RBC Morphology Normal     Platelet Estimate Adequate    UA w Reflex to Microscopic w Reflex to Culture [[id number]]     Lab Status: No result Specimen: Urine                  Imaging Studies:   Direct to CT: No  TRAUMA - CT head wo contrast   Final Result by LeWa Tek List, DO (06/27 2044)   No acute intracranial abnormality. Workstation performed: [id number]         TRAUMA - CT spine cervical wo contrast   Final Result by Shanelleadam Fishman, DO (06/27 2051)   No cervical spine fracture or traumatic malalignment. Workstation performed: [id number]         XR humerus LEFT   Final Result by Shanelleadam Fishman, DO (06/27 2103)   No acute osseous abnormality. Workstation performed: [id number]         XR Trauma chest portable   Final Result by Shanelle List, DO (06/27 2057)   No acute displaced fracture      Persistent chronic opacity in the right upper lung, appears diminished compared to May. Consider further evaluation with chest CT with no contrast if clinically warranted given patient's age. Alternatively, follow-up with a chest radiograph could be    obtained in 3 to 6 months as recommended previously. Workstation performed: [id number]         XR Trauma pelvis ap only 1 or 2 vw   Final Result by Shanelle Sravanthi, DO (06/27 2100)   No acute osseous abnormality. Suggest dedicated x-rays of the right hip if clinically warranted.       Because this was a trauma evaluation: The study was marked in EPIC for immediate notification. CT head and cervical spine already dictated. Workstation performed: [id number]         CT chest without contrast    (Results Pending)         Procedures  POC FAST US    Date/Time: 6/27/2023 9:30 PM    Performed by: Tessa Boogie PA-C  Authorized by: Tessa Boogie PA-C    Patient location:  ED  Other Assisting Provider: No    Procedure details:     Exam Type:  Diagnostic    Indications: blunt abdominal trauma      Assess for:  Intra-abdominal fluid and pericardial effusion    Technique: FAST      Views obtained:  RUQ - Tearn's Pouch, LUQ - Splenorenal space, Suprapubic - Pouch of Boni and Heart - Pericardial sac    Image quality: diagnostic      Image availability:  Not saved  FAST Findings:     RUQ (Hepatorenal) free fluid: absent      LUQ (Splenorenal) free fluid: absent      Suprapubic free fluid: absent      Cardiac wall motion: identified      Pericardial effusion: absent    Interpretation:     Impressions: negative               ED Course  ED Course as of 06/27/23 2219   Tue Jun 27, 2023 2110 CXR: No acute displaced fracture     Persistent chronic opacity in the right upper lung, appears diminished compared to May. Consider further evaluation with chest CT with no contrast if clinically warranted given patient's age. Alternatively, follow-up with a chest radiograph could be   obtained in 3 to 6 months as recommended previously. 2111 Chest CT ordered. Still waiting on UA.    2156 Pt signed out to Dr Hira Malloy pending Chest CT and UA           Medical Decision Making  60-year-old male presenting to the emergency department for evaluation after a fall at nursing facility. Only complaint at this time is left arm pain. Trauma eval was called on arrival due to patient's head injury and on aspirin. Differential: ICH, concussion, skull fracture.  Right arm pain concerning for fracture, dislocation, contusion however FROM. Neurovascular compromise considered but not evident on exam. Considered traumatic intra-abdominal pathology however patient has no abdominal tenderness and no complaints of pain with negative FAST. Patient is AAxOx4 and appears to be a reliable historian. Trauma workup initiated. Leukocytosis could be indicative of infection vs stress response. Will check UA for asymptomatic UTI. Renal function stable. No acute traumatic process identified on imaging. Abnormal CXR concerning for neoplasm. Will proceed with Chest CT to further evaluate. Patient signed out to Dr. Charmayne London pending read of chest CT and UA. Fall, initial encounter: acute illness or injury  Left arm pain: acute illness or injury  Amount and/or Complexity of Data Reviewed  Labs: ordered. Details: minimal leukocytosis could be in the setting of stress. No significant concerns on exam concerning for infection. No other SIRS criteria. WIll check UA. Renal function at baseline. Coags ordered in the setting of trauma  Radiology: ordered. Details: No acute traumatic process identified on imaging. CT chest ordered to eval for malignancy in right upper lung. Risk  OTC drugs. Disposition  [facility] Transfer: No  Final diagnoses:   Fall, initial encounter   Left arm pain     Time reflects when diagnosis was documented in both MDM as applicable and the Disposition within this note     Time User Action Codes Description Comment    6/27/2023 10:08 PM Janette Villa Add [W19. UNUX] Fall, initial encounter     6/27/2023 10:08 PM Janette Villa Add [M79.602] Left arm pain       ED Disposition     None      Follow-up Information    None       Patient's Medications   Discharge Prescriptions    No medications on file     No discharge procedures on file.     PDMP Review     None          ED Provider  Electronically Signed by         Ignacio Hough PA-C  06/27/23 5977

## 2023-06-28 NOTE — DISCHARGE INSTRUCTIONS
RETURN IF WORSE IN ANY WAY:   WORSENING SYMPTOMS IN ANY WAY  CHEST PAIN,   SHORTNESS OF BREATH,   FEVER OR FLU LIKE SYMPTOMS,   OR NEW AND CONCERNING SYMPTOMS SIGNS OR SYMPTOMS      PLEASE CALL YOUR PRIMARY DOCTOR IN THE MORNING TO SET UP FOLLOW UP   PLEASE REVIEW THE WORK UP RESULTS WITH YOUR DOCTOR  YOU MUST FOLLOW UP ABOUT THE PULMONARY NODULES SEEN ON CT

## 2023-07-10 ENCOUNTER — APPOINTMENT (EMERGENCY)
Dept: CT IMAGING | Facility: HOSPITAL | Age: 88
DRG: 871 | End: 2023-07-10
Payer: COMMERCIAL

## 2023-07-10 ENCOUNTER — HOSPITAL ENCOUNTER (INPATIENT)
Facility: HOSPITAL | Age: 88
LOS: 4 days | Discharge: HOME HEALTH | DRG: 871 | End: 2023-07-14
Attending: EMERGENCY MEDICINE | Admitting: INTERNAL MEDICINE
Payer: COMMERCIAL

## 2023-07-10 DIAGNOSIS — E87.20 LACTIC ACIDOSIS: ICD-10-CM

## 2023-07-10 DIAGNOSIS — R10.9 ABDOMINAL PAIN: ICD-10-CM

## 2023-07-10 DIAGNOSIS — J18.9 PNEUMONIA OF BOTH LOWER LOBES DUE TO INFECTIOUS ORGANISM: ICD-10-CM

## 2023-07-10 DIAGNOSIS — K52.9 ENTERITIS: ICD-10-CM

## 2023-07-10 DIAGNOSIS — A41.9 SEPSIS (HCC): Primary | ICD-10-CM

## 2023-07-10 PROBLEM — R91.1 NODULE OF RIGHT LUNG: Status: ACTIVE | Noted: 2023-07-10

## 2023-07-10 LAB
ALBUMIN SERPL BCP-MCNC: 4 G/DL (ref 3.5–5)
ALP SERPL-CCNC: 65 U/L (ref 34–104)
ALT SERPL W P-5'-P-CCNC: 21 U/L (ref 7–52)
ANION GAP SERPL CALCULATED.3IONS-SCNC: 12 MMOL/L
APTT PPP: 26 SECONDS (ref 23–37)
AST SERPL W P-5'-P-CCNC: 25 U/L (ref 13–39)
ATRIAL RATE: 104 BPM
BASOPHILS # BLD MANUAL: 0.27 THOUSAND/UL (ref 0–0.1)
BASOPHILS NFR MAR MANUAL: 1 % (ref 0–1)
BILIRUB SERPL-MCNC: 0.42 MG/DL (ref 0.2–1)
BUN SERPL-MCNC: 36 MG/DL (ref 5–25)
CALCIUM SERPL-MCNC: 10.1 MG/DL (ref 8.4–10.2)
CARDIAC TROPONIN I PNL SERPL HS: 12 NG/L
CHLORIDE SERPL-SCNC: 102 MMOL/L (ref 96–108)
CO2 SERPL-SCNC: 17 MMOL/L (ref 21–32)
CREAT SERPL-MCNC: 1.35 MG/DL (ref 0.6–1.3)
EOSINOPHIL # BLD MANUAL: 0 THOUSAND/UL (ref 0–0.4)
EOSINOPHIL NFR BLD MANUAL: 0 % (ref 0–6)
ERYTHROCYTE [DISTWIDTH] IN BLOOD BY AUTOMATED COUNT: 15.3 % (ref 11.6–15.1)
GFR SERPL CREATININE-BSD FRML MDRD: 46 ML/MIN/1.73SQ M
GLUCOSE SERPL-MCNC: 107 MG/DL (ref 65–140)
HCT VFR BLD AUTO: 47.4 % (ref 36.5–49.3)
HGB BLD-MCNC: 14.9 G/DL (ref 12–17)
INR PPP: 1.07 (ref 0.84–1.19)
LACTATE SERPL-SCNC: 4 MMOL/L (ref 0.5–2)
LIPASE SERPL-CCNC: 29 U/L (ref 11–82)
LYMPHOCYTES # BLD AUTO: 1.91 THOUSAND/UL (ref 0.6–4.47)
LYMPHOCYTES # BLD AUTO: 7 % (ref 14–44)
MCH RBC QN AUTO: 31.8 PG (ref 26.8–34.3)
MCHC RBC AUTO-ENTMCNC: 31.4 G/DL (ref 31.4–37.4)
MCV RBC AUTO: 101 FL (ref 82–98)
METAMYELOCYTES NFR BLD MANUAL: 1 % (ref 0–1)
MONOCYTES # BLD AUTO: 1.36 THOUSAND/UL (ref 0–1.22)
MONOCYTES NFR BLD: 5 % (ref 4–12)
NEUTROPHILS # BLD MANUAL: 23.41 THOUSAND/UL (ref 1.85–7.62)
NEUTS BAND NFR BLD MANUAL: 2 % (ref 0–8)
NEUTS SEG NFR BLD AUTO: 84 % (ref 43–75)
P AXIS: 72 DEGREES
PLATELET # BLD AUTO: 334 THOUSANDS/UL (ref 149–390)
PLATELET BLD QL SMEAR: ADEQUATE
PMV BLD AUTO: 9.3 FL (ref 8.9–12.7)
POTASSIUM SERPL-SCNC: 5.4 MMOL/L (ref 3.5–5.3)
PR INTERVAL: 208 MS
PROCALCITONIN SERPL-MCNC: 0.1 NG/ML
PROT SERPL-MCNC: 8.8 G/DL (ref 6.4–8.4)
PROTHROMBIN TIME: 13.9 SECONDS (ref 11.6–14.5)
QRS AXIS: -45 DEGREES
QRSD INTERVAL: 72 MS
QT INTERVAL: 328 MS
QTC INTERVAL: 431 MS
RBC # BLD AUTO: 4.68 MILLION/UL (ref 3.88–5.62)
RBC MORPH BLD: NORMAL
SODIUM SERPL-SCNC: 131 MMOL/L (ref 135–147)
T WAVE AXIS: 82 DEGREES
VENTRICULAR RATE: 104 BPM
WBC # BLD AUTO: 27.22 THOUSAND/UL (ref 4.31–10.16)

## 2023-07-10 PROCEDURE — 84145 PROCALCITONIN (PCT): CPT | Performed by: EMERGENCY MEDICINE

## 2023-07-10 PROCEDURE — 36415 COLL VENOUS BLD VENIPUNCTURE: CPT | Performed by: EMERGENCY MEDICINE

## 2023-07-10 PROCEDURE — 84484 ASSAY OF TROPONIN QUANT: CPT | Performed by: EMERGENCY MEDICINE

## 2023-07-10 PROCEDURE — 74177 CT ABD & PELVIS W/CONTRAST: CPT

## 2023-07-10 PROCEDURE — 85610 PROTHROMBIN TIME: CPT | Performed by: EMERGENCY MEDICINE

## 2023-07-10 PROCEDURE — 93005 ELECTROCARDIOGRAM TRACING: CPT

## 2023-07-10 PROCEDURE — 99285 EMERGENCY DEPT VISIT HI MDM: CPT

## 2023-07-10 PROCEDURE — 70450 CT HEAD/BRAIN W/O DYE: CPT

## 2023-07-10 PROCEDURE — 93010 ELECTROCARDIOGRAM REPORT: CPT | Performed by: INTERNAL MEDICINE

## 2023-07-10 PROCEDURE — 99285 EMERGENCY DEPT VISIT HI MDM: CPT | Performed by: EMERGENCY MEDICINE

## 2023-07-10 PROCEDURE — 80053 COMPREHEN METABOLIC PANEL: CPT | Performed by: EMERGENCY MEDICINE

## 2023-07-10 PROCEDURE — 96365 THER/PROPH/DIAG IV INF INIT: CPT

## 2023-07-10 PROCEDURE — 87040 BLOOD CULTURE FOR BACTERIA: CPT | Performed by: EMERGENCY MEDICINE

## 2023-07-10 PROCEDURE — 85027 COMPLETE CBC AUTOMATED: CPT | Performed by: EMERGENCY MEDICINE

## 2023-07-10 PROCEDURE — 85007 BL SMEAR W/DIFF WBC COUNT: CPT | Performed by: EMERGENCY MEDICINE

## 2023-07-10 PROCEDURE — 83690 ASSAY OF LIPASE: CPT | Performed by: EMERGENCY MEDICINE

## 2023-07-10 PROCEDURE — 83605 ASSAY OF LACTIC ACID: CPT | Performed by: EMERGENCY MEDICINE

## 2023-07-10 PROCEDURE — 85730 THROMBOPLASTIN TIME PARTIAL: CPT | Performed by: EMERGENCY MEDICINE

## 2023-07-10 PROCEDURE — G1004 CDSM NDSC: HCPCS

## 2023-07-10 RX ORDER — SODIUM CHLORIDE, SODIUM GLUCONATE, SODIUM ACETATE, POTASSIUM CHLORIDE, MAGNESIUM CHLORIDE, SODIUM PHOSPHATE, DIBASIC, AND POTASSIUM PHOSPHATE .53; .5; .37; .037; .03; .012; .00082 G/100ML; G/100ML; G/100ML; G/100ML; G/100ML; G/100ML; G/100ML
1000 INJECTION, SOLUTION INTRAVENOUS ONCE
Status: COMPLETED | OUTPATIENT
Start: 2023-07-11 | End: 2023-07-11

## 2023-07-10 RX ADMIN — PIPERACILLIN AND TAZOBACTAM 3.38 G: 3; .375 INJECTION, POWDER, LYOPHILIZED, FOR SOLUTION INTRAVENOUS at 22:35

## 2023-07-10 RX ADMIN — IOHEXOL 100 ML: 350 INJECTION, SOLUTION INTRAVENOUS at 22:11

## 2023-07-10 NOTE — Clinical Note
Case was discussed with Scott Enriquez and the patient's admission status was agreed to be Admission Status: inpatient status to the service of Dr. Madsen

## 2023-07-10 NOTE — LETTER
July 13, 2023       No Recipients    Patient: Alethea Fothergill   YOB: 1935   Date of Visit: 7/10/2023       Dear Dr. Thierry Sood Recipients: Thank you for referring Alethea Fothergill to me for evaluation. Below are my notes for this consultation. If you have questions, please do not hesitate to call me. I look forward to following your patient along with you. Sincerely,        No name on file        CC:   No Recipients    Annita Card, [address] [place] [address]  7/12/2023  2:26 PM  Attested  [address]  Progress Note  Name: Jonathon Neville  MRN: [id number]  Unit/Bed#: -01 I Date of Admission: 7/10/2023   Date of Service: 7/12/2023 I Hospital Day: 2    Assessment/Plan   * Sepsis [facility])  Assessment & Plan  Patient sent from dementia unit secondary to vomiting and abdominal pain  Met sepsis criteria on arrival with leukocytosis, tachycardia, in setting of possible enteritis on CT scan  7/10 WBC 27.22, continues to trend downward   7/10 lactic acid 4.0, received IV fluid resuscitation, repeat 2.7  7/10 blood cultures x2 negative to date  7/10 CT abdomen pelvis: Suggested enteritis. Also noted patchy bibasilar groundglass opacities, suggest correlate for atypical/viral infection or aspiration   7/11 urine for strep pneumonia and Legionella negative  7/11 MRSA swab pending  GI consulted, appreciate recommendations. Felt likely viral gastroenteritis. Pulmonology consulted, appreciate recommendations  Speech therapy consulted, appreciate recommendations  Patient continues on IV Zosyn, will continue today. Plan to monitor off of antibiotics in a.m. pending procalcitonin results and patient presentation  CBC, procalcitonin in a.m.       Pneumonia due to infectious organism  Assessment & Plan  Present on admission   7/10 CT abdomen pelvis: Noted patchy bibasilar groundglass opacities, suggested correlating for atypical/viral infection or aspiration  Initiated on Zosyn in the ED, continued on floor  Pulmonology consulted, appreciate recommendations  Speech therapy consulted, appreciate recommendations  Continue Xopenex treatments  Tessalon Perles as needed for cough  We will trend procalcitonin, plan to discontinue and monitor off of antibiotics tomorrow if procalcitonin negative  Encourage out of bed to chair  Incentive spirometry  CBC, procalcitonin in a.m. Nodule of right lung  Assessment & Plan  CT: Nonspecific 2.2 x 1.4 cm spiculated nodule in the subpleural region of the right upper lobe noted with surrounding hazy parenchymal opacity. Recommend follow-up CT chest at 3 months, PET/CT, or tissue sampling per current Fleischner Society guidelines. Incidental finding on previous workup from fall on 6/27. F/u w/ PCP on discharge    Hyponatremia  Assessment & Plan  Present on admission as evidenced by sodium of 131  Likely hypovolemic hypotonic hyponatremia in the setting of sepsis  Patient received IV fluid hydration, hyponatremia resolved  BMP a.m. VTE Pharmacologic Prophylaxis:   Pharmacologic: Heparin  Mechanical VTE Prophylaxis in Place: Yes    Patient Centered Rounds: I have performed bedside rounds with nursing staff today. Discussions with Specialists or Other Care Team Provider: pulmonology, GI, nursing, case management. Education and Discussions with Family / Patient: Treatment plan discussed with patient who understands what has been explained and is agreeable to the plan as stated. All questions answered to satisfaction. Will update patients son to plan. Time Spent for Care: 41 minutes'. More than 50% of total time spent on counseling and coordination of care as described above. Current Length of Stay: 2 day(s)    Current Patient Status: Inpatient   Certification Statement: The patient will continue to require additional inpatient hospital stay due to need for IV antibiotics.  trend procal, repeat labs in am    Discharge Plan: Patient is from the [facility] living [facility]. Plans for patient to return there once stable for discharge. Tentative discharge next 24-48 hours. Code Status: Level 3 - DNAR and DNI      Subjective:   Pleasant, denies pain or discomfort. States he is feeling better today. Objective:     Vitals:   Temp (24hrs), Av °F (36.7 °C), Min:97.7 °F (36.5 °C), Max:98.4 °F (36.9 °C)    Temp:  [97.7 °F (36.5 °C)-98.4 °F (36.9 °C)] 97.7 °F (36.5 °C)  HR:  [61-81] 81  Resp:  [16-18] 18  BP: (107-109)/(48-54) 108/48  SpO2:  [95 %-98 %] 97 %  Body mass index is 17.34 kg/m². Input and Output Summary (last 24 hours): Intake/Output Summary (Last 24 hours) at 2023 1416  Last data filed at 2023 1224  Gross per 24 hour   Intake 466 ml   Output 1050 ml   Net -584 ml       Physical Exam:     Physical Exam  Vitals and nursing note reviewed. Constitutional:       General: He is not in acute distress. Appearance: Normal appearance. He is not ill-appearing. HENT:      Head: Normocephalic and atraumatic. Nose: Nose normal.      Mouth/Throat:      Mouth: Mucous membranes are dry. Cardiovascular:      Rate and Rhythm: Normal rate and regular rhythm. Pulses: Normal pulses. Heart sounds: Normal heart sounds. Pulmonary:      Effort: Pulmonary effort is normal. No respiratory distress. Breath sounds: Normal breath sounds. No wheezing or rales. Comments: Nonlabored respirations on room air, decreased breath sounds bilateral bases, no cough or shortness of breath  Abdominal:      General: Bowel sounds are normal. There is no distension. Palpations: Abdomen is soft. Tenderness: There is no abdominal tenderness. There is no guarding. Musculoskeletal:         General: Normal range of motion. Skin:     General: Skin is warm and dry. Capillary Refill: Capillary refill takes less than 2 seconds. Neurological:      General: No focal deficit present. Mental Status: He is alert. Mental status is at baseline. Comments: Gait not assessed at this time   Psychiatric:         Mood and Affect: Mood normal.         Behavior: Behavior normal.         Additional Data:     Labs:    Results from last 7 days   Lab Units 07/12/23  0444   WBC Thousand/uL 16.16*   HEMOGLOBIN g/dL 11.1*   HEMATOCRIT % 34.5*   PLATELETS Thousands/uL 234   NEUTROS PCT % 84*   LYMPHS PCT % 8*   MONOS PCT % 7   EOS PCT % 0     Results from last 7 days   Lab Units 07/12/23  0444 07/11/23  0636 07/10/23  2108   POTASSIUM mmol/L 4.2   < > 5.4*   CHLORIDE mmol/L 108   < > 102   CO2 mmol/L 21   < > 17*   BUN mg/dL 28*   < > 36*   CREATININE mg/dL 1.20   < > 1.35*   CALCIUM mg/dL 8.5   < > 10.1   ALK PHOS U/L  --   --  65   ALT U/L  --   --  21   AST U/L  --   --  25    < > = values in this interval not displayed. Results from last 7 days   Lab Units 07/10/23  2149   INR  1.07       * I Have Reviewed All Lab Data Listed Above. * Additional Pertinent Lab Tests Reviewed: [address] Admission Reviewed    Imaging:    Imaging Reports Reviewed Today Include: CT abdomen pelvis  Imaging Personally Reviewed by Myself Includes: CT abdomen pelvis    Recent Cultures (last 7 days):     Results from last 7 days   Lab Units 07/11/23  0114 07/10/23  2159 07/10/23  2149   BLOOD CULTURE   --  No Growth at 24 hrs. No Growth at 24 hrs.    LEGIONELLA URINARY ANTIGEN  Negative  --   --        Last 24 Hours Medication List:   Current Facility-Administered Medications   Medication Dose Route Frequency Provider Last Rate    acetaminophen  650 mg Oral Q4H PRN LINNETTE De La Cruz-C      aspirin  81 mg Oral Daily LINNETTE De La Cruz-YVETTE      benzonatate  100 mg Oral TID PRN AMANDA Hines      buPROPion  150 mg Oral Daily LINNETTE De La Cruz-YVETTE      Diclofenac Sodium  2 g Topical 4x Daily LINNETTE De La Cruz-YVETTE      heparin (porcine)  5,000 Units Subcutaneous Q12H [address] [facility], [place]      levalbuterol  1.25 mg Nebulization TID Glenna Villafana MD      LORazepam  0.5 mg Oral Q6H PRN Jf Berrios PA-C      ondansetron  4 mg Intravenous Q6H PRN Jf Berrios PA-C      pantoprazole  40 mg Oral Daily [address] [facility], PA-C      piperacillin-tazobactam  2.25 g Intravenous Q6H [address] [facility], PAEveretteC 0 g (07/12/23 0020)    QUEtiapine  25 mg Oral BID Jf Berrios PA-C          Today, Patient Was Seen By: AMANDA Huff    ** Please Note: Dictation voice to text software may have been used in the creation of this document. **        Attestation signed by Omer Mao MD at 7/12/2023  2:27 PM:     I was the supervising/collaborating physician on [address] [place] [address]. I acknowledge the AP's documentation and services provided. I was available by phone, if needed, for consultation. Omer Mao MD 07/12/23    Sienna Yanez DO  7/11/2023  8:57 AM  Signed  * Sepsis [facility])  Assessment & Plan  Patient sent from dementia unit secondary to vomiting and abdominal pain - possible enteritis  Sepsis noted by leukocytosis and tacycardia  CT: possible enteritis  NPO  IV abx  GI consult  supportive care      Pneumonia due to infectious organism  Assessment & Plan  Pneumonia pathway  Speech consult  Pulmonary consult  CT: Patchy bibasilar groundglass opacities, nonspecific. Correlate for atypical/viral infection or aspiration. Hyponatremia  Assessment & Plan  Present on admission as evidenced by sodium of 131  Likely hypovolemic hypotonic hyponatremia in the setting of sepsis  IV fluid hydration  Trend BMP  Avoid rapid overcorrection    Nodule of right lung  Assessment & Plan  CT: Nonspecific 2.2 x 1.4 cm spiculated nodule in the subpleural region of the right upper lobe noted with surrounding hazy parenchymal opacity.  Recommend follow-up CT chest at 3 months, PET/CT, or tissue sampling per current Vidhi Society guidelines. Incidental finding on previous workup from fall on 6/27.  F/u w/ PCP

## 2023-07-10 NOTE — LETTER
July 14, 2023     [facility]    Patient: Cherie Cook   YOB: 1935   Date of Visit: 7/10/2023       Dear Dr. Nils Watson: Thank you for referring Cherie Cook to me for evaluation. Below are my notes for this consultation. If you have questions, please do not hesitate to call me. I look forward to following your patient along with you. Sincerely,        No name on file        CC: No Recipients    Annita Card, [address] [place] [address]  7/12/2023  2:26 PM  Attested  [address]  Progress Note  Name: Rodrigue Waller  MRN: [id number]  Unit/Bed#: -01 I Date of Admission: 7/10/2023   Date of Service: 7/12/2023 I Hospital Day: 2    Assessment/Plan   * Sepsis [facility])  Assessment & Plan  Patient sent from dementia unit secondary to vomiting and abdominal pain  Met sepsis criteria on arrival with leukocytosis, tachycardia, in setting of possible enteritis on CT scan  7/10 WBC 27.22, continues to trend downward   7/10 lactic acid 4.0, received IV fluid resuscitation, repeat 2.7  7/10 blood cultures x2 negative to date  7/10 CT abdomen pelvis: Suggested enteritis. Also noted patchy bibasilar groundglass opacities, suggest correlate for atypical/viral infection or aspiration   7/11 urine for strep pneumonia and Legionella negative  7/11 MRSA swab pending  GI consulted, appreciate recommendations. Felt likely viral gastroenteritis. Pulmonology consulted, appreciate recommendations  Speech therapy consulted, appreciate recommendations  Patient continues on IV Zosyn, will continue today. Plan to monitor off of antibiotics in a.m. pending procalcitonin results and patient presentation  CBC, procalcitonin in a.m.       Pneumonia due to infectious organism  Assessment & Plan  Present on admission   7/10 CT abdomen pelvis: Noted patchy bibasilar groundglass opacities, suggested correlating for atypical/viral infection or aspiration  Initiated on Zosyn in the ED, continued on floor  Pulmonology consulted, appreciate recommendations  Speech therapy consulted, appreciate recommendations  Continue Xopenex treatments  Tessalon Perles as needed for cough  We will trend procalcitonin, plan to discontinue and monitor off of antibiotics tomorrow if procalcitonin negative  Encourage out of bed to chair  Incentive spirometry  CBC, procalcitonin in a.m. Nodule of right lung  Assessment & Plan  CT: Nonspecific 2.2 x 1.4 cm spiculated nodule in the subpleural region of the right upper lobe noted with surrounding hazy parenchymal opacity. Recommend follow-up CT chest at 3 months, PET/CT, or tissue sampling per current Fleischner Society guidelines. Incidental finding on previous workup from fall on 6/27. F/u w/ PCP on discharge    Hyponatremia  Assessment & Plan  Present on admission as evidenced by sodium of 131  Likely hypovolemic hypotonic hyponatremia in the setting of sepsis  Patient received IV fluid hydration, hyponatremia resolved  BMP a.m. VTE Pharmacologic Prophylaxis:   Pharmacologic: Heparin  Mechanical VTE Prophylaxis in Place: Yes    Patient Centered Rounds: I have performed bedside rounds with nursing staff today. Discussions with Specialists or Other Care Team Provider: pulmonology, GI, nursing, case management. Education and Discussions with Family / Patient: Treatment plan discussed with patient who understands what has been explained and is agreeable to the plan as stated. All questions answered to satisfaction. Will update patients son to plan. Time Spent for Care: 41 minutes'. More than 50% of total time spent on counseling and coordination of care as described above. Current Length of Stay: 2 day(s)    Current Patient Status: Inpatient   Certification Statement: The patient will continue to require additional inpatient hospital stay due to need for IV antibiotics.  trend procal, repeat labs in am    Discharge Plan: [facility] is [facility] the [facility] facility. Plans for patient to return there once stable for discharge. Tentative discharge next 24-48 hours. Code Status: Level 3 - DNAR and DNI      Subjective:   Pleasant, denies pain or discomfort. States he is feeling better today. Objective:     Vitals:   Temp (24hrs), Av °F (36.7 °C), Min:97.7 °F (36.5 °C), Max:98.4 °F (36.9 °C)    Temp:  [97.7 °F (36.5 °C)-98.4 °F (36.9 °C)] 97.7 °F (36.5 °C)  HR:  [61-81] 81  Resp:  [16-18] 18  BP: (107-109)/(48-54) 108/48  SpO2:  [95 %-98 %] 97 %  Body mass index is 17.34 kg/m². Input and Output Summary (last 24 hours): Intake/Output Summary (Last 24 hours) at 2023 1416  Last data filed at 2023 1224  Gross per 24 hour   Intake 466 ml   Output 1050 ml   Net -584 ml       Physical Exam:     Physical Exam  Vitals and nursing note reviewed. Constitutional:       General: He is not in acute distress. Appearance: Normal appearance. He is not ill-appearing. HENT:      Head: Normocephalic and atraumatic. Nose: Nose normal.      Mouth/Throat:      Mouth: Mucous membranes are dry. Cardiovascular:      Rate and Rhythm: Normal rate and regular rhythm. Pulses: Normal pulses. Heart sounds: Normal heart sounds. Pulmonary:      Effort: Pulmonary effort is normal. No respiratory distress. Breath sounds: Normal breath sounds. No wheezing or rales. Comments: Nonlabored respirations on room air, decreased breath sounds bilateral bases, no cough or shortness of breath  Abdominal:      General: Bowel sounds are normal. There is no distension. Palpations: Abdomen is soft. Tenderness: There is no abdominal tenderness. There is no guarding. Musculoskeletal:         General: Normal range of motion. Skin:     General: Skin is warm and dry. Capillary Refill: Capillary refill takes less than 2 seconds. Neurological:      General: No focal deficit present. Mental Status: He is alert.  Mental status is at baseline. Comments: Gait not assessed at this time   Psychiatric:         Mood and Affect: Mood normal.         Behavior: Behavior normal.         Additional Data:     Labs:    Results from last 7 days   Lab Units 07/12/23  0444   WBC Thousand/uL 16.16*   HEMOGLOBIN g/dL 11.1*   HEMATOCRIT % 34.5*   PLATELETS Thousands/uL 234   NEUTROS PCT % 84*   LYMPHS PCT % 8*   MONOS PCT % 7   EOS PCT % 0     Results from last 7 days   Lab Units 07/12/23  0444 07/11/23  0636 07/10/23  2108   POTASSIUM mmol/L 4.2   < > 5.4*   CHLORIDE mmol/L 108   < > 102   CO2 mmol/L 21   < > 17*   BUN mg/dL 28*   < > 36*   CREATININE mg/dL 1.20   < > 1.35*   CALCIUM mg/dL 8.5   < > 10.1   ALK PHOS U/L  --   --  65   ALT U/L  --   --  21   AST U/L  --   --  25    < > = values in this interval not displayed. Results from last 7 days   Lab Units 07/10/23  2149   INR  1.07       * I Have Reviewed All Lab Data Listed Above. * Additional Pertinent Lab Tests Reviewed: [address] Admission Reviewed    Imaging:    Imaging Reports Reviewed Today Include: CT abdomen pelvis  Imaging Personally Reviewed by Myself Includes: CT abdomen pelvis    Recent Cultures (last 7 days):     Results from last 7 days   Lab Units 07/11/23  0114 07/10/23  2159 07/10/23  2149   BLOOD CULTURE   --  No Growth at 24 hrs. No Growth at 24 hrs.    LEGIONELLA URINARY ANTIGEN  Negative  --   --        Last 24 Hours Medication List:   Current Facility-Administered Medications   Medication Dose Route Frequency Provider Last Rate   • acetaminophen  650 mg Oral Q4H PRN Faustine Pride, PA-C     • aspirin  81 mg Oral Daily Faustine Pride, PA-C     • benzonatate  100 mg Oral TID PRN AMANDA Hines     • buPROPion  150 mg Oral Daily Faustine Pride, PA-C     • Diclofenac Sodium  2 g Topical 4x Daily Faustine Pride, PA-C     • heparin (porcine)  5,000 Units Subcutaneous Q12H [facility] Lydia Garcia • levalbuterol  1.25 mg Nebulization TID Ananda Licea MD     • LORazepam  0.5 mg Oral Q6H PRN Kamla Roberts PA-C     • ondansetron  4 mg Intravenous Q6H PRN Kamla Roberts PA-C     • pantoprazole  40 mg Oral Daily Kamla Roberts PA-C     • piperacillin-tazobactam  2.25 g Intravenous Q6H Kamla Roberts PA-C 0 g (07/12/23 0020)   • QUEtiapine  25 mg Oral BID Kamla Roberts PA-C          Today, Patient Was Seen By: AMANDA Hair    ** Please Note: Dictation voice to text software may have been used in the creation of this document. **        Attestation signed by Kian Witt MD at 7/12/2023  2:27 PM:     I was the supervising/collaborating physician on [address] [place] [address]. I acknowledge the AP's documentation and services provided. I was available by phone, if needed, for consultation. Kian Witt MD 07/12/23    Zoila Paulino DO  7/11/2023  8:57 AM  Signed  * Sepsis [facility])  Assessment & Plan  Patient sent from dementia unit secondary to vomiting and abdominal pain - possible enteritis  Sepsis noted by leukocytosis and tacycardia  CT: possible enteritis  NPO  IV abx  GI consult  supportive care      Pneumonia due to infectious organism  Assessment & Plan  Pneumonia pathway  Speech consult  Pulmonary consult  CT: Patchy bibasilar groundglass opacities, nonspecific. Correlate for atypical/viral infection or aspiration. Hyponatremia  Assessment & Plan  Present on admission as evidenced by sodium of 131  Likely hypovolemic hypotonic hyponatremia in the setting of sepsis  IV fluid hydration  Trend BMP  Avoid rapid overcorrection    Nodule of right lung  Assessment & Plan  CT: Nonspecific 2.2 x 1.4 cm spiculated nodule in the subpleural region of the right upper lobe noted with surrounding hazy parenchymal opacity.  Recommend follow-up CT chest at 3 months, PET/CT, or tissue sampling per current Vidhi Society guidelines. Incidental finding on previous workup from fall on 6/27.  F/u w/ PCP

## 2023-07-11 PROBLEM — K52.9 ENTERITIS: Status: ACTIVE | Noted: 2023-07-11

## 2023-07-11 PROBLEM — K52.9 ENTERITIS: Status: RESOLVED | Noted: 2023-07-11 | Resolved: 2023-07-11

## 2023-07-11 PROBLEM — E87.1 HYPONATREMIA: Status: ACTIVE | Noted: 2023-07-11

## 2023-07-11 LAB
2HR DELTA HS TROPONIN: 4 NG/L
ANION GAP SERPL CALCULATED.3IONS-SCNC: 11 MMOL/L
BUN SERPL-MCNC: 32 MG/DL (ref 5–25)
CALCIUM SERPL-MCNC: 8.1 MG/DL (ref 8.4–10.2)
CARDIAC TROPONIN I PNL SERPL HS: 16 NG/L
CHLORIDE SERPL-SCNC: 106 MMOL/L (ref 96–108)
CO2 SERPL-SCNC: 18 MMOL/L (ref 21–32)
CREAT SERPL-MCNC: 1.11 MG/DL (ref 0.6–1.3)
ERYTHROCYTE [DISTWIDTH] IN BLOOD BY AUTOMATED COUNT: 15.3 % (ref 11.6–15.1)
GFR SERPL CREATININE-BSD FRML MDRD: 59 ML/MIN/1.73SQ M
GLUCOSE SERPL-MCNC: 93 MG/DL (ref 65–140)
HCT VFR BLD AUTO: 34.3 % (ref 36.5–49.3)
HGB BLD-MCNC: 11.2 G/DL (ref 12–17)
L PNEUMO1 AG UR QL IA.RAPID: NEGATIVE
LACTATE SERPL-SCNC: 2.7 MMOL/L (ref 0.5–2)
MCH RBC QN AUTO: 32.2 PG (ref 26.8–34.3)
MCHC RBC AUTO-ENTMCNC: 32.7 G/DL (ref 31.4–37.4)
MCV RBC AUTO: 99 FL (ref 82–98)
PLATELET # BLD AUTO: 236 THOUSANDS/UL (ref 149–390)
PMV BLD AUTO: 9.5 FL (ref 8.9–12.7)
POTASSIUM SERPL-SCNC: 4.6 MMOL/L (ref 3.5–5.3)
PROCALCITONIN SERPL-MCNC: 0.25 NG/ML
RBC # BLD AUTO: 3.48 MILLION/UL (ref 3.88–5.62)
S PNEUM AG UR QL: NEGATIVE
SODIUM SERPL-SCNC: 135 MMOL/L (ref 135–147)
WBC # BLD AUTO: 22.68 THOUSAND/UL (ref 4.31–10.16)

## 2023-07-11 PROCEDURE — 87081 CULTURE SCREEN ONLY: CPT | Performed by: INTERNAL MEDICINE

## 2023-07-11 PROCEDURE — 94664 DEMO&/EVAL PT USE INHALER: CPT

## 2023-07-11 PROCEDURE — 85027 COMPLETE CBC AUTOMATED: CPT | Performed by: PHYSICIAN ASSISTANT

## 2023-07-11 PROCEDURE — 99223 1ST HOSP IP/OBS HIGH 75: CPT | Performed by: INTERNAL MEDICINE

## 2023-07-11 PROCEDURE — 94760 N-INVAS EAR/PLS OXIMETRY 1: CPT

## 2023-07-11 PROCEDURE — 36415 COLL VENOUS BLD VENIPUNCTURE: CPT | Performed by: EMERGENCY MEDICINE

## 2023-07-11 PROCEDURE — 84145 PROCALCITONIN (PCT): CPT | Performed by: PHYSICIAN ASSISTANT

## 2023-07-11 PROCEDURE — 99222 1ST HOSP IP/OBS MODERATE 55: CPT | Performed by: INTERNAL MEDICINE

## 2023-07-11 PROCEDURE — 87449 NOS EACH ORGANISM AG IA: CPT | Performed by: PHYSICIAN ASSISTANT

## 2023-07-11 PROCEDURE — 84484 ASSAY OF TROPONIN QUANT: CPT | Performed by: EMERGENCY MEDICINE

## 2023-07-11 PROCEDURE — 94640 AIRWAY INHALATION TREATMENT: CPT

## 2023-07-11 PROCEDURE — 80048 BASIC METABOLIC PNL TOTAL CA: CPT | Performed by: PHYSICIAN ASSISTANT

## 2023-07-11 PROCEDURE — 83605 ASSAY OF LACTIC ACID: CPT | Performed by: EMERGENCY MEDICINE

## 2023-07-11 RX ORDER — PANTOPRAZOLE SODIUM 40 MG/1
40 TABLET, DELAYED RELEASE ORAL DAILY
Status: DISCONTINUED | OUTPATIENT
Start: 2023-07-11 | End: 2023-07-14 | Stop reason: HOSPADM

## 2023-07-11 RX ORDER — LORAZEPAM 0.5 MG/1
0.5 TABLET ORAL EVERY 6 HOURS PRN
Status: DISCONTINUED | OUTPATIENT
Start: 2023-07-11 | End: 2023-07-14 | Stop reason: HOSPADM

## 2023-07-11 RX ORDER — BUPROPION HYDROCHLORIDE 150 MG/1
150 TABLET ORAL DAILY
Status: DISCONTINUED | OUTPATIENT
Start: 2023-07-11 | End: 2023-07-14 | Stop reason: HOSPADM

## 2023-07-11 RX ORDER — LEVALBUTEROL INHALATION SOLUTION 1.25 MG/3ML
1.25 SOLUTION RESPIRATORY (INHALATION)
Status: DISCONTINUED | OUTPATIENT
Start: 2023-07-11 | End: 2023-07-14 | Stop reason: HOSPADM

## 2023-07-11 RX ORDER — ONDANSETRON 2 MG/ML
4 INJECTION INTRAMUSCULAR; INTRAVENOUS EVERY 6 HOURS PRN
Status: DISCONTINUED | OUTPATIENT
Start: 2023-07-10 | End: 2023-07-14 | Stop reason: HOSPADM

## 2023-07-11 RX ORDER — ACETAMINOPHEN 325 MG/1
650 TABLET ORAL EVERY 4 HOURS PRN
Status: DISCONTINUED | OUTPATIENT
Start: 2023-07-11 | End: 2023-07-14 | Stop reason: HOSPADM

## 2023-07-11 RX ORDER — QUETIAPINE FUMARATE 25 MG/1
25 TABLET, FILM COATED ORAL 2 TIMES DAILY
Status: DISCONTINUED | OUTPATIENT
Start: 2023-07-11 | End: 2023-07-14 | Stop reason: HOSPADM

## 2023-07-11 RX ORDER — HEPARIN SODIUM 5000 [USP'U]/ML
5000 INJECTION, SOLUTION INTRAVENOUS; SUBCUTANEOUS EVERY 12 HOURS SCHEDULED
Status: DISCONTINUED | OUTPATIENT
Start: 2023-07-11 | End: 2023-07-14 | Stop reason: HOSPADM

## 2023-07-11 RX ORDER — MEGESTROL ACETATE 40 MG/ML
400 SUSPENSION ORAL 2 TIMES DAILY
Status: DISCONTINUED | OUTPATIENT
Start: 2023-07-11 | End: 2023-07-11

## 2023-07-11 RX ORDER — ENOXAPARIN SODIUM 100 MG/ML
40 INJECTION SUBCUTANEOUS DAILY
Status: DISCONTINUED | OUTPATIENT
Start: 2023-07-11 | End: 2023-07-11

## 2023-07-11 RX ADMIN — SODIUM CHLORIDE, SODIUM GLUCONATE, SODIUM ACETATE, POTASSIUM CHLORIDE, MAGNESIUM CHLORIDE, SODIUM PHOSPHATE, DIBASIC, AND POTASSIUM PHOSPHATE 1000 ML: .53; .5; .37; .037; .03; .012; .00082 INJECTION, SOLUTION INTRAVENOUS at 01:16

## 2023-07-11 RX ADMIN — BUPROPION HYDROCHLORIDE 150 MG: 150 TABLET, EXTENDED RELEASE ORAL at 08:41

## 2023-07-11 RX ADMIN — HEPARIN SODIUM 5000 UNITS: 5000 INJECTION INTRAVENOUS; SUBCUTANEOUS at 08:38

## 2023-07-11 RX ADMIN — PANTOPRAZOLE SODIUM 40 MG: 40 TABLET, DELAYED RELEASE ORAL at 08:42

## 2023-07-11 RX ADMIN — QUETIAPINE FUMARATE 25 MG: 25 TABLET ORAL at 17:25

## 2023-07-11 RX ADMIN — Medication 2.25 G: at 04:54

## 2023-07-11 RX ADMIN — QUETIAPINE FUMARATE 25 MG: 25 TABLET ORAL at 08:40

## 2023-07-11 RX ADMIN — SODIUM CHLORIDE, SODIUM GLUCONATE, SODIUM ACETATE, POTASSIUM CHLORIDE, MAGNESIUM CHLORIDE, SODIUM PHOSPHATE, DIBASIC, AND POTASSIUM PHOSPHATE 1000 ML: .53; .5; .37; .037; .03; .012; .00082 INJECTION, SOLUTION INTRAVENOUS at 00:00

## 2023-07-11 RX ADMIN — LEVALBUTEROL HYDROCHLORIDE 1.25 MG: 1.25 SOLUTION RESPIRATORY (INHALATION) at 19:43

## 2023-07-11 RX ADMIN — Medication 2.25 G: at 23:35

## 2023-07-11 RX ADMIN — Medication 2.25 G: at 17:25

## 2023-07-11 RX ADMIN — Medication 2.25 G: at 10:40

## 2023-07-11 RX ADMIN — ASPIRIN 81 MG: 81 TABLET, COATED ORAL at 08:41

## 2023-07-11 RX ADMIN — LORAZEPAM 0.5 MG: 0.5 TABLET ORAL at 01:19

## 2023-07-11 RX ADMIN — HEPARIN SODIUM 5000 UNITS: 5000 INJECTION INTRAVENOUS; SUBCUTANEOUS at 21:47

## 2023-07-11 NOTE — RESPIRATORY THERAPY NOTE
RT Protocol Note  Nils Olszewski 81 y.o. male MRN: [id number]  Unit/Bed#: ED 20 Encounter: [id number]    Assessment    Principal Problem:    Sepsis ([address])  Active Problems:    Nodule of right lung    Pneumonia due to infectious organism      Home Pulmonary Medications:    Home Devices/Therapy:  (none)    Past Medical History:   Diagnosis Date    Anemia     Anxiety     Dementia ([address])     Depression     Hyperlipidemia     Hypertension      Social History     Socioeconomic History    Marital status:      Spouse name: None    Number of children: None    Years of education: None    Highest education level: None   Occupational History    None   Tobacco Use    Smoking status: Former     Types: Cigarettes    Smokeless tobacco: Never   Substance and Sexual Activity    Alcohol use: Not Currently    Drug use: Never    Sexual activity: None   Other Topics Concern    None   Social History Narrative    None     Social Determinants of Health     Financial Resource Strain: Not on file   Food Insecurity: Not on file   Transportation Needs: Not on file   Physical Activity: Not on file   Stress: Not on file   Social Connections: Not on file   Intimate Partner Violence: Not on file   Housing Stability: Not on file       Subjective         Objective    Physical Exam:   Assessment Type: Assess only  General Appearance: Sleeping  Respiratory Pattern: Normal  Chest Assessment: Chest expansion symmetrical  Bilateral Breath Sounds: Clear, Diminished  O2 Device: ra    Vitals:  Blood pressure 126/58, pulse (P) 84, temperature (!) 97 °F (36.1 °C), resp. rate (P) 16, SpO2 93 %. Imaging and other studies: I have personally reviewed pertinent reports.       O2 Device: ra     Plan    Respiratory Plan: No distress/Pulmonary history        Resp Comments: pt asleep did not wake did assess, pt has pulm nodule but no other pulm hx no home med use, admitted for sepis, BS dim clear, no inidcation for resp nebs or mdi will discont protocol, pt has I/S at bedside

## 2023-07-11 NOTE — ASSESSMENT & PLAN NOTE
· Patient sent from dementia unit secondary to vomiting and abdominal pain  · Met sepsis criteria on arrival with leukocytosis, tachycardia, in setting of possible enteritis on CT scan  · 7/10 WBC 27.22, continues to trend downward   · 7/10 lactic acid 4.0, received IV fluid resuscitation, repeat 2.7  · 7/10 blood cultures x2 negative to date  · 7/10 CT abdomen pelvis: Suggested enteritis. Also noted patchy bibasilar groundglass opacities, suggest correlate for atypical/viral infection or aspiration   · 7/11 urine for strep pneumonia and Legionella negative  · 7/11 MRSA swab pending  · GI consulted, appreciate recommendations. Felt likely viral gastroenteritis. · Pulmonology consulted, appreciate recommendations  · Speech therapy consulted, appreciate recommendations  · Patient continues on IV Zosyn, will continue today. Plan to monitor off of antibiotics in a.m. pending procalcitonin results and patient presentation  · CBC, procalcitonin in a.m.

## 2023-07-11 NOTE — PLAN OF CARE
Problem: Prexisting or High Potential for Compromised Skin Integrity  Goal: Skin integrity is maintained or improved  Description: INTERVENTIONS:  - Identify patients at risk for skin breakdown  - Assess and monitor skin integrity  - Assess and monitor nutrition and hydration status  - Monitor labs   - Assess for incontinence   - Turn and reposition patient  - Assist with mobility/ambulation  - Relieve pressure over bony prominences  - Avoid friction and shearing  - Provide appropriate hygiene as needed including keeping skin clean and dry  - Evaluate need for skin moisturizer/barrier cream  - Collaborate with interdisciplinary team   - Patient/family teaching  - Consider wound care consult   Outcome: Progressing     Problem: Potential for Falls  Goal: Patient will remain free of falls  Description: INTERVENTIONS:  - Educate patient/family on patient safety including physical limitations  - Instruct patient to call for assistance with activity   - Consult OT/PT to assist with strengthening/mobility   - Keep Call bell within reach  - Keep bed low and locked with side rails adjusted as appropriate  - Keep care items and personal belongings within reach  - Initiate and maintain comfort rounds  - Make Fall Risk Sign visible to staff  - Offer Toileting every 1 Hours, in advance of need  - Initiate/Maintain bed alarm  - Obtain necessary fall risk management equipment: bed   - Apply yellow socks and bracelet for high fall risk patients  - Consider moving patient to room near nurses station  Outcome: Progressing     Problem: MOBILITY - ADULT  Goal: Maintain or return to baseline ADL function  Description: INTERVENTIONS:  -  Assess patient's ability to carry out ADLs; assess patient's baseline for ADL function and identify physical deficits which impact ability to perform ADLs (bathing, care of mouth/teeth, toileting, grooming, dressing, etc.)  - Assess/evaluate cause of self-care deficits   - Assess range of motion  - Assess patient's mobility; develop plan if impaired  - Assess patient's need for assistive devices and provide as appropriate  - Encourage maximum independence but intervene and supervise when necessary  - Involve family in performance of ADLs  - Assess for home care needs following discharge   - Consider OT consult to assist with ADL evaluation and planning for discharge  - Provide patient education as appropriate  Outcome: Progressing  Goal: Maintains/Returns to pre admission functional level  Description: INTERVENTIONS:  - Perform BMAT or MOVE assessment daily.   - Set and communicate daily mobility goal to care team and patient/family/caregiver. - Collaborate with rehabilitation services on mobility goals if consulted  - Perform Range of Motion 3 times a day. - Reposition patient every 2 hours.   - Dangle patient 3 times a day  - Stand patient 3 times a day  - Ambulate patient 3 times a day  - Out of bed to chair 3 times a day   - Out of bed for meals 3  Problem: INFECTION - ADULT  Goal: Absence or prevention of progression during hospitalization  Description: INTERVENTIONS:  - Assess and monitor for signs and symptoms of infection  - Monitor lab/diagnostic results  - Monitor all insertion sites, i.e. indwelling lines, tubes, and drains  - Monitor endotracheal if appropriate and nasal secretions for changes in amount and color  - [place] appropriate cooling/warming therapies per order  - Administer medications as ordered  - Instruct and encourage patient and family to use good hand hygiene technique  - Identify and instruct in appropriate isolation precautions for identified infection/condition  Outcome: Progressing  Goal: Absence of fever/infection during neutropenic period  Description: INTERVENTIONS:  - Monitor WBC    Outcome: Progressing    times a day  - Out of bed for toileting  - Record patient progress and toleration of activity level   Outcome: Progressing     Problem: Nutrition/Hydration-ADULT  Goal: Nutrient/Hydration intake appropriate for improving, restoring or maintaining nutritional needs  Description: Monitor and assess patient's nutrition/hydration status for malnutrition. Collaborate with interdisciplinary team and initiate plan and interventions as ordered. Monitor patient's weight and dietary intake as ordered or per policy. Utilize nutrition screening tool and intervene as necessary. Determine patient's food preferences and provide high-protein, high-caloric foods as appropriate.      INTERVENTIONS:  - Monitor oral intake, urinary output, labs, and treatment plans  - Assess nutrition and hydration status and recommend course of action  - Evaluate amount of meals eaten  - Assist patient with eating if necessary   - Allow adequate time for meals  - Recommend/ encourage appropriate diets, oral nutritional supplements, and vitamin/mineral supplements  - Order, calculate, and assess calorie counts as needed  - Recommend, monitor, and adjust tube feedings and TPN/PPN based on assessed needs  - Assess need for intravenous fluids  - Provide specific nutrition/hydration education as appropriate  - Include patient/family/caregiver in decisions related to nutrition  Outcome: Progressing

## 2023-07-11 NOTE — ASSESSMENT & PLAN NOTE
Patient sent from dementia unit secondary to vomiting and abdominal pain - possible enteritis  Sepsis noted by leukocytosis and tacycardia  CT: possible enteritis  · NPO  · IV abx  · GI consult  · supportive care

## 2023-07-11 NOTE — ED CARE HANDOFF
Emergency Department Sign Out Note        Sign out and transfer of care from Dr Xiomy Webb. See Separate Emergency Department note. The patient, Ericka Altman, was evaluated by the previous provider for abdominal pain . Workup Completed:        ED Course / Workup Pending (followup):                                      ED Course as of 07/11/23 0236   Mon Jul 10, 2023   2221 Sign out:     Pt from Dementia care unit   Pt has baseline dementia   Pt here for abdominal pain   CT head bc he has falls at [facility]   CT abd/pelv  Empiric abx ordered  Pt will likely need to be admitted for sepsis    2310 Pt resting comfortably   2319 CBC and differential(!)   2319 PTT: 26   2319 Procalcitonin: 0.10   2319 Manual Differential(PHLEBS Do Not Order)(!)   2319 Lipase: 29   2319 hs TnI 0hr: 12   2319 Protime-INR   2319 Comprehensive metabolic panel(!)   9018 CT BRAIN - WITHOUT CONTRAST     INDICATION:   from dementia unit with persistent vomiting.     COMPARISON: Multiple priors most recently 6/27/2023     TECHNIQUE:  CT examination of the brain was performed. Multiplanar 2D reformatted images were created from the source data.     Radiation dose length product (DLP) for this visit:  812.82 mGy-cm . This examination, like all CT scans performed in the [facility], was performed utilizing techniques to minimize radiation dose exposure, including the use of iterative   reconstruction and automated exposure control.     IMAGE QUALITY:  Diagnostic.     FINDINGS:     PARENCHYMA: Decreased attenuation is noted in periventricular and subcortical white matter demonstrating an appearance that is statistically most likely to represent advanced microangiopathic change.     No CT signs of acute infarction. No intracranial mass, mass effect or midline shift.   No acute parenchymal hemorrhage.     VENTRICLES AND EXTRA-AXIAL SPACES: Volume loss without hydrocephalus     VISUALIZED ORBITS: Normal visualized orbits.     PARANASAL SINUSES: Normal visualized paranasal sinuses.     CALVARIUM AND EXTRACRANIAL SOFT TISSUES:  Normal.     IMPRESSION:     No acute intracranial abnormality. Chronic microangiopathic changes.      2319 CT ABDOMEN AND PELVIS WITH IV CONTRAST       IMPRESSION:     CT findings may suggest enteritis.     Patchy bibasilar groundglass opacities, nonspecific. Correlate for atypical/viral infection or aspiration.     Subacute left ischial tuberosity fracture. 2320 LACTIC ACID(!!): 4.0   2320 Serjio Ano w/ Slim aware of admission    2347 Serjio Ano w/ Slim accepts to IP for sepsis to Dr Areli Robledo    Patient with history as above presented with Patient presents with:  V[place]iting: [place] all day. From dementia unit    History obtained from Sign out, EMS    Patient was nontoxic, stable. Exam as above      Differential diagnosis considered. Overall presentation is consistent with sepsis/pneumoni  Low suspicion for acs, surgical causes of symtpoms      Patient was treated with IVF, Ceftriaxone    Discussed case with VERONIKA, who agreed to admit patient for Vomiting (R11.10), sepsis, pneumonia, lactic acidosis    This medical documentation was created using an electronic medical record system with [facility] Modal voice recognition. Although this document has been carefully reviewed, there may still be some phonetic and typographical errors. These errors are purely typographical and due to imperfections of the software program, do not reflect any compromise in the patient's medical care. Abdominal pain: acute illness or injury  Lactic acidosis: acute illness or injury  Sepsis [facility]): acute illness or injury  Amount and/or Complexity of Data Reviewed  Labs: ordered. Decision-making details documented in ED Course. Radiology: ordered. Risk  Decision regarding hospitalization.               Disposition  Final diagnoses:   None     ED Disposition     None      Follow-up Information    None       Patient's Medications   Discharge Prescriptions    No medications on file     No discharge procedures on file.        ED Provider  Electronically Signed by     Sandi Disla MD  07/11/23 7399

## 2023-07-11 NOTE — CONSULTS
[facility] - Inpatient Consultation  Claudia De La Garza 81 y.o. male MRN: [id number]  Unit/Bed#: ED 20 Encounter: [id number]    Reason for Consult / Principal Problem:     Enteritis    ASSESSMENT & PLAN:    81-year-old male with past medical history significant for dementia, anxiety/depression, hypertension, GERD, COPD/emphysema who presented to the ED on 7/10 from [facility] unit  nursing home for complaint of persistent vomiting x1 day. GI has been consulted for evaluation of possible enteritis    1. Nausea and vomiting  2. Abdominal pain  Patient presented from [facility] unit [facility] for 1 day of nausea and vomiting with associated abdominal pain per chart review. At time of my evaluation, patient reports no symptoms and has no pain with palpation of abdomen. Patient likely with viral gastroenteritis supported by CT imaging findings, labs, and overall clinical picture. Currently symptoms appear improved  · Recommend supportive measures with IV fluids and antiemetics as needed  · Would start with CLD or bland diet and advance as tolerated  · Continue home PPI  · Monitor intake and output closely    ______________________________________________________________________    HISTORY OF PRESENT ILLNESS:   81-year-old male with past medical history significant for dementia, anxiety/depression, hypertension, GERD, COPD/emphysema who presented to the ED on 7/10 from his nursing home for complaint of vomiting and abdominal pain. Unfortunately, due to patient's underlying dementia he is poor historian. Per chart review, patient was brought in by EMS from [facility] unit of [facility] due to persistent vomiting throughout the day. On arrival, patient noted to have lactate of 4.0 with elevated creatinine. Imaging on presentation significant for fluid-filled mildly hyperenhancing small bowel loops throughout the abdomen consistent with enteritis.   Also noted, patchy bibasilar groundglass opacities concern for aspiration. At time of my evaluation, patient denies abdominal pain, nausea, vomiting, diarrhea and reports he just wants to sleep. REVIEW OF SYSTEMS: Unable to be completed as patient with significant underlying dementia    Historical Information   Past Medical History:   Diagnosis Date   • Anemia    • Anxiety    • Dementia ([address])    • Depression    • Hyperlipidemia    • Hypertension      History reviewed. No pertinent surgical history. Social History   Social History     Substance and Sexual Activity   Alcohol Use Not Currently     Social History     Substance and Sexual Activity   Drug Use Never     Social History     Tobacco Use   Smoking Status Former   • Types: Cigarettes   Smokeless Tobacco Never     Family History   Problem Relation Age of Onset   • No Known Problems Mother    • No Known Problems Father        Meds/Allergies   (Not in a hospital admission)    Current Facility-Administered Medications   Medication Dose Route Frequency   • acetaminophen (TYLENOL) tablet 650 mg  650 mg Oral Q4H PRN   • aspirin (ECOTRIN LOW STRENGTH) EC tablet 81 mg  81 mg Oral Daily   • buPROPion (WELLBUTRIN XL) 24 hr tablet 150 mg  150 mg Oral Daily   • heparin (porcine) subcutaneous injection 5,000 Units  5,000 Units Subcutaneous Q12H [facility]   • LORazepam (ATIVAN) tablet 0.5 mg  0.5 mg Oral Q6H PRN   • ondansetron (ZOFRAN) injection 4 mg  4 mg Intravenous Q6H PRN   • pantoprazole (PROTONIX) EC tablet 40 mg  40 mg Oral Daily   • piperacillin-tazobactam (ZOSYN) IVPB 2.25 g  2.25 g Intravenous Q6H   • QUEtiapine (SEROquel) tablet 25 mg  25 mg Oral BID     No Known Allergies    PHYSICAL EXAM:      Objective   Blood pressure 100/52, pulse 66, temperature 98.3 °F (36.8 °C), temperature source Oral, resp. rate 16, SpO2 93 %. There is no height or weight on file to calculate BMI.     Intake/Output Summary (Last 24 hours) at 7/11/2023 1206  Last data filed at 7/11/2023 0442  Gross per 24 hour Intake 2100 ml   Output --   Net 2100 ml       General Appearance:   Alert, cooperative, no distress   HEENT:   Normocephalic, atraumatic, anicteric     Neck:   Supple, symmetrical, trachea midline   Lungs:   Equal chest rise, respirations unlabored    Heart:   Regular rate and rhythm   Abdomen:   Soft, non-tender, non-distended; normal bowel sounds; no masses, no organomegaly    Rectal:   Deferred    Extremities:   No cyanosis, clubbing or edema    Neuro: Moves all 4 extremities, alert and oriented to self only   Skin:   No jaundice, rashes, or lesions      LAB RESULTS:     Admission on 07/10/2023   Component Date Value   • WBC 07/10/2023 27.22 (H)    • RBC 07/10/2023 4.68    • Hemoglobin 07/10/2023 14.9    • Hematocrit 07/10/2023 47.4    • MCV 07/10/2023 101 (H)    • MCH 07/10/2023 31.8    • MCHC 07/10/2023 31.4    • RDW 07/10/2023 15.3 (H)    • MPV 07/10/2023 9.3    • Platelets 41/19/0677 334    • Sodium 07/10/2023 131 (L)    • Potassium 07/10/2023 5.4 (H)    • Chloride 07/10/2023 102    • CO2 07/10/2023 17 (L)    • ANION GAP 07/10/2023 12    • BUN 07/10/2023 36 (H)    • Creatinine 07/10/2023 1.35 (H)    • Glucose 07/10/2023 107    • Calcium 07/10/2023 10.1    • AST 07/10/2023 25    • ALT 07/10/2023 21    • Alkaline Phosphatase 07/10/2023 65    • Total Protein 07/10/2023 8.8 (H)    • Albumin 07/10/2023 4.0    • Total Bilirubin 07/10/2023 0.42    • eGFR 07/10/2023 46    • Lipase 07/10/2023 29    • hs TnI 0hr 07/10/2023 12    • Ventricular Rate 07/10/2023 104    • Atrial Rate 07/10/2023 104    • TX Interval 07/10/2023 208    • QRSD Interval 07/10/2023 72    • QT Interval 07/10/2023 328    • QTC Interval 07/10/2023 431    • P Axis 07/10/2023 72    • QRS Axis 07/10/2023 -45    • T Wave Axis 07/10/2023 82    • Blood Culture 07/10/2023 Received in Microbiology Lab. Culture in Progress. • Blood Culture 07/10/2023 Received in Microbiology Lab. Culture in Progress.     • LACTIC ACID 07/10/2023 4.0 ()    • Procalcitonin 07/10/2023 0.10    • Protime 07/10/2023 13.9    • INR 07/10/2023 1.07    • PTT 07/10/2023 26    • hs TnI 2hr 07/11/2023 16    • Delta 2hr hsTnI 07/11/2023 4    • Segmented % 07/10/2023 84 (H)    • Bands % 07/10/2023 2    • Lymphocytes % 07/10/2023 7 (L)    • Monocytes % 07/10/2023 5    • Eosinophils, % 07/10/2023 0    • Basophils % 07/10/2023 1    • Metamyelocytes% 07/10/2023 1    • Absolute Neutrophils 07/10/2023 23.41 (H)    • Lymphocytes Absolute 07/10/2023 1.91    • Monocytes Absolute 07/10/2023 1.36 (H)    • Eosinophils Absolute 07/10/2023 0.00    • Basophils Absolute 07/10/2023 0.27 (H)    • RBC Morphology 07/10/2023 Normal    • Platelet Estimate 89/59/7684 Adequate    • LACTIC ACID 07/11/2023 2.7 (HH)    • Legionella Urinary Antig* 07/11/2023 Negative    • Strep pneumoniae antigen* 07/11/2023 Negative    • Procalcitonin 07/11/2023 0.25    • Sodium 07/11/2023 135    • Potassium 07/11/2023 4.6    • Chloride 07/11/2023 106    • CO2 07/11/2023 18 (L)    • ANION GAP 07/11/2023 11    • BUN 07/11/2023 32 (H)    • Creatinine 07/11/2023 1.11    • Glucose 07/11/2023 93    • Calcium 07/11/2023 8.1 (L)    • eGFR 07/11/2023 59    • WBC 07/11/2023 22.68 (H)    • RBC 07/11/2023 3.48 (L)    • Hemoglobin 07/11/2023 11.2 (L)    • Hematocrit 07/11/2023 34.3 (L)    • MCV 07/11/2023 99 (H)    • MCH 07/11/2023 32.2    • MCHC 07/11/2023 32.7    • RDW 07/11/2023 15.3 (H)    • Platelets 09/97/2662 236    • MPV 07/11/2023 9.5        RADIOLOGY RESULTS: I have personally reviewed pertinent imaging studies.

## 2023-07-11 NOTE — NUTRITION
07/11/23 1348   Biochemical Data,Medical Tests, and Procedures   Biochemical Data/Medical Tests/Procedures Lab values reviewed; Meds reviewed   Labs (Comment) 7/11/23 BUN 32, Ca 8.1, 11.2/34.3, lactic acid 2.7   Meds (Comment) protonix, heparin   Nutrition-Focused Physical Exam   Nutrition-Focused Physical Exam Findings RN skin assessment reviewed; No edema documented; No skin issues documented;Muscle Loss   Nutrition-Focused Physical Exam Findings moderate temporal muscle loss and moderate clavicle muscle loss   Medical-Related Concerns pneumonia, HTN, HLD, dementia   Current PO Intake   Current Diet Order Regular, Mechanical Soft, thin liquids   Current Meal Intake Other (Comment)  (Unknown, diet recently advanced from NPO)   Estimated calorie intake compared to estimated need Meal intake unknown at this time. PES Statement   Energy Balance (1) Predicted suboptimal energy intake NI-1.4   Related to Decreased appetite   As evidenced by: Loss of muscle mass   Recommendations/Interventions   Malnutrition/BMI Present No  (one criteria met only at this time)   Summary ST consulted; Low BMI 17.49. Presents from nursing home with abdominal pain and vomiting. Past medical history significant for pneumonia, HTN, HLD, dementia. Weight history reviewed - data limited. Noted possible 21# increase in 7 months, unsure accuracy. No edema. No pressure areas. Prescribed a Regular diet, Mechanical Soft, thin liquids. Meal intake unknown at this time. Pt was sleeping at time of visit. Noted moderate temporal muscle loss and moderate clavicle muscle loss. One malnutrition criteria met at this time. RD to monitor PO intakes and reassess for malnutrition at follow up.    Interventions/Recommendations Continue current diet order;Monitor I & O's;Obtain current weight;Obtain current height   Education Assessment   Education Patient/caregiver not appropriate for education at this time   Patient Nutrition Goals   Goal Meet PO needs; Tolerate PO diet

## 2023-07-11 NOTE — ASSESSMENT & PLAN NOTE
· Present on admission   · 7/10 CT abdomen pelvis: Noted patchy bibasilar groundglass opacities, suggested correlating for atypical/viral infection or aspiration  · Initiated on Zosyn in the ED, continued on floor  · Pulmonology consulted, appreciate recommendations  · Speech therapy consulted, appreciate recommendations  · Continue Xopenex treatments  · Tessalon Perles as needed for cough  · We will trend procalcitonin, plan to discontinue and monitor off of antibiotics tomorrow if procalcitonin negative  · Encourage out of bed to chair  · Incentive spirometry  · CBC, procalcitonin in a.m.

## 2023-07-11 NOTE — ED PROVIDER NOTES
History  Chief Complaint   Patient presents with   • Vomiting     [place] all day. From dementia unit     81-year-old male presenting from dementia unit for reports of severe abdominal pain and nausea and vomiting. Patient states that he vomited up the pills in the food the a.m. at the facility. Patient believes he is in [facility] otherwise no complaints. Prior to Admission Medications   Prescriptions Last Dose Informant Patient Reported? Taking?    Aspirin 81 MG CAPS 7/11/2023  Yes Yes   Sig: Take 81 mg by mouth daily   LORazepam (ATIVAN) 0.5 mg tablet   Yes No   Sig: Take 0.5 mg by mouth every 6 (six) hours as needed for anxiety   PANTOPRAZOLE SODIUM PO 7/11/2023  Yes Yes   Sig: Take 40 mg by mouth in the morning   QUEtiapine (SEROquel) 25 mg tablet 7/10/2023  Yes Yes   Sig: Take 25 mg by mouth 2 (two) times a day   ascorbic acid (VITAMIN C) 250 mg tablet 7/10/2023  Yes Yes   Sig: Take 500 mg by mouth 2 (two) times a day   buPROPion (WELLBUTRIN SR) 150 mg 12 hr tablet 7/11/2023  Yes Yes   Sig: Take 150 mg by mouth 2 (two) times a day   calcium carbonate (OS-URSULA) 600 MG tablet 7/10/2023  Yes Yes   Sig: Take 600 mg by mouth daily   cholecalciferol (VITAMIN D3) 1,000 units tablet 7/11/2023  Yes Yes   Sig: Take 1,000 Units by mouth daily   ferrous sulfate 325 (65 Fe) mg tablet 7/11/2023  Yes Yes   Sig: Take 325 mg by mouth daily with breakfast   loperamide (IMODIUM) 2 mg capsule Unknown  No No   Sig: Take 1 capsule (2 mg total) by mouth 4 (four) times a day as needed for diarrhea   loratadine (CLARITIN) 10 mg tablet 7/11/2023  Yes Yes   Sig: Take 10 mg by mouth daily   megestrol (MEGACE) 40 MG/ML suspension Unknown  Yes No   Sig: Take 400 mg by mouth 2 (two) times a day   pravastatin (PRAVACHOL) 80 mg tablet 7/10/2023  Yes Yes   Sig: Take 80 mg by mouth daily at bedtime   senna (SENOKOT) 8.6 MG tablet Unknown  Yes No   Sig: Take 1 tablet by mouth daily      Facility-Administered Medications: None       Past Medical History:   Diagnosis Date   • Anemia    • Anxiety    • Dementia ([address])    • Depression    • Hyperlipidemia    • Hypertension        History reviewed. No pertinent surgical history. Family History   Problem Relation Age of Onset   • No Known Problems Mother    • No Known Problems Father      I have reviewed and agree with the history as documented. E-Cigarette/Vaping     E-Cigarette/Vaping Substances     Social History     Tobacco Use   • Smoking status: Former     Types: Cigarettes   • Smokeless tobacco: Never   Substance Use Topics   • Alcohol use: Not Currently   • Drug use: Never       Review of Systems   Gastrointestinal: Positive for abdominal pain and vomiting. All other systems reviewed and are negative. Physical Exam  Physical Exam  Vitals and nursing note reviewed. Constitutional:       General: He is not in acute distress. Appearance: He is well-developed. He is not diaphoretic. HENT:      Head: Normocephalic and atraumatic. Right Ear: External ear normal.      Left Ear: External ear normal.      Nose: Nose normal.   Eyes:      General: No scleral icterus. Right eye: No discharge. Left eye: No discharge. Conjunctiva/sclera: Conjunctivae normal.   Cardiovascular:      Rate and Rhythm: Normal rate and regular rhythm. Heart sounds: Normal heart sounds. No murmur heard. No friction rub. No gallop. Pulmonary:      Effort: Pulmonary effort is normal. No respiratory distress. Breath sounds: Normal breath sounds. No wheezing or rales. Abdominal:      General: A surgical scar is present. Bowel sounds are normal. There is no distension. Palpations: Abdomen is soft. There is no mass. Tenderness: There is generalized abdominal tenderness. There is no right CVA tenderness, left CVA tenderness or guarding. Musculoskeletal:         General: No tenderness or deformity.  Normal range of motion. Cervical back: Normal range of motion and neck supple. Skin:     General: Skin is warm and dry. Coloration: Skin is not pale. Findings: No erythema or rash. Neurological:      Mental Status: He is alert and oriented to person, place, and time. Psychiatric:         Behavior: Behavior normal.         Thought Content:  Thought content normal.         Judgment: Judgment normal.         Vital Signs  ED Triage Vitals   Temperature Pulse Respirations Blood Pressure SpO2   07/10/23 2107 07/10/23 2107 07/10/23 2107 07/10/23 2107 07/10/23 2107   (!) 97 °F (36.1 °C) 104 18 130/77 97 %      Temp Source Heart Rate Source Patient Position - Orthostatic VS BP Location FiO2 (%)   07/11/23 0839 07/10/23 2107 07/10/23 2107 07/10/23 2107 --   Oral Monitor Lying Left arm       Pain Score       07/11/23 0850       No Pain           Vitals:    07/11/23 1439 07/11/23 1944 07/11/23 2249 07/12/23 0704   BP: 109/54  107/50 (!) 108/48   Pulse: 65 72 61 81   Patient Position - Orthostatic VS:    Lying         Visual Acuity      ED Medications  Medications   piperacillin-tazobactam (ZOSYN) IVPB 2.25 g (2.25 g Intravenous New Bag 7/12/23 0526)   acetaminophen (TYLENOL) tablet 650 mg (650 mg Oral Given 7/12/23 0329)   ondansetron (ZOFRAN) injection 4 mg (has no administration in time range)   heparin (porcine) subcutaneous injection 5,000 Units (5,000 Units Subcutaneous Given 7/11/23 5387)   aspirin (ECOTRIN LOW STRENGTH) EC tablet 81 mg (81 mg Oral Given 7/11/23 0841)   buPROPion (WELLBUTRIN XL) 24 hr tablet 150 mg (150 mg Oral Given 7/11/23 0841)   LORazepam (ATIVAN) tablet 0.5 mg (0.5 mg Oral Given 7/11/23 0119)   pantoprazole (PROTONIX) EC tablet 40 mg (40 mg Oral Given 7/11/23 0842)   QUEtiapine (SEROquel) tablet 25 mg (25 mg Oral Given 7/11/23 1725)   levalbuterol (XOPENEX) inhalation solution 1.25 mg (1.25 mg Nebulization Given 7/12/23 0742)   Diclofenac Sodium (VOLTAREN) 1 % topical gel 2 g (2 g Topical Not Given 7/12/23 0607)   iohexol (OMNIPAQUE) 350 MG/ML injection (SINGLE-DOSE) 100 mL (100 mL Intravenous Given 7/10/23 2211)   piperacillin-tazobactam (ZOSYN) IVPB 3.375 g (0 g Intravenous Stopped 7/11/23 0008)   multi-electrolyte (PLASMALYTE-A/ISOLYTE-S PH 7.4) IV solution 1,000 mL (0 mL Intravenous Stopped 7/11/23 0116)     Followed by   multi-electrolyte (PLASMALYTE-A/ISOLYTE-S PH 7.4) IV solution 1,000 mL (0 mL Intravenous Stopped 7/11/23 0442)       Diagnostic Studies  Results Reviewed     Procedure Component Value Units Date/Time    Procalcitonin [[id number]]  (Abnormal) Collected: 07/12/23 0444    Lab Status: Final result Specimen: Blood from Arm, Left Updated: 07/12/23 0522     Procalcitonin 0.29 ng/ml     Basic metabolic panel [[id number]]  (Abnormal) Collected: 07/12/23 0444    Lab Status: Final result Specimen: Blood from Arm, Left Updated: 07/12/23 0515     Sodium 136 mmol/L      Potassium 4.2 mmol/L      Chloride 108 mmol/L      CO2 21 mmol/L      ANION GAP 7 mmol/L      BUN 28 mg/dL      Creatinine 1.20 mg/dL      Glucose 96 mg/dL      Calcium 8.5 mg/dL      eGFR 54 ml/min/1.73sq m     Narrative:      [facility] guidelines for Chronic Kidney Disease (CKD):   •  Stage 1 with normal or high GFR (GFR > 90 mL/min/1.73 square meters)  •  Stage 2 Mild CKD (GFR = 60-89 mL/min/1.73 square meters)  •  Stage 3A Moderate CKD (GFR = 45-59 mL/min/1.73 square meters)  •  Stage 3B Moderate CKD (GFR = 30-44 mL/min/1.73 square meters)  •  Stage 4 Severe CKD (GFR = 15-29 mL/min/1.73 square meters)  •  Stage 5 End Stage CKD (GFR <15 mL/min/1.73 square meters)  Note: GFR calculation is accurate only with a steady state creatinine    Phosphorus [[id number]]  (Normal) Collected: 07/12/23 0444    Lab Status: Final result Specimen: Blood from Arm, Left Updated: 07/12/23 0515     Phosphorus 3.2 mg/dL     Magnesium [[id number]]  (Normal) Collected: 07/12/23 0444    Lab Status: Final result Specimen: Blood from Arm, Left Updated: 07/12/23 0515     Magnesium 2.0 mg/dL     CBC and differential [[id number]]  (Abnormal) Collected: 07/12/23 0444    Lab Status: Final result Specimen: Blood from Arm, Left Updated: 07/12/23 0458     WBC 16.16 Thousand/uL      RBC 3.45 Million/uL      Hemoglobin 11.1 g/dL      Hematocrit 34.5 %       fL      MCH 32.2 pg      MCHC 32.2 g/dL      RDW 15.4 %      MPV 9.5 fL      Platelets 202 Thousands/uL      nRBC 0 /100 WBCs      Neutrophils Relative 84 %      Immat GRANS % 1 %      Lymphocytes Relative 8 %      Monocytes Relative 7 %      Eosinophils Relative 0 %      Basophils Relative 0 %      Neutrophils Absolute 13.50 Thousands/µL      Immature Grans Absolute 0.16 Thousand/uL      Lymphocytes Absolute 1.30 Thousands/µL      Monocytes Absolute 1.16 Thousand/µL      Eosinophils Absolute 0.00 Thousand/µL      Basophils Absolute 0.04 Thousands/µL     Blood culture #1 [[id number]] Collected: 07/10/23 2159    Lab Status: Preliminary result Specimen: Blood from Arm, Right Updated: 07/11/23 1001     Blood Culture Received in Microbiology Lab. Culture in Progress. Blood culture #2 [[id number]] Collected: 07/10/23 2149    Lab Status: Preliminary result Specimen: Blood from Arm, Right Updated: 07/11/23 1001     Blood Culture Received in Microbiology Lab. Culture in Progress.     Legionella antigen, Urine [[id number]]  (Normal) Collected: 07/11/23 0114    Lab Status: Final result Specimen: Urine, Clean Catch Updated: 07/11/23 0900     Legionella Urinary Antigen Negative    Strep Pneumoniae, Urine [[id number]]  (Normal) Collected: 07/11/23 0114    Lab Status: Final result Specimen: Urine, Clean Catch Updated: 07/11/23 0900     Strep pneumoniae antigen, urine Negative    Procalcitonin, Next Day AM Collection [[id number]]  (Normal) Collected: 07/11/23 0636    Lab Status: Final result Specimen: Blood from Arm, Right Updated: 07/11/23 0716     Procalcitonin 0.25 ng/ml     Basic metabolic panel [[id number]]  (Abnormal) Collected: 07/11/23 0636    Lab Status: Final result Specimen: Blood from Arm, Right Updated: 07/11/23 0712     Sodium 135 mmol/L      Potassium 4.6 mmol/L      Chloride 106 mmol/L      CO2 18 mmol/L      ANION GAP 11 mmol/L      BUN 32 mg/dL      Creatinine 1.11 mg/dL      Glucose 93 mg/dL      Calcium 8.1 mg/dL      eGFR 59 ml/min/1.73sq m     Narrative:      [facility] guidelines for Chronic Kidney Disease (CKD):   •  Stage 1 with normal or high GFR (GFR > 90 mL/min/1.73 square meters)  •  Stage 2 Mild CKD (GFR = 60-89 mL/min/1.73 square meters)  •  Stage 3A Moderate CKD (GFR = 45-59 mL/min/1.73 square meters)  •  Stage 3B Moderate CKD (GFR = 30-44 mL/min/1.73 square meters)  •  Stage 4 Severe CKD (GFR = 15-29 mL/min/1.73 square meters)  •  Stage 5 End Stage CKD (GFR <15 mL/min/1.73 square meters)  Note: GFR calculation is accurate only with a steady state creatinine    CBC (With Platelets) [[id number]]  (Abnormal) Collected: 07/11/23 0636    Lab Status: Final result Specimen: Blood from Arm, Right Updated: 07/11/23 0710     WBC 22.68 Thousand/uL      RBC 3.48 Million/uL      Hemoglobin 11.2 g/dL      Hematocrit 34.3 %      MCV 99 fL      MCH 32.2 pg      MCHC 32.7 g/dL      RDW 15.3 %      Platelets 632 Thousands/uL      MPV 9.5 fL     Lactic acid 2 Hours [[id number]]  (Abnormal) Collected: 07/11/23 0114    Lab Status: Final result Specimen: Blood from Arm, Right Updated: 07/11/23 0202     LACTIC ACID 2.7 mmol/L     Narrative:      Result may be elevated if tourniquet was used during collection.     HS Troponin I 2hr [[id number]]  (Normal) Collected: 07/11/23 0114    Lab Status: Final result Specimen: Blood from Arm, Right Updated: 07/11/23 0146     hs TnI 2hr 16 ng/L      Delta 2hr hsTnI 4 ng/L     Sputum culture and Gram stain [[id number]]     Lab Status: No result Specimen: Sputum     Lactic acid, plasma (w/reflex if result > 2.0) [[id number]]  (Abnormal) Collected: 07/10/23 2149    Lab Status: Final result Specimen: Blood from Arm, Right Updated: 07/10/23 2319     LACTIC ACID 4.0 mmol/L     Narrative:      Result may be elevated if tourniquet was used during collection. Procalcitonin [[id number]]  (Normal) Collected: 07/10/23 2149    Lab Status: Final result Specimen: Blood from Arm, Right Updated: 07/10/23 2228     Procalcitonin 0.10 ng/ml     Protime-INR [[id number]]  (Normal) Collected: 07/10/23 2149    Lab Status: Final result Specimen: Blood from Arm, Right Updated: 07/10/23 2214     Protime 13.9 seconds      INR 1.07    APTT [[id number]]  (Normal) Collected: 07/10/23 2149    Lab Status: Final result Specimen: Blood from Arm, Right Updated: 07/10/23 2214     PTT 26 seconds     RBC Morphology Reflex Test [[id number]] Collected: 07/10/23 2108    Lab Status: Final result Specimen: Blood from Arm, Right Updated: 07/10/23 2201    CBC and differential [[id number]]  (Abnormal) Collected: 07/10/23 2108    Lab Status: Final result Specimen: Blood from Arm, Right Updated: 07/10/23 2157     WBC 27.22 Thousand/uL      RBC 4.68 Million/uL      Hemoglobin 14.9 g/dL      Hematocrit 47.4 %       fL      MCH 31.8 pg      MCHC 31.4 g/dL      RDW 15.3 %      MPV 9.3 fL      Platelets 708 Thousands/uL     Narrative: This is an appended report. These results have been appended to a previously verified report.     Manual Differential(PHLEBS Do Not Order) [[id number]]  (Abnormal) Collected: 07/10/23 2108    Lab Status: Final result Specimen: Blood from Arm, Right Updated: 07/10/23 2157     Segmented % 84 %      Bands % 2 %      Lymphocytes % 7 %      Monocytes % 5 %      Eosinophils, % 0 %      Basophils % 1 %      Metamyelocytes% 1 %      Absolute Neutrophils 23.41 Thousand/uL      Lymphocytes Absolute 1.91 Thousand/uL      Monocytes Absolute 1.36 Thousand/uL      Eosinophils Absolute 0.00 Thousand/uL      Basophils Absolute 0.27 Thousand/uL      Total Counted --     RBC Morphology Normal     Platelet Estimate Adequate    Comprehensive metabolic panel [[id number]]  (Abnormal) Collected: 07/10/23 2108    Lab Status: Final result Specimen: Blood from Arm, Right Updated: 07/10/23 2152     Sodium 131 mmol/L      Potassium 5.4 mmol/L      Chloride 102 mmol/L      CO2 17 mmol/L      ANION GAP 12 mmol/L      BUN 36 mg/dL      Creatinine 1.35 mg/dL      Glucose 107 mg/dL      Calcium 10.1 mg/dL      AST 25 U/L      ALT 21 U/L      Alkaline Phosphatase 65 U/L      Total Protein 8.8 g/dL      Albumin 4.0 g/dL      Total Bilirubin 0.42 mg/dL      eGFR 46 ml/min/1.73sq m     Narrative:      [facility] guidelines for Chronic Kidney Disease (CKD):   •  Stage 1 with normal or high GFR (GFR > 90 mL/min/1.73 square meters)  •  Stage 2 Mild CKD (GFR = 60-89 mL/min/1.73 square meters)  •  Stage 3A Moderate CKD (GFR = 45-59 mL/min/1.73 square meters)  •  Stage 3B Moderate CKD (GFR = 30-44 mL/min/1.73 square meters)  •  Stage 4 Severe CKD (GFR = 15-29 mL/min/1.73 square meters)  •  Stage 5 End Stage CKD (GFR <15 mL/min/1.73 square meters)  Note: GFR calculation is accurate only with a steady state creatinine    Lipase [[id number]]  (Normal) Collected: 07/10/23 2108    Lab Status: Final result Specimen: Blood from Arm, Right Updated: 07/10/23 2152     Lipase 29 u/L     HS Troponin 0hr (reflex protocol) [[id number]]  (Normal) Collected: 07/10/23 2108    Lab Status: Final result Specimen: Blood from Arm, Right Updated: 07/10/23 2147     hs TnI 0hr 12 ng/L                  CT abdomen pelvis with contrast   Final Result by Maryjo Bender DO (07/10 2305)      CT findings may suggest enteritis. Patchy bibasilar groundglass opacities, nonspecific. Correlate for atypical/viral infection or aspiration. Subacute left ischial tuberosity fracture. Additional nonacute findings, as described      The study was marked in EPIC for immediate notification.       Workstation performed: [id number]         CT head without contrast   Final Result by Jenelle Solitario DO (07/10 2249)      No acute intracranial abnormality. Chronic microangiopathic changes. Workstation performed: [id number]                    Procedures  Procedures         ED Course             HEART Risk Score    Flowsheet Row Most Recent Value   Heart Score Risk Calculator    History 0 Filed at: 07/12/2023 0831   ECG 1 Filed at: 07/12/2023 0831   Age 2 Filed at: 07/12/2023 0831   Risk Factors 2 Filed at: 07/12/2023 0831   Troponin 0 Filed at: 07/12/2023 0831   HEART Score 5 Filed at: 07/12/2023 0831                     Initial Sepsis Screening     Row Name 07/11/23 0048                Is the patient's history suggestive of a new or worsening infection? Yes (Proceed)  -AI        Suspected source of infection acute abdominal infection  -AI        Indicate SIRS criteria Leukocytosis (WBC > 22194 IJL) OR Leukopenia (WBC <4000 IJL) OR Bandemia (WBC >10% bands); Tachycardia > 90 bpm  -AI        Are two or more of the above signs & symptoms of infection both present and new to the patient? Yes (Proceed)  -AI        Assess for evidence of organ dysfunction: Are any of the below criteria present within 6 hours of suspected infection and SIRS criteria that are NOT considered to be chronic conditions? Lactate >/equal 4.0  -AI        Date of presentation of septic shock 07/11/23  -AI        Time of presentation of septic shock 0000  -AI        Fluid Resuscitation: 30 ml/kg IV fluid bolus will be given based on actual body weight  -AI        Is the patient is persistently hypotensive in the hour after fluid bolus administration? If yes, patient meets criteria for vasopressor use. --        Sepsis Note: Click "NEXT" below (NOT "close") to generate sepsis note based on above information.  --              User Key  (r) = Recorded By, (t) = Taken By, (c) = Cosigned By    [address] [facility] Name Provider Type    VIDYA Dodge PA-C Physician Assistant                SBIRT 20yo+    Flowsheet Row Most Recent Value   Initial Alcohol Screen: US AUDIT-C     1. How often do you have a drink containing alcohol? 0 Filed at: 07/10/2023 2056   2. How many drinks containing alcohol do you have on a typical day you are drinking? 0 Filed at: 07/10/2023 2056   3a. Male UNDER 65: How often do you have five or more drinks on one occasion? 0 Filed at: 07/10/2023 2056   3b. FEMALE Any Age, or MALE 65+: How often do you have 4 or more drinks on one occassion? 0 Filed at: 07/10/2023 2056   Audit-C Score 0 Filed at: 07/10/2023 2056   CRISTINO: How many times in the past year have you. .. Used an illegal drug or used a prescription medication for non-medical reasons? Never Filed at: 07/10/2023 2056                    Medical Decision Making  81-year-old male coming from dementia unit with abdominal pain and nausea and vomiting. We will get blood work, EKG, troponin, CT abdomen pelvis. No complaint of nausea at this time. We will hold nausea medication. Patient not complaining of pain unless palpated. We will hold pain medication at this time as well. Orders expanded to include septic labs as patient with profound leukocytosis and abdominal pain with nausea and vomiting. Also given Cipro and Flagyl pending CT scan for concern for possible intra-abdominal infection. Findings Dr. Mary Jane Gagr pending CT scan and further lab results. Concern for possible sepsis. Amount and/or Complexity of Data Reviewed  Labs: ordered. Radiology: ordered. Risk  Prescription drug management. Decision regarding hospitalization.           Disposition  Final diagnoses:   Sepsis ([address])   Lactic acidosis   Abdominal pain     Time reflects when diagnosis was documented in both MDM as applicable and the Disposition within this note     Time User Action Codes Description Comment    7/10/2023 11:20 PM Britany Lemons [A41.9] Sepsis ([address])     7/10/2023 11:20 PM Britany Lemons [E87.20] Lactic acidosis     7/10/2023 11:20 PM Marchashwin Brown Add [R10.9] Abdominal pain     7/11/2023 12:04 AM Shari Bradshaw D Add [K52.9] Enteritis     7/11/2023 12:08 AM Laelenata Leeann D Add [J18.9] Pneumonia of both lower lobes due to infectious organism       ED Disposition     ED Disposition   Admit    Condition   Stable    Date/Time   Mon Jul 10, 2023 11:23 PM    Comment   Case was discussed with Dimitris Mathur and the patient's admission status was agreed to be Admission Status: inpatient status to the service of Dr. Jonas Ron . Follow-up Information    None         Current Discharge Medication List      CONTINUE these medications which have NOT CHANGED    Details   ascorbic acid (VITAMIN C) 250 mg tablet Take 500 mg by mouth 2 (two) times a day      Aspirin 81 MG CAPS Take 81 mg by mouth daily      buPROPion (WELLBUTRIN SR) 150 mg 12 hr tablet Take 150 mg by mouth 2 (two) times a day      calcium carbonate (OS-URSULA) 600 MG tablet Take 600 mg by mouth daily      cholecalciferol (VITAMIN D3) 1,000 units tablet Take 1,000 Units by mouth daily      ferrous sulfate 325 (65 Fe) mg tablet Take 325 mg by mouth daily with breakfast      loratadine (CLARITIN) 10 mg tablet Take 10 mg by mouth daily      PANTOPRAZOLE SODIUM PO Take 40 mg by mouth in the morning      pravastatin (PRAVACHOL) 80 mg tablet Take 80 mg by mouth daily at bedtime      QUEtiapine (SEROquel) 25 mg tablet Take 25 mg by mouth 2 (two) times a day      loperamide (IMODIUM) 2 mg capsule Take 1 capsule (2 mg total) by mouth 4 (four) times a day as needed for diarrhea  Qty: 12 capsule, Refills: 0    Associated Diagnoses: Diarrhea      LORazepam (ATIVAN) 0.5 mg tablet Take 0.5 mg by mouth every 6 (six) hours as needed for anxiety      megestrol (MEGACE) 40 MG/ML suspension Take 400 mg by mouth 2 (two) times a day      senna (SENOKOT) 8.6 MG tablet Take 1 tablet by mouth daily             No discharge procedures on file.     PDMP Review     None ED Provider  Electronically Signed by           Mike Luna DO  07/12/23 6476

## 2023-07-11 NOTE — CONSULTS
Pulmonary Medicine-Consultation  Tomi Eller 81 y.o. male MRN: [id number]  Unit/Bed#: -01 Encounter: [id number]      Assessment:  1. Pulmonary nodule-2.2 x 1.4 cm/spiculated at the inferior portion of the RUL  2. Sepsis  3. Concern for pneumonia  4. 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 Lactic acidosis    Recommendations/discussion:  • RUL nodule/surrounding GGO does not appear to be new, patient is a high risk, previously family elected not to follow-up on it  • Needs follow-up CT chest in 3 months provided clinical stability to check for changes, if persistent needs PET/CT vs biopsies  o However son has not made his decision yet about follow-up or not, would like to discuss as an outpatient  • Overall clinical picture does not appear to be pneumonia, possibly had episodes of aspiration given the vomiting  • Would recheck Procalcitonin tomorrow, if negative discontinue the antibiotic otherwise may continue for 5 to 7 days  • Aspiration precautions  • Started Xopenex nebs 3 times daily/may be discharged on that  • We will arrange outpatient follow-up with pulmonary at pul return office    Pulmonary will sign off, please do not hesitate to call with any questions      Physician Requesting Consult: Isiah Carter,     Reason for Consult / Principal Problem: Abnormal CT chest    HPI:   81 y.o. male with a h/o anxiety/depression, HTN, dementia, former tobacco abuse, acid reflux, COPD/emphysema. Presented to the ED last night with vomiting from SNF, usually resides in the dementia unit. Thought to have aspirated. Noted to be in sepsis, chest imaging showed bibasilar GGO's. RUL spiculated nodule. Labs with leukocytosis/neutrophilia, PCT 0.10>> 0.25 today negative troponin. Also negative urine antigens for Legionella strep. Admitted to [facility], started on antibiotics with Zosyn. Pulmonary consulted to assist in management. On my assessment, patient is semisitting in bed.   Son Mora Delgado and significant other at bedside. They report prior knowledge of the right lung nodule, states that it had increased in size before on prior imaging about a year ago when they were in [facility]. The son and daughter decided not to pursue further investigation/treatment if this were to be a cancer given the patient's advanced age/dementia. 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 Quit smoking 5 years ago. Inpatient consult to Pulmonology  Consult performed by: Jeanna Kidd MD  Consult ordered by: Rosy Agustin PA-C          Review of Systems  Unable to obtain       Studies:    Imaging Studies: I have personally reviewed pertinent films in PACS    EKG, Pathology, and Other Studies: I have personally reviewed pertinent films in PACS    Pulmonary Results (PFTs, PSG): None in file    Historical Information   Past Medical History:   Diagnosis Date   • Anemia    • Anxiety    • Dementia ([address])    • Depression    • Hyperlipidemia    • Hypertension      History reviewed. No pertinent surgical history. Social History   Social History     Substance and Sexual Activity   Alcohol Use Not Currently     Social History     Substance and Sexual Activity   Drug Use Never     Social History     Tobacco Use   Smoking Status Former   • Types: Cigarettes   Smokeless Tobacco Never     E-Cigarette/Vaping     E-Cigarette/Vaping Substances         Family History:   Family History   Problem Relation Age of Onset   • No Known Problems Mother    • No Known Problems Father        Meds/Allergies   all current active meds have been reviewed    No Known Allergies    Objective   Vitals: Blood pressure 109/54, pulse 65, temperature 98 °F (36.7 °C), temperature source Temporal, resp. rate 18, height 6' (1.829 m), weight 58 kg (127 lb 13.9 oz), SpO2 98 %. ,Body mass index is 17.34 kg/m².     Intake/Output Summary (Last 24 hours) at 7/11/2023 1532  Last data filed at 7/11/2023 0442  Gross per 24 hour   Intake 2100 ml   Output --   Net 2100 ml     Invasive Devices     Peripheral Intravenous Line  Duration           Peripheral IV 06/27/23 Right Antecubital 13 days    Peripheral IV 07/10/23 Right Forearm 1 day                Physical Exam  Body mass index is 17.34 kg/m². Gen: not in acute distress, thin  Neck/Eyes: supple, PERRL  Ear: normal appearance, + hearing impairment  Salivary glands: soft nontender  Chest: normal respiratory efforts, clear breath sounds bilaterally  CV: no murmurs appreciated, no edema  Abdomen: soft, non tender  Extremities:  No observed deformity   Skin: unremarkable  Neuro: no focal motor deficit       Lab Results: I have personally reviewed pertinent lab results. None    Portions of the record may have been created with voice recognition software. Occasional wrong word or "sound a like" substitutions may have occurred due to the inherent limitations of voice recognition software. Read the chart carefully and recognize, using context, where substitutions have occurred.

## 2023-07-11 NOTE — ASSESSMENT & PLAN NOTE
· Pneumonia pathway  · Speech consult  · Pulmonary consult  · CT: Patchy bibasilar groundglass opacities, nonspecific. Correlate for atypical/viral infection or aspiration.
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
## 2023-07-11 NOTE — ASSESSMENT & PLAN NOTE
CT: Nonspecific 2.2 x 1.4 cm spiculated nodule in the subpleural region of the right upper lobe noted with surrounding hazy parenchymal opacity. Recommend follow-up CT chest at 3 months, PET/CT, or tissue sampling per current Fleischner Society guidelines. · Incidental finding on previous workup from fall on 6/27.  F/u w/ PCP

## 2023-07-11 NOTE — CASE MANAGEMENT
Case Management Assessment & Discharge Planning Note    Patient name Dayana Grant /-07 MRN [id number]  : 1935 Date 2023       Current Admission Date: 7/10/2023  Current Admission Diagnosis:Sepsis [facility])   Patient Active Problem List    Diagnosis Date Noted   • Hyponatremia 2023   • Nodule of right lung 07/10/2023   • Sepsis ([address]) 07/10/2023   • Pneumonia due to infectious organism 07/10/2023      LOS (days): 1  Geometric Mean LOS (GMLOS) (days): 5.00  Days to GMLOS:4.3     OBJECTIVE:    Risk of Unplanned Readmission Score: 23.01      Current admission status: Inpatient    Preferred Pharmacy:   [address] [place] [address] [address]  [address] [place] [address]  Phone: [phone] Fax: [phone]    Primary Care Provider: AMANDA Toledo    Primary Insurance: BLUE CROSS  Secondary Insurance: MEDICARE    ASSESSMENT:  [place]uita Proxies    There are no active Health Care Proxies on file. Advance Directives  Does patient have a Health Care POA?: Yes  Does patient have Advance Directives?: Yes  Advance Directives: Living will, Power of  for health care (Copy obtained and scanned into media)  Primary Contact: Dayana Price (Son)   [phone] (Mobile)    Readmission Root Cause  30 Day Readmission: No    Patient Information  Admitted from[de-identified] Facility ([facility])  Mental Status: Confused  During Assessment patient was accompanied by: Not accompanied during assessment  Assessment information provided by[de-identified] Patient, Son, Other - please comment Leonora Francisco from the [facility])  Primary Caregiver: Other (Comment)  Caregiver's Name[de-identified] Staff at [employer] Relationship to Patient[de-identified] Family Member  Support Systems: Family members, Self, Friend, Son, Daughter  [place] of Residence: [facility] do you live in?: [address] [facility] entry access options.  Select all that apply.: No steps to enter home  Type of Current Residence: Facility ([facility])  Upon entering residence, is there a bedroom on the main floor (no further steps)?: Yes  Upon entering residence, is there a bathroom on the main floor (no further steps)?: Yes  In the last 12 months, was there a time when you were not able to pay the mortgage or rent on time?: No  In the last 12 months, how many places have you lived?: 1  In the last 12 months, was there a time when you did not have a steady place to sleep or slept in a shelter (including now)?: No  Homeless/housing insecurity resource given?: N/A  Living Arrangements: Other (Comment) (Staff at [facility])    Activities of Daily Living Prior to Admission  Functional Status:  Total dependent  Completes ADLs independently?: No  Level of ADL dependence: Assistance  Ambulates independently?: No  Level of ambulatory dependence: Assistance  Does patient use assisted devices?: Yes  Assisted Devices (DME) used: Veleta Berry, Wheelchair  Does patient currently own DME?: Yes  What DME does the patient currently own?: Veleta Berry, Wheelchair  Does patient have a history of Outpatient Therapy (PT/OT)?: No  Does the patient have a history of Short-Term Rehab?: No  Does patient have a history of HHC?: No  Does patient currently have [facility]?: No    Patient Information Continued  Income Source: Pension/USP  Does patient have prescription coverage?: Yes  Within the past 12 months, you worried that your food would run out before you got the money to buy more.: Never true  Within the past 12 months, the food you bought just didn't last and you didn't have money to get more.: Never true  Food insecurity resource given?: N/A  Does patient receive dialysis treatments?: No  Does patient have a history of substance abuse?: No  Does patient have a history of Mental Health Diagnosis?: No    Means of Transportation  [facility] Family transport  In the past 12 months, has lack of transportation kept you from medical appointments or from getting medications?: No  In the past 12 months, has lack of transportation kept you from meetings, work, or from getting things needed for daily living?: No    DISCHARGE DETAILS:    Discharge planning discussed with[de-identified] Patient, Tonio Gutierrez. Kendy Bustamante from [facility]  Freedom of Choice: Yes     CM contacted family/caregiver?: Yes  Were Treatment Team discharge recommendations reviewed with patient/caregiver?: Yes     Were patient/caregiver advised of the risks associated with not following Treatment Team discharge recommendations?: Yes    Contacts  Patient Contacts: Patricia Iglesias (Tonio)   [phone] (Mobile)  Relationship to Patient[de-identified] Family  Contact Method: Phone  Phone Number: Patricia Iglesias (Tonio)   [phone] (Mobile)  Reason/Outcome: Emergency Contact, Discharge Planning    Other Referral/Resources/Interventions Provided:  Interventions: Facility Return     Additional Comments: Patient resident at [facility] on locked Dementia Unit. Call to tonio Feliznoa Gutierrez and discussed discharge planning. Call to Kendy Bustamante at [facility] and provided with update and tenetative discharge in 24 hrs.   CM department following thru discharge

## 2023-07-11 NOTE — ASSESSMENT & PLAN NOTE
· CT: Nonspecific 2.2 x 1.4 cm spiculated nodule in the subpleural region of the right upper lobe noted with surrounding hazy parenchymal opacity. Recommend follow-up CT chest at 3 months, PET/CT, or tissue sampling per current Fleischner Society guidelines. · Incidental finding on previous workup from fall on 6/27.   ·  F/u w/ PCP on discharge

## 2023-07-11 NOTE — PROGRESS NOTES
* Sepsis [facility])  Assessment & Plan  Patient sent from dementia unit secondary to vomiting and abdominal pain - possible enteritis  Sepsis noted by leukocytosis and tacycardia  CT: possible enteritis  · NPO  · IV abx  · GI consult  · supportive care      Pneumonia due to infectious organism  Assessment & Plan  · Pneumonia pathway  · Speech consult  · Pulmonary consult  · CT: Patchy bibasilar groundglass opacities, nonspecific. Correlate for atypical/viral infection or aspiration. Hyponatremia  Assessment & Plan  · Present on admission as evidenced by sodium of 131  · Likely hypovolemic hypotonic hyponatremia in the setting of sepsis  · IV fluid hydration  · Trend BMP  · Avoid rapid overcorrection    Nodule of right lung  Assessment & Plan  CT: Nonspecific 2.2 x 1.4 cm spiculated nodule in the subpleural region of the right upper lobe noted with surrounding hazy parenchymal opacity. Recommend follow-up CT chest at 3 months, PET/CT, or tissue sampling per current Fleischner Society guidelines. · Incidental finding on previous workup from fall on 6/27.  F/u w/ PCP

## 2023-07-11 NOTE — SPEECH THERAPY NOTE
Orders received and appreciated, chart review completed. ED visit a few days ago for falling backwards, this ED admission pt experiencing vomiting. He is a resident at [facility]. Patient received resting in bed. Per NSG, patient did not do well with meatball sandwich. Unclear if this is mentation, strength, dentition or other explanation related. Patient was ordered macaroni and cheese, which he tolerated well per his nurse. NSG d/w MD to downgrade patient to more easily chewed, dysphagia appropriate mechanical soft solid diet with thin liquids. Per imaging, pt with CT of chest and results are as follows:  "Mild dependent atelectasis in the lung bases. No lobar airspace consolidation or pneumonia."    Formal swallow evaluation to be completed as soon as able by ST. Please TT with questions or concerns.

## 2023-07-11 NOTE — ASSESSMENT & PLAN NOTE
· Present on admission as evidenced by sodium of 131  · Likely hypovolemic hypotonic hyponatremia in the setting of sepsis  · Patient received IV fluid hydration, hyponatremia resolved  · BMP a.m.

## 2023-07-11 NOTE — SEPSIS NOTE
Sepsis Note   Erasto Tadeo 81 y.o. male MRN: [id number]  Unit/Bed#: ED 20 Encounter: [id number]       Initial Sepsis Screening     Row Name 07/11/23 0048                Is the patient's history suggestive of a new or worsening infection? Yes (Proceed)  -AI        Suspected source of infection acute abdominal infection  -AI        Indicate SIRS criteria Leukocytosis (WBC > 54392 IJL) OR Leukopenia (WBC <4000 IJL) OR Bandemia (WBC >10% bands); Tachycardia > 90 bpm  -AI        Are two or more of the above signs & symptoms of infection both present and new to the patient? Yes (Proceed)  -AI        Assess for evidence of organ dysfunction: Are any of the below criteria present within 6 hours of suspected infection and SIRS criteria that are NOT considered to be chronic conditions? Lactate >/equal 4.0  -AI        Date of presentation of septic shock 07/11/23  -AI        Time of presentation of septic shock 0000  -AI        Fluid Resuscitation: 30 ml/kg IV fluid bolus will be given based on actual body weight  -AI        Is the patient is persistently hypotensive in the hour after fluid bolus administration? If yes, patient meets criteria for vasopressor use. --        Sepsis Note: Click "NEXT" below (NOT "close") to generate sepsis note based on above information. --              User Key  (r) = Recorded By, (t) = Taken By, (c) = Cosigned By    [address] [place] [address] Name Provider Type    VIDYA Hood PA-C Physician Assistant                    There is no height or weight on file to calculate BMI.   Wt Readings from Last 1 Encounters:   06/27/23 58.5 kg (128 lb 15.5 oz)        Ideal body weight: 77.6 kg (171 lb 1.2 oz)

## 2023-07-12 LAB
ANION GAP SERPL CALCULATED.3IONS-SCNC: 7 MMOL/L
BASOPHILS # BLD AUTO: 0.04 THOUSANDS/ÂΜL (ref 0–0.1)
BASOPHILS NFR BLD AUTO: 0 % (ref 0–1)
BUN SERPL-MCNC: 28 MG/DL (ref 5–25)
CALCIUM SERPL-MCNC: 8.5 MG/DL (ref 8.4–10.2)
CHLORIDE SERPL-SCNC: 108 MMOL/L (ref 96–108)
CO2 SERPL-SCNC: 21 MMOL/L (ref 21–32)
CREAT SERPL-MCNC: 1.2 MG/DL (ref 0.6–1.3)
EOSINOPHIL # BLD AUTO: 0 THOUSAND/ÂΜL (ref 0–0.61)
EOSINOPHIL NFR BLD AUTO: 0 % (ref 0–6)
ERYTHROCYTE [DISTWIDTH] IN BLOOD BY AUTOMATED COUNT: 15.4 % (ref 11.6–15.1)
GFR SERPL CREATININE-BSD FRML MDRD: 54 ML/MIN/1.73SQ M
GLUCOSE SERPL-MCNC: 96 MG/DL (ref 65–140)
HCT VFR BLD AUTO: 34.5 % (ref 36.5–49.3)
HGB BLD-MCNC: 11.1 G/DL (ref 12–17)
IMM GRANULOCYTES # BLD AUTO: 0.16 THOUSAND/UL (ref 0–0.2)
IMM GRANULOCYTES NFR BLD AUTO: 1 % (ref 0–2)
LYMPHOCYTES # BLD AUTO: 1.3 THOUSANDS/ÂΜL (ref 0.6–4.47)
LYMPHOCYTES NFR BLD AUTO: 8 % (ref 14–44)
MAGNESIUM SERPL-MCNC: 2 MG/DL (ref 1.9–2.7)
MCH RBC QN AUTO: 32.2 PG (ref 26.8–34.3)
MCHC RBC AUTO-ENTMCNC: 32.2 G/DL (ref 31.4–37.4)
MCV RBC AUTO: 100 FL (ref 82–98)
MONOCYTES # BLD AUTO: 1.16 THOUSAND/ÂΜL (ref 0.17–1.22)
MONOCYTES NFR BLD AUTO: 7 % (ref 4–12)
NEUTROPHILS # BLD AUTO: 13.5 THOUSANDS/ÂΜL (ref 1.85–7.62)
NEUTS SEG NFR BLD AUTO: 84 % (ref 43–75)
NRBC BLD AUTO-RTO: 0 /100 WBCS
PHOSPHATE SERPL-MCNC: 3.2 MG/DL (ref 2.3–4.1)
PLATELET # BLD AUTO: 234 THOUSANDS/UL (ref 149–390)
PMV BLD AUTO: 9.5 FL (ref 8.9–12.7)
POTASSIUM SERPL-SCNC: 4.2 MMOL/L (ref 3.5–5.3)
PROCALCITONIN SERPL-MCNC: 0.29 NG/ML
RBC # BLD AUTO: 3.45 MILLION/UL (ref 3.88–5.62)
SODIUM SERPL-SCNC: 136 MMOL/L (ref 135–147)
WBC # BLD AUTO: 16.16 THOUSAND/UL (ref 4.31–10.16)

## 2023-07-12 PROCEDURE — 84100 ASSAY OF PHOSPHORUS: CPT | Performed by: INTERNAL MEDICINE

## 2023-07-12 PROCEDURE — 99233 SBSQ HOSP IP/OBS HIGH 50: CPT

## 2023-07-12 PROCEDURE — 83735 ASSAY OF MAGNESIUM: CPT | Performed by: INTERNAL MEDICINE

## 2023-07-12 PROCEDURE — 94640 AIRWAY INHALATION TREATMENT: CPT

## 2023-07-12 PROCEDURE — 84145 PROCALCITONIN (PCT): CPT | Performed by: INTERNAL MEDICINE

## 2023-07-12 PROCEDURE — 80048 BASIC METABOLIC PNL TOTAL CA: CPT | Performed by: INTERNAL MEDICINE

## 2023-07-12 PROCEDURE — 94664 DEMO&/EVAL PT USE INHALER: CPT

## 2023-07-12 PROCEDURE — 94760 N-INVAS EAR/PLS OXIMETRY 1: CPT

## 2023-07-12 PROCEDURE — 85025 COMPLETE CBC W/AUTO DIFF WBC: CPT | Performed by: INTERNAL MEDICINE

## 2023-07-12 RX ORDER — BENZONATATE 100 MG/1
100 CAPSULE ORAL 3 TIMES DAILY PRN
Status: DISCONTINUED | OUTPATIENT
Start: 2023-07-12 | End: 2023-07-14 | Stop reason: HOSPADM

## 2023-07-12 RX ADMIN — Medication 2 G: at 08:51

## 2023-07-12 RX ADMIN — ASPIRIN 81 MG: 81 TABLET, COATED ORAL at 08:52

## 2023-07-12 RX ADMIN — ACETAMINOPHEN 650 MG: 325 TABLET ORAL at 20:31

## 2023-07-12 RX ADMIN — PANTOPRAZOLE SODIUM 40 MG: 40 TABLET, DELAYED RELEASE ORAL at 08:51

## 2023-07-12 RX ADMIN — Medication 2 G: at 17:37

## 2023-07-12 RX ADMIN — LEVALBUTEROL HYDROCHLORIDE 1.25 MG: 1.25 SOLUTION RESPIRATORY (INHALATION) at 14:02

## 2023-07-12 RX ADMIN — Medication 2.25 G: at 21:55

## 2023-07-12 RX ADMIN — Medication 2.25 G: at 16:29

## 2023-07-12 RX ADMIN — LEVALBUTEROL HYDROCHLORIDE 1.25 MG: 1.25 SOLUTION RESPIRATORY (INHALATION) at 07:42

## 2023-07-12 RX ADMIN — HEPARIN SODIUM 5000 UNITS: 5000 INJECTION INTRAVENOUS; SUBCUTANEOUS at 08:51

## 2023-07-12 RX ADMIN — Medication 2.25 G: at 10:36

## 2023-07-12 RX ADMIN — Medication 2.25 G: at 05:26

## 2023-07-12 RX ADMIN — HEPARIN SODIUM 5000 UNITS: 5000 INJECTION INTRAVENOUS; SUBCUTANEOUS at 20:31

## 2023-07-12 RX ADMIN — BUPROPION HYDROCHLORIDE 150 MG: 150 TABLET, EXTENDED RELEASE ORAL at 08:52

## 2023-07-12 RX ADMIN — Medication 2 G: at 21:55

## 2023-07-12 RX ADMIN — ACETAMINOPHEN 650 MG: 325 TABLET ORAL at 03:29

## 2023-07-12 RX ADMIN — LEVALBUTEROL HYDROCHLORIDE 1.25 MG: 1.25 SOLUTION RESPIRATORY (INHALATION) at 20:16

## 2023-07-12 RX ADMIN — QUETIAPINE FUMARATE 25 MG: 25 TABLET ORAL at 17:37

## 2023-07-12 RX ADMIN — Medication 2 G: at 12:05

## 2023-07-12 RX ADMIN — QUETIAPINE FUMARATE 25 MG: 25 TABLET ORAL at 08:51

## 2023-07-12 NOTE — PROGRESS NOTES
[id number] [facility]  Progress Note  Name: Mk Corea  MRN: [id number]  Unit/Bed#: -01 I Date of Admission: 7/10/2023   Date of Service: 7/12/2023 I Hospital Day: 2    Assessment/Plan   * Sepsis [facility])  Assessment & Plan  · Patient sent from dementia unit secondary to vomiting and abdominal pain  · Met sepsis criteria on arrival with leukocytosis, tachycardia, in setting of possible enteritis on CT scan  · 7/10 WBC 27.22, continues to trend downward   · 7/10 lactic acid 4.0, received IV fluid resuscitation, repeat 2.7  · 7/10 blood cultures x2 negative to date  · 7/10 CT abdomen pelvis: Suggested enteritis. Also noted patchy bibasilar groundglass opacities, suggest correlate for atypical/viral infection or aspiration   · 7/11 urine for strep pneumonia and Legionella negative  · 7/11 MRSA swab pending  · GI consulted, appreciate recommendations. Felt likely viral gastroenteritis. · Pulmonology consulted, appreciate recommendations  · Speech therapy consulted, appreciate recommendations  · Patient continues on IV Zosyn, will continue today. Plan to monitor off of antibiotics in a.m. pending procalcitonin results and patient presentation  · CBC, procalcitonin in a.m. Pneumonia due to infectious organism  Assessment & Plan  · Present on admission   · 7/10 CT abdomen pelvis: Noted patchy bibasilar groundglass opacities, suggested correlating for atypical/viral infection or aspiration  · Initiated on Zosyn in the ED, continued on floor  · Pulmonology consulted, appreciate recommendations  · Speech therapy consulted, appreciate recommendations  · Continue Xopenex treatments  · Tessalon Perles as needed for cough  · We will trend procalcitonin, plan to discontinue and monitor off of antibiotics tomorrow if procalcitonin negative  · Encourage out of bed to chair  · Incentive spirometry  · CBC, procalcitonin in a.m.       Nodule of right lung  Assessment & Plan  · CT: Nonspecific 2.2 x 1.4 cm spiculated nodule in the subpleural region of the right upper lobe noted with surrounding hazy parenchymal opacity. Recommend follow-up CT chest at 3 months, PET/CT, or tissue sampling per current Fleischner Society guidelines. · Incidental finding on previous workup from fall on . ·  F/u w/ PCP on discharge    Hyponatremia  Assessment & Plan  · Present on admission as evidenced by sodium of 131  · Likely hypovolemic hypotonic hyponatremia in the setting of sepsis  · Patient received IV fluid hydration, hyponatremia resolved  · BMP a.m. VTE Pharmacologic Prophylaxis:   Pharmacologic: Heparin  Mechanical VTE Prophylaxis in Place: Yes    Patient Centered Rounds: I have performed bedside rounds with nursing staff today. Discussions with Specialists or Other Care Team Provider: pulmonology, GI, nursing, case management. Education and Discussions with Family / Patient: Treatment plan discussed with patient who understands what has been explained and is agreeable to the plan as stated. All questions answered to satisfaction. Will update patients son to plan. Time Spent for Care: 41 minutes'. More than 50% of total time spent on counseling and coordination of care as described above. Current Length of Stay: 2 day(s)    Current Patient Status: Inpatient   Certification Statement: The patient will continue to require additional inpatient hospital stay due to need for IV antibiotics. trend procal, repeat labs in am    Discharge Plan: Patient is from the [facility] assisted living facility. Plans for patient to return there once stable for discharge. Tentative discharge next 24-48 hours. Code Status: Level 3 - DNAR and DNI      Subjective:   Pleasant, denies pain or discomfort. States he is feeling better today.     Objective:     Vitals:   Temp (24hrs), Av °F (36.7 °C), Min:97.7 °F (36.5 °C), Max:98.4 °F (36.9 °C)    Temp:  [97.7 °F (36.5 °C)-98.4 °F (36.9 °C)] 97.7 °F (36.5 °C)  HR:  [61-81] 81  Resp:  [16-18] 18  BP: (107-109)/(48-54) 108/48  SpO2:  [95 %-98 %] 97 %  Body mass index is 17.34 kg/m². Input and Output Summary (last 24 hours): Intake/Output Summary (Last 24 hours) at 7/12/2023 1416  Last data filed at 7/12/2023 1224  Gross per 24 hour   Intake 466 ml   Output 1050 ml   Net -584 ml       Physical Exam:     Physical Exam  Vitals and nursing note reviewed. Constitutional:       General: He is not in acute distress. Appearance: Normal appearance. He is not ill-appearing. HENT:      Head: Normocephalic and atraumatic. Nose: Nose normal.      Mouth/Throat:      Mouth: Mucous membranes are dry. Cardiovascular:      Rate and Rhythm: Normal rate and regular rhythm. Pulses: Normal pulses. Heart sounds: Normal heart sounds. Pulmonary:      Effort: Pulmonary effort is normal. No respiratory distress. Breath sounds: Normal breath sounds. No wheezing or rales. Comments: Nonlabored respirations on room air, decreased breath sounds bilateral bases, no cough or shortness of breath  Abdominal:      General: Bowel sounds are normal. There is no distension. Palpations: Abdomen is soft. Tenderness: There is no abdominal tenderness. There is no guarding. Musculoskeletal:         General: Normal range of motion. Skin:     General: Skin is warm and dry. Capillary Refill: Capillary refill takes less than 2 seconds. Neurological:      General: No focal deficit present. Mental Status: He is alert. Mental status is at baseline.       Comments: Gait not assessed at this time   Psychiatric:         Mood and Affect: Mood normal.         Behavior: Behavior normal.         Additional Data:     Labs:    Results from last 7 days   Lab Units 07/12/23  0444   WBC Thousand/uL 16.16*   HEMOGLOBIN g/dL 11.1*   HEMATOCRIT % 34.5*   PLATELETS Thousands/uL 234   NEUTROS PCT % 84*   LYMPHS PCT % 8*   MONOS PCT % 7   EOS PCT % 0     Results from last 7 days   Lab Units 07/12/23  0444 07/11/23  0636 07/10/23  2108   POTASSIUM mmol/L 4.2   < > 5.4*   CHLORIDE mmol/L 108   < > 102   CO2 mmol/L 21   < > 17*   BUN mg/dL 28*   < > 36*   CREATININE mg/dL 1.20   < > 1.35*   CALCIUM mg/dL 8.5   < > 10.1   ALK PHOS U/L  --   --  65   ALT U/L  --   --  21   AST U/L  --   --  25    < > = values in this interval not displayed. Results from last 7 days   Lab Units 07/10/23  2149   INR  1.07       * I Have Reviewed All Lab Data Listed Above. * Additional Pertinent Lab Tests Reviewed: [address] Admission Reviewed    Imaging:    Imaging Reports Reviewed Today Include: CT abdomen pelvis  Imaging Personally Reviewed by Myself Includes: CT abdomen pelvis    Recent Cultures (last 7 days):     Results from last 7 days   Lab Units 07/11/23  0114 07/10/23  2159 07/10/23  2149   BLOOD CULTURE   --  No Growth at 24 hrs. No Growth at 24 hrs.    LEGIONELLA URINARY ANTIGEN  Negative  --   --        Last 24 Hours Medication List:   Current Facility-Administered Medications   Medication Dose Route Frequency Provider Last Rate   • acetaminophen  650 mg Oral Q4H PRN Miguel Herndon PA-C     • aspirin  81 mg Oral Daily Miguel Herndon PA-C     • benzonatate  100 mg Oral TID PRN AMANDA Hines     • buPROPion  150 mg Oral Daily Miguel Herndon PA-C     • Diclofenac Sodium  2 g Topical 4x Daily Miguel Herndon PA-C     • heparin (porcine)  5,000 Units Subcutaneous Q12H [address] Miguel Herndon PA-C     • levalbuterol  1.25 mg Nebulization TID Nicole Alberto MD     • LORazepam  0.5 mg Oral Q6H PRN Miguel Herndon PA-C     • ondansetron  4 mg Intravenous Q6H PRN Miguel MONTRELL Herndon     • pantoprazole  40 mg Oral Daily Miguel MONTRELL Herndon     • piperacillin-tazobactam  2.25 g Intravenous Q6H Miguel Herndon PA-C 0 g (07/12/23 0020)   • QUEtiapine  25 mg Oral BID Charisse Pillar Salvatore Pereira PA-C          Today, Patient Was Seen By: AMANDA Arteaga    ** Please Note: Dictation voice to text software may have been used in the creation of this document.  **

## 2023-07-12 NOTE — UTILIZATION REVIEW
Initial Clinical Review    Admission: Date/Time/Statement:   Admission Orders (From admission, onward)     Ordered        07/10/23 2348  INPATIENT ADMISSION  Once                      Orders Placed This Encounter   Procedures   • INPATIENT ADMISSION     Standing Status:   Standing     Number of Occurrences:   1     Order Specific Question:   Level of Care     Answer:   Med Surg [16]     Order Specific Question:   Estimated length of stay     Answer:   More than 2 Midnights     Order Specific Question:   Certification     Answer:   I certify that inpatient services are medically necessary for this patient for a duration of greater than two midnights. See H&P and MD Progress Notes for additional information about the patient's course of treatment. ED Arrival Information     Expected   -    Arrival   7/10/2023 20:50    Acuity   Urgent            Means of arrival   Ambulance    Escorted by   [address] [place] [address].    Admission type   Emergency            Arrival complaint   vomiting           Chief Complaint   Patient presents with   • Vomiting     [place] / all day. From dementia unit       Initial Presentation: 81 y.o. male to the ED from nursing home via EMS with complaints of vomiting, nausea. Admitted to inpatient for sepsis, pneumonia due to infectious organism, nodule right lung. H/O anxiety, depression, HTN, HLD, Dementia. Arrives confused, tachycardic, paler. WBCs 27.22. CT a/p shows bibasilar groundglass opacities. CT head WNL. He has generalized abdominal pain. Give iv zofran, nebulizer, iv abx started in the ED. Given IV fluid bolus x 2 liters. Lactic acid 4.0. Blood cultures pending. CT a shows possible enteritis. Keep NPO. GI consult. Date: 7/11   Day 2: Continue IV abx. GI consult: CT scan is consistent with mild enteritis. He has no evidence of overt GI bleeding or drop in hemoglobin.  Symptoms likely Neonatology Delivery Note/[place] History and Physical   Baby Boy Krishna Ceasar) Kate 1 days male MRN: [id number]  Unit/Bed#: (N) Encounter: [id number]      Maternal Information     ATTENDING PROVIDER:  Kati Gomez MD    DELIVERY PROVIDER:  Kiley Huddleston MD    Maternal History  History of Present Illness   HPI:  Baby Boy (Alejo [place]) Kate is a 3780 g (8 lb 5 3 oz) product at Gestational Age: 36w3d born to a 35 y o   Loyde Och  mother with Estimated Date of Delivery: 20      PTA medications:   Medications Prior to Admission   Medication    aspirin (ECOTRIN LOW STRENGTH) 81 mg EC tablet    Cholecalciferol (VITAMIN D3) 50 MCG (2000 UT) TABS    Evening Primrose Oil 500 MG CAPS    Prenatal MV-Min-Fe Fum-FA-DHA (PRENATAL MULTIVITAMIN PLUS DHA) 27-0 8-250 MG CAPS    Probiotic Product (PROBIOTIC ADVANCED PO)        Prenatal Labs  Lab Results   Component Value Date/Time    Chlamydia trachomatis, DNA Probe Negative 2020 09:28 AM    N gonorrhoeae, DNA Probe Negative 2020 09:28 AM    ABO Grouping O 2020 01:10 AM    Rh Factor Positive 2020 01:10 AM    Hepatitis B Surface Ag Non-reactive 2019 12:57 PM    RPR NON-REACTIVE 2020 07:36 AM    RPR Non-Reactive 2019 12:57 PM    Rubella IgG Quant 5 0 (L) 2019 12:57 PM    HIV-1/HIV-2 Ab Non-Reactive 2019 12:57 PM    Glucose 101 2020 07:36 AM      Externally resulted Prenatal labs  No results found for: EXTCHLAMYDIA, GLUTA, LABGLUC, [id number], EXTRUBELIGGQ     GBS: Negative  GBS Prophylaxis: negative  OB Suspicion of [place]: no  Maternal antibiotics: none  Diabetes: negative  Herpes: unknown  Prenatal U/S: normal  Prenatal care: good  Family History: non-contributory    Pregnancy complications:none  Fetal complications: none  Maternal medical history and medications: none    Maternal social history: negative  Delivery Summary   Labor was:     Tocolytics: None   Steroid:    Other medications: None    ROM Date: 2020  ROM Time: 12:30 PM  Length of ROM: 33h 17m                Fluid Color: Meconium    Additional  information:  Forceps:   No [0]   Vacuum:   No [0]   Number of pop offs: None   Presentation: Vertex       Anesthesia: Epidural  Cord Complications: None  Nuchal Cord #:   n/a  Nuchal Cord Description:   n/a  Delayed Cord Clamping: No    Birth information:  YOB: 2019   Time of birth: 9:47 PM   Sex: male   Delivery type: Vaginal, Spontaneous   Gestational Age: 36w3d           APGARS  One minute Five minutes Ten minutes   Heart rate: 2 2 2 2     Respiratory Effort: 2 2 2 2     Muscle tone: 1 2 1  2     Reflex Irritability: 2  2 2  2       Skin color: 0 0 1  1      Totals: 7 8 8 9         Neonatologist Note   I was called the Delivery Room for the birth of Baby Dayne Love  My presence requested was due to meconium-stained AF by [facility] Provider   interventions: dried, warmed and stimulated  Infant response to intervention: appropriate  Called back to eval baby at ~20 MOL due to retractions and SaO2 in low 90s  On arrival, baby was no longer retracting  RR wnl  SaO2 94-95%  Deep suctioned gastric contents x3 with large amount of meconium  Sats improved to high 90s  Baby returned to mom for STS and to complete transition       Vitamin K given:   Recent administrations for PHYTONADIONE 1 MG/0 5ML IJ SOLN:    2020 0025         Erythromycin given:   Recent administrations for ERYTHROMYCIN 5 MG/GM OP OINT:    2020 0026         Meds/Allergies   None    KP [place] Sepsis Calculator:  Gestational Age: 36w3d   Mom's Highest Temp: Temp (48hrs), Av 2 °F (37 3 °C), Min:97 9 °F (36 6 °C), Max:100 4 °F (38 °C)   Length of ROM: 33h 17m   Mom's GBS Status: Negative for Beta Hemolytic Strep Grp B by PCR   Date/Time of Delivery: 2020 9:47 PM  Intrapartum Antibiotics:   Antibiotics Administration (last 48 hours)     None         KP Recommendations:  VS q4h  If equivocal, will need secondary to gastroenteritis. Continue PPI. PUlmonar consult:  RUL nodules does not appear to be new. Continue nebulizers. Aspiration precautions. Speech eval: REcommend mechanical soft solid diet with thin liquids. ED Triage Vitals   Temperature Pulse Respirations Blood Pressure SpO2   07/10/23 2107 07/10/23 2107 07/10/23 2107 07/10/23 2107 07/10/23 2107   (!) 97 °F (36.1 °C) 104 18 130/77 97 %      Temp Source Heart Rate Source Patient Position - Orthostatic VS BP Location FiO2 (%)   07/11/23 0839 07/10/23 2107 07/10/23 2107 07/10/23 2107 --   Oral Monitor Lying Left arm       Pain Score       07/11/23 0850       No Pain          Wt Readings from Last 1 Encounters:   07/11/23 58 kg (127 lb 13.9 oz)     Additional Vital Signs:   Date/Time Temp Pulse Resp BP MAP (mmHg) SpO2 O2 Device Patient Position - Orthostatic VS   07/11/23 1944 -- 72 16 -- -- 96 % None (Room air) --   07/11/23 14:39:36 98 °F (36.7 °C) 65 18 109/54 72 98 % -- --   07/11/23 1300 -- -- -- -- -- -- None (Room air) --   07/11/23 1041 -- 66 16 100/52 73 93 % -- --   07/11/23 0839 98.3 °F (36.8 °C) 65 16 99/57 -- 94 % None (Room air) Lying   07/11/23 0630 -- 85 16 120/58 84 95 % -- --   07/11/23 0500 -- 85 -- 102/59 78 94 % -- --   07/11/23 0329 -- 84 16 -- -- 93 % None (Room air) --   07/11/23 0130 -- 90 -- 126/58 83 93 % -- --   07/11/23 0100 -- 88 -- 120/65 85 94 % -- --   07/11/23 0030 -- 92 15 134/100 113 95 % -- --   07/10/23 2330 -- 103 19 129/59 80 94 % -- --   07/10/23 2245 -- 103 -- -- -- -- -- --   07/10/23 2107 97 °F (36.1 °C) Abnormal  104 18 130/77 -- 97 % None (Room air) Lying       Pertinent Labs/Diagnostic Test Results:   7/10 EKG: Sinus tachycardia  First degree AV block  Left anterior fascicular block  Abnormal ECG  When compared with ECG of 07-MAY-2023 03:47,  Vent. rate has increased BY  47 BPM     CT abdomen pelvis with contrast   Final Result by Kristyn Jefferson DO (07/10 5951)      CT findings may suggest enteritis. Patchy bibasilar groundglass opacities, nonspecific. Correlate for atypical/viral infection or aspiration. Subacute left ischial tuberosity fracture. Additional nonacute findings, as described      The study was marked in EPIC for immediate notification. Workstation performed: [id number]         CT head without contrast   Final Result by Alexus Nye DO (07/10 2249)      No acute intracranial abnormality. Chronic microangiopathic changes.                   Workstation performed: [id number]               Results from last 7 days   Lab Units 07/11/23  0636 07/10/23  2108   WBC Thousand/uL 22.68* 27.22*   HEMOGLOBIN g/dL 11.2* 14.9   HEMATOCRIT % 34.3* 47.4   PLATELETS Thousands/uL 236 334   NEUTROS ABS Thousands/µL  --   --    BANDS PCT %  --  2         Results from last 7 days   Lab Units 07/11/23  0636 07/10/23  2108   SODIUM mmol/L 135 131*   POTASSIUM mmol/L 4.6 5.4*   CHLORIDE mmol/L 106 102   CO2 mmol/L 18* 17*   ANION GAP mmol/L 11 12   BUN mg/dL 32* 36*   CREATININE mg/dL 1.11 1.35*   EGFR ml/min/1.73sq m 59 46   CALCIUM mg/dL 8.1* 10.1   MAGNESIUM mg/dL  --   --    PHOSPHORUS mg/dL  --   --      Results from last 7 days   Lab Units 07/10/23  2108   AST U/L 25   ALT U/L 21   ALK PHOS U/L 65   TOTAL PROTEIN g/dL 8.8*   ALBUMIN g/dL 4.0   TOTAL BILIRUBIN mg/dL 0.42         Results from last 7 days   Lab Units 07/11/23 0636 07/10/23  2108   GLUCOSE RANDOM mg/dL 93 107         Results from last 7 days   Lab Units 07/11/23 0114 07/10/23  2108   HS TNI 0HR ng/L  --  12   HS TNI 2HR ng/L 16  --    HSTNI D2 ng/L 4  --          Results from last 7 days   Lab Units 07/10/23  2149   PROTIME seconds 13.9   INR  1.07   PTT seconds 26         Results from last 7 days   Lab Units 07/11/23 0636 07/10/23  2149   PROCALCITONIN ng/ml 0.25 0.10     Results from last 7 days   Lab Units 07/11/23  0114 07/10/23  2149   LACTIC ACID mmol/L 2.7* 4.0*       Results from last 7 days   Lab Units 07/10/23  2103   LIPASE blood culture and empiric antibiotics  Objective   Vitals:   Temperature: 97 8 °F (36 6 °C)  Pulse: 138  Respirations: 50  Length: 21" (53 3 cm)(Filed from Delivery Summary)  Weight: 3780 g (8 lb 5 3 oz)    Physical Exam:   General Appearance:  Alert, active, no distress  Head:  Normocephalic, AFOF, large amount of molding, sutures overriding         Eyes:  Conjunctiva clear  Ears:  Normally placed, no anomalies  Nose: nares patent                           Mouth:  Palate intact  Respiratory:  No grunting, flaring, retractions, breath sounds clear and equal    Cardiovascular:  Regular rate and rhythm  No murmur  Adequate perfusion/capillary refill  Femoral pulse present  Abdomen:   Soft, non-distended, no masses, bowel sounds present, no HSM  Genitourinary:  Normal genitalia  Spine:  No hair kim, dimples  Musculoskeletal:  Normal hips  Skin/Hair/Nails:   Skin warm, dry, and intact, no rashes               Neurologic:   Normal tone and reflexes    Assessment/Plan     Assessment:  Well     Plan:  Routine care  Hearing screen, CCHD, [place] screen, bili check per protocol and Hep B vaccine after parental consent prior to d/c  At risk for EOS given prolonged ROM and maternal low-grade fever, will check q4h VS and if equivocal, will obtain blood culture and start empiric antibiotics      Electronically signed by Uday Blandon DO 2020 7:49 AM u/L 29     Results from last 7 days   Lab Units 07/11/23  0114   STREP PNEUMONIAE ANTIGEN, URINE  Negative   LEGIONELLA URINARY ANTIGEN  Negative         Results from last 7 days   Lab Units 07/10/23  2159 07/10/23  2149   BLOOD CULTURE  No Growth at 24 hrs. No Growth at 24 hrs.        ED Treatment:   Medication Administration from 07/10/2023 2050 to 07/11/2023 1412       Date/Time Order Dose Route Action Comments     07/10/2023 2235 EDT piperacillin-tazobactam (ZOSYN) IVPB 3.375 g 3.375 g Intravenous New Bag --     07/11/2023 0000 EDT multi-electrolyte (PLASMALYTE-A/ISOLYTE-S PH 7.4) IV solution 1,000 mL 1,000 mL Intravenous New Bag --     07/11/2023 0116 EDT multi-electrolyte (PLASMALYTE-A/ISOLYTE-S PH 7.4) IV solution 1,000 mL 1,000 mL Intravenous New Bag --     07/11/2023 1040 EDT piperacillin-tazobactam (ZOSYN) IVPB 2.25 g 2.25 g Intravenous New Bag --     07/11/2023 0454 EDT piperacillin-tazobactam (ZOSYN) IVPB 2.25 g 2.25 g Intravenous New Bag no barcode,m mixed by Supervisor     07/11/2023 8198 EDT heparin (porcine) subcutaneous injection 5,000 Units 5,000 Units Subcutaneous Given --     07/11/2023 0841 EDT aspirin (ECOTRIN LOW STRENGTH) EC tablet 81 mg 81 mg Oral Given --     07/11/2023 0841 EDT buPROPion (WELLBUTRIN XL) 24 hr tablet 150 mg 150 mg Oral Given --     07/11/2023 0119 EDT LORazepam (ATIVAN) tablet 0.5 mg 0.5 mg Oral Given --     07/11/2023 0842 EDT pantoprazole (PROTONIX) EC tablet 40 mg 40 mg Oral Given --     07/11/2023 0840 EDT QUEtiapine (SEROquel) tablet 25 mg 25 mg Oral Given --        Past Medical History:   Diagnosis Date   • Anemia    • Anxiety    • Dementia ([address])    • Depression    • Hyperlipidemia    • Hypertension          Admitting Diagnosis: Lactic acidosis [E87.20]  Enteritis [K52.9]  Vomiting [R11.10]  Abdominal pain [R10.9]  Sepsis ([address]) [A41.9]  Pneumonia of both lower lobes due to infectious organism [J18.9]  Age/Sex: 81 y.o. male  Admission Orders:  SCds  Up and OOB    Scheduled Medications:  aspirin, 81 mg, Oral, Daily  buPROPion, 150 mg, Oral, Daily  Diclofenac Sodium, 2 g, Topical, 4x Daily  heparin (porcine), 5,000 Units, Subcutaneous, Q12H TIFFANIE  levalbuterol, 1.25 mg, Nebulization, TID  pantoprazole, 40 mg, Oral, Daily  piperacillin-tazobactam, 2.25 g, Intravenous, Q6H  QUEtiapine, 25 mg, Oral, BID      Continuous IV Infusions:     PRN Meds:  acetaminophen, 650 mg, Oral, Q4H PRN  LORazepam, 0.5 mg, Oral, Q6H PRN  ondansetron, 4 mg, Intravenous, Q6H PRN        IP CONSULT TO GASTROENTEROLOGY  IP CONSULT TO PULMONOLOGY    Network Utilization Review Department  ATTENTION: Please call with any questions or concerns to [phone] and carefully listen to the prompts so that you are directed to the right person. All voicemails are confidential.  Samina Peck all requests for admission clinical reviews, approved or denied determinations and any other requests to dedicated fax number below belonging to the campus where the patient is receiving treatment.  List of dedicated fax numbers for the Facilities:  [place] DENIALS (Administrative/Medical Necessity) [phone] [address] (Maternity/NICU/Pediatrics) [phone]   [address] [place] [address] [phone]   [facility] [address] [facility] [phone]   [address] [place] [address] [facility] [phone]   [address] [address] [place] [address] [phone]

## 2023-07-12 NOTE — CASE MANAGEMENT
Case Management Discharge Planning Note    Patient name Aakash Martinez  Location /-71 MRN [id number]  : 1935 Date 2023       Current Admission Date: 7/10/2023  Current Admission Diagnosis:Sepsis [facility])   Patient Active Problem List    Diagnosis Date Noted   • Hyponatremia 2023   • Nodule of right lung 07/10/2023   • Sepsis ([address]) 07/10/2023   • Pneumonia due to infectious organism 07/10/2023      LOS (days): 2  Geometric Mean LOS (GMLOS) (days): 5.00  Days to GMLOS:3.4     OBJECTIVE:  Risk of Unplanned Readmission Score: 21.82         Current admission status: Inpatient   Preferred Pharmacy:   [address] [place] [address] [address]  [address] [place] [address]  Phone: [phone] Fax: [phone]    Primary Care Provider: AMANDA Duffy    Primary Insurance: BLUE CROSS  Secondary Insurance: MEDICARE    DISCHARGE DETAILS:    Discharge planning discussed with[de-identified] Patient and Maribeth Alexandra from the [facility]  Freedom of Choice: Yes      Additional Comments: Paer SLIM AP Annita will be ready for discharge 24-48 hrs. Call to Maribeth Alexandra at [facility] and provided with update.

## 2023-07-12 NOTE — PLAN OF CARE
Problem: Potential for Falls  Goal: Patient will remain free of falls  Description: INTERVENTIONS:  - Educate patient/family on patient safety including physical limitations  - Instruct patient to call for assistance with activity   - Consult OT/PT to assist with strengthening/mobility   - Keep Call bell within reach  - Keep bed low and locked with side rails adjusted as appropriate  - Keep care items and personal belongings within reach  - Initiate and maintain comfort rounds  - Make Fall Risk Sign visible to staff  - Initiate/Maintain  bed alarm  - Apply yellow socks and bracelet for high fall risk patients  - Consider moving patient to room near nurses station  Outcome: Progressing     Problem: Prexisting or High Potential for Compromised Skin Integrity  Goal: Skin integrity is maintained or improved  Description: INTERVENTIONS:  - Identify patients at risk for skin breakdown  - Assess and monitor skin integrity  - Assess and monitor nutrition and hydration status  - Monitor labs   - Assess for incontinence   - Turn and reposition patient  - Assist with mobility/ambulation  - Relieve pressure over bony prominences  - Avoid friction and shearing  - Provide appropriate hygiene as needed including keeping skin clean and dry  - Evaluate need for skin moisturizer/barrier cream  - Collaborate with interdisciplinary team   - Patient/family teaching  - Consider wound care consult   Outcome: Progressing

## 2023-07-12 NOTE — QUICK NOTE
Attempted to contact patients son Angie Pandya on number on chart to update to patients status/treatment plan. No answer. Will reattempt tomorrow.

## 2023-07-13 LAB
ANION GAP SERPL CALCULATED.3IONS-SCNC: 7 MMOL/L
BASOPHILS # BLD AUTO: 0.06 THOUSANDS/ÂΜL (ref 0–0.1)
BASOPHILS NFR BLD AUTO: 1 % (ref 0–1)
BUN SERPL-MCNC: 24 MG/DL (ref 5–25)
CALCIUM SERPL-MCNC: 8.4 MG/DL (ref 8.4–10.2)
CHLORIDE SERPL-SCNC: 111 MMOL/L (ref 96–108)
CO2 SERPL-SCNC: 20 MMOL/L (ref 21–32)
CREAT SERPL-MCNC: 1.2 MG/DL (ref 0.6–1.3)
DME PARACHUTE DELIVERY DATE REQUESTED: NORMAL
DME PARACHUTE ITEM DESCRIPTION: NORMAL
DME PARACHUTE ORDER STATUS: NORMAL
DME PARACHUTE SUPPLIER NAME: NORMAL
DME PARACHUTE SUPPLIER PHONE: NORMAL
EOSINOPHIL # BLD AUTO: 0 THOUSAND/ÂΜL (ref 0–0.61)
EOSINOPHIL NFR BLD AUTO: 0 % (ref 0–6)
ERYTHROCYTE [DISTWIDTH] IN BLOOD BY AUTOMATED COUNT: 15.3 % (ref 11.6–15.1)
FLUAV RNA RESP QL NAA+PROBE: NEGATIVE
FLUBV RNA RESP QL NAA+PROBE: NEGATIVE
GFR SERPL CREATININE-BSD FRML MDRD: 54 ML/MIN/1.73SQ M
GLUCOSE SERPL-MCNC: 86 MG/DL (ref 65–140)
HCT VFR BLD AUTO: 34.6 % (ref 36.5–49.3)
HGB BLD-MCNC: 10.8 G/DL (ref 12–17)
IMM GRANULOCYTES # BLD AUTO: 0.12 THOUSAND/UL (ref 0–0.2)
IMM GRANULOCYTES NFR BLD AUTO: 1 % (ref 0–2)
LYMPHOCYTES # BLD AUTO: 1.34 THOUSANDS/ÂΜL (ref 0.6–4.47)
LYMPHOCYTES NFR BLD AUTO: 11 % (ref 14–44)
MCH RBC QN AUTO: 31.2 PG (ref 26.8–34.3)
MCHC RBC AUTO-ENTMCNC: 31.2 G/DL (ref 31.4–37.4)
MCV RBC AUTO: 100 FL (ref 82–98)
MONOCYTES # BLD AUTO: 1.08 THOUSAND/ÂΜL (ref 0.17–1.22)
MONOCYTES NFR BLD AUTO: 9 % (ref 4–12)
MRSA NOSE QL CULT: NORMAL
NEUTROPHILS # BLD AUTO: 9.64 THOUSANDS/ÂΜL (ref 1.85–7.62)
NEUTS SEG NFR BLD AUTO: 78 % (ref 43–75)
NRBC BLD AUTO-RTO: 0 /100 WBCS
PLATELET # BLD AUTO: 242 THOUSANDS/UL (ref 149–390)
PMV BLD AUTO: 9 FL (ref 8.9–12.7)
POTASSIUM SERPL-SCNC: 4.3 MMOL/L (ref 3.5–5.3)
PROCALCITONIN SERPL-MCNC: 0.18 NG/ML
RBC # BLD AUTO: 3.46 MILLION/UL (ref 3.88–5.62)
RSV RNA RESP QL NAA+PROBE: NEGATIVE
SARS-COV-2 RNA RESP QL NAA+PROBE: NEGATIVE
SODIUM SERPL-SCNC: 138 MMOL/L (ref 135–147)
WBC # BLD AUTO: 12.24 THOUSAND/UL (ref 4.31–10.16)

## 2023-07-13 PROCEDURE — 97167 OT EVAL HIGH COMPLEX 60 MIN: CPT

## 2023-07-13 PROCEDURE — 94760 N-INVAS EAR/PLS OXIMETRY 1: CPT

## 2023-07-13 PROCEDURE — 85025 COMPLETE CBC W/AUTO DIFF WBC: CPT

## 2023-07-13 PROCEDURE — 94640 AIRWAY INHALATION TREATMENT: CPT

## 2023-07-13 PROCEDURE — 99233 SBSQ HOSP IP/OBS HIGH 50: CPT

## 2023-07-13 PROCEDURE — 94664 DEMO&/EVAL PT USE INHALER: CPT

## 2023-07-13 PROCEDURE — 84145 PROCALCITONIN (PCT): CPT

## 2023-07-13 PROCEDURE — 92610 EVALUATE SWALLOWING FUNCTION: CPT

## 2023-07-13 PROCEDURE — 97163 PT EVAL HIGH COMPLEX 45 MIN: CPT

## 2023-07-13 PROCEDURE — 80048 BASIC METABOLIC PNL TOTAL CA: CPT

## 2023-07-13 PROCEDURE — 0241U HB NFCT DS VIR RESP RNA 4 TRGT: CPT

## 2023-07-13 RX ADMIN — Medication 2.25 G: at 09:30

## 2023-07-13 RX ADMIN — Medication 2 G: at 21:04

## 2023-07-13 RX ADMIN — PANTOPRAZOLE SODIUM 40 MG: 40 TABLET, DELAYED RELEASE ORAL at 09:31

## 2023-07-13 RX ADMIN — BUPROPION HYDROCHLORIDE 150 MG: 150 TABLET, EXTENDED RELEASE ORAL at 09:31

## 2023-07-13 RX ADMIN — Medication 2 G: at 11:47

## 2023-07-13 RX ADMIN — LEVALBUTEROL HYDROCHLORIDE 1.25 MG: 1.25 SOLUTION RESPIRATORY (INHALATION) at 23:09

## 2023-07-13 RX ADMIN — LEVALBUTEROL HYDROCHLORIDE 1.25 MG: 1.25 SOLUTION RESPIRATORY (INHALATION) at 07:47

## 2023-07-13 RX ADMIN — Medication 2 G: at 09:31

## 2023-07-13 RX ADMIN — QUETIAPINE FUMARATE 25 MG: 25 TABLET ORAL at 17:42

## 2023-07-13 RX ADMIN — HEPARIN SODIUM 5000 UNITS: 5000 INJECTION INTRAVENOUS; SUBCUTANEOUS at 09:31

## 2023-07-13 RX ADMIN — Medication 2.25 G: at 04:34

## 2023-07-13 RX ADMIN — Medication 2 G: at 17:43

## 2023-07-13 RX ADMIN — LEVALBUTEROL HYDROCHLORIDE 1.25 MG: 1.25 SOLUTION RESPIRATORY (INHALATION) at 15:29

## 2023-07-13 RX ADMIN — QUETIAPINE FUMARATE 25 MG: 25 TABLET ORAL at 09:31

## 2023-07-13 RX ADMIN — HEPARIN SODIUM 5000 UNITS: 5000 INJECTION INTRAVENOUS; SUBCUTANEOUS at 21:04

## 2023-07-13 RX ADMIN — ASPIRIN 81 MG: 81 TABLET, COATED ORAL at 09:31

## 2023-07-13 NOTE — ASSESSMENT & PLAN NOTE
· Patient sent from dementia unit secondary to vomiting and abdominal pain  · Met sepsis criteria on arrival with leukocytosis, tachycardia, in setting of possible enteritis on CT scan  · All sepsis criteria have resolved  · 7/10 WBC 27.22, continued to trend downward, now resolved  · 7/10 blood cultures x2 negative to date  · 7/10 CT abdomen pelvis: Suggested enteritis. Also noted patchy bibasilar groundglass opacities, suggest correlate for atypical/viral infection or aspiration   · 7/11 urine for strep pneumonia and Legionella negative  · 7/11 MRSA swab negative  · GI consulted, appreciate recommendations. Felt likely viral gastroenteritis.   · Pulmonology consulted, appreciate recommendations  · Speech therapy consulted, appreciate recommendations  · Patient received IV Zosyn from 7/10 through 7/13 at which time was discontinued so patient could be monitored off of antibiotics  · Patient remains afebrile, no leukocytosis, negative procalcitonin today  · Patient will be discharged to personal care home today, no need for further antibiotics

## 2023-07-13 NOTE — ASSESSMENT & PLAN NOTE
· Present on admission   · 7/10 CT abdomen pelvis: Noted patchy bibasilar groundglass opacities, suggested correlating for atypical/viral infection or aspiration  · Initiated on Zosyn in the ED, continued on floor through 7/13  · Pulmonology consulted, appreciate recommendations  · Speech therapy consulted, appreciate recommendations  · Continue Xopenex treatments on discharge per pulmonary recommendations  · Patient was monitored off of antibiotics since yesterday, remains afebrile, normal procalcitonin this a.m. · Patient will be discharged to [facility] today.   No further antibiotics needed

## 2023-07-13 NOTE — ASSESSMENT & PLAN NOTE
· Present on admission as evidenced by sodium of 131  · Now resolved  · Likely hypovolemic hypotonic hyponatremia in the setting of sepsis  · Patient received IV fluid hydration, hyponatremia resolved

## 2023-07-13 NOTE — PROGRESS NOTES
[address]  Progress Note  Name: Lorne Duvall  MRN: [id number]  Unit/Bed#: -01 I Date of Admission: 7/10/2023   Date of Service: 7/13/2023 I Hospital Day: 3    Assessment/Plan   * Sepsis [facility])  Assessment & Plan  · Patient sent from dementia unit secondary to vomiting and abdominal pain  · Met sepsis criteria on arrival with leukocytosis, tachycardia, in setting of possible enteritis on CT scan  · 7/10 WBC 27.22, continues to trend downward   · 7/10 lactic acid 4.0, received IV fluid resuscitation, repeat 2.7  · 7/10 blood cultures x2 negative to date  · 7/10 CT abdomen pelvis: Suggested enteritis. Also noted patchy bibasilar groundglass opacities, suggest correlate for atypical/viral infection or aspiration   · 7/11 urine for strep pneumonia and Legionella negative  · 7/11 MRSA swab negative  · GI consulted, appreciate recommendations. Felt likely viral gastroenteritis. · Pulmonology consulted, appreciate recommendations  · Speech therapy consulted, appreciate recommendations  · Patient continues on IV Zosyn, will discontinue today. Plan to monitor off of antibiotics in a.m. pending procalcitonin results and patient presentation  · CBC, procalcitonin in a.m. Pneumonia due to infectious organism  Assessment & Plan  · Present on admission   · 7/10 CT abdomen pelvis: Noted patchy bibasilar groundglass opacities, suggested correlating for atypical/viral infection or aspiration  · Initiated on Zosyn in the ED, continued on floor through today  · We will discontinue and monitor off of antibiotics overnight  · Pulmonology consulted, appreciate recommendations  · Speech therapy consulted, appreciate recommendations  · Continue Xopenex treatments  · Tessalon Perles as needed for cough  · CBC, procalcitonin in a.m.   · Planning discharge to [facility] in a.m., case management following made arrangements      Nodule of right lung  Assessment & Plan  · CT: Nonspecific 2.2 x 1.4 cm spiculated nodule in the subpleural region of the right upper lobe noted with surrounding hazy parenchymal opacity. Recommend follow-up CT chest at 3 months, PET/CT, or tissue sampling per current Fleischner Society guidelines. · Incidental finding on previous workup from fall on 6/27. ·  F/u w/ PCP on discharge    Hyponatremia  Assessment & Plan  · Present on admission as evidenced by sodium of 131  · Now resolved  · Likely hypovolemic hypotonic hyponatremia in the setting of sepsis  · Patient received IV fluid hydration, hyponatremia resolved  · BMP a.m. VTE Pharmacologic Prophylaxis:   Pharmacologic: Heparin  Mechanical VTE Prophylaxis in Place: Yes    Patient Centered Rounds: I have performed bedside rounds with nursing staff today. Discussions with Specialists or Other Care Team Provider: pulmonology, GI, nursing, case management. Education and Discussions with Family / Patient: Treatment plan discussed with patient who understands what has been explained and is agreeable to the plan as stated. All questions answered to satisfaction.  updated patient's son Timur Ocampo to plan. I have also updated patient's daughter Reji Brady to plan. All questions answered to her satisfaction. Time Spent for Care: 45 minutes'. More than 50% of total time spent on counseling and coordination of care as described above. Current Length of Stay: 3 day(s)    Current Patient Status: Inpatient   Certification Statement: The patient will continue to require additional inpatient hospital stay due to Monitoring off of antibiotics overnight, labs in AM.    Discharge Plan: Patient is from the [facility] living facility. Monitoring off of antibiotic overnight, repeat CBC procalcitonin in AM.  Planning discharge to personal-care home tomorrow    Code Status: Level 3 - DNAR and DNI      Subjective:   Pleasant, denies pain or discomfort. Anxious to go home.     Objective:     Vitals:   Temp (24hrs), Av.2 °F (36.8 °C), Min:97.9 °F (36.6 °C), Max:98.5 °F (36.9 °C)    Temp:  [97.9 °F (36.6 °C)-98.5 °F (36.9 °C)] 98.5 °F (36.9 °C)  HR:  [66-68] 66  Resp:  [17-18] 18  BP: (124-152)/(59-82) 152/82  SpO2:  [96 %-97 %] 96 %  Body mass index is 17.34 kg/m². Input and Output Summary (last 24 hours): Intake/Output Summary (Last 24 hours) at 2023 1524  Last data filed at 2023 1300  Gross per 24 hour   Intake 670 ml   Output 2000 ml   Net -1330 ml       Physical Exam:     Physical Exam  Vitals and nursing note reviewed. Constitutional:       General: He is not in acute distress. Appearance: Normal appearance. He is not ill-appearing. HENT:      Head: Normocephalic and atraumatic. Nose: Nose normal.      Mouth/Throat:      Mouth: Mucous membranes are dry. Cardiovascular:      Rate and Rhythm: Normal rate and regular rhythm. Pulses: Normal pulses. Heart sounds: Normal heart sounds. Pulmonary:      Effort: Pulmonary effort is normal. No respiratory distress. Breath sounds: Normal breath sounds. No wheezing or rales. Comments: Nonlabored respirations on room air, decreased breath sounds bilateral bases, no cough or shortness of breath  Abdominal:      General: Bowel sounds are normal. There is no distension. Palpations: Abdomen is soft. Tenderness: There is no abdominal tenderness. There is no guarding. Musculoskeletal:         General: Normal range of motion. Skin:     General: Skin is warm and dry. Capillary Refill: Capillary refill takes less than 2 seconds. Neurological:      General: No focal deficit present. Mental Status: He is alert. Mental status is at baseline.       Comments: Gait not assessed at this time   Psychiatric:         Mood and Affect: Mood normal.         Behavior: Behavior normal.         Additional Data:     Labs:    Results from last 7 days   Lab Units 23  0433   WBC Thousand/uL 12.24*   HEMOGLOBIN g/dL 10.8* HEMATOCRIT % 34.6*   PLATELETS Thousands/uL 242   NEUTROS PCT % 78*   LYMPHS PCT % 11*   MONOS PCT % 9   EOS PCT % 0     Results from last 7 days   Lab Units 07/13/23  0433 07/11/23  0636 07/10/23  2108   POTASSIUM mmol/L 4.3   < > 5.4*   CHLORIDE mmol/L 111*   < > 102   CO2 mmol/L 20*   < > 17*   BUN mg/dL 24   < > 36*   CREATININE mg/dL 1.20   < > 1.35*   CALCIUM mg/dL 8.4   < > 10.1   ALK PHOS U/L  --   --  65   ALT U/L  --   --  21   AST U/L  --   --  25    < > = values in this interval not displayed. Results from last 7 days   Lab Units 07/10/23  2149   INR  1.07       * I Have Reviewed All Lab Data Listed Above. * Additional Pertinent Lab Tests Reviewed: [address] Admission Reviewed    Imaging:    Imaging Reports Reviewed Today Include: CT abdomen pelvis  Imaging Personally Reviewed by Myself Includes: CT abdomen pelvis    Recent Cultures (last 7 days):     Results from last 7 days   Lab Units 07/11/23  0114 07/10/23  2159 07/10/23  2149   BLOOD CULTURE   --  No Growth at 48 hrs. No Growth at 48 hrs.    LEGIONELLA URINARY ANTIGEN  Negative  --   --        Last 24 Hours Medication List:   Current Facility-Administered Medications   Medication Dose Route Frequency Provider Last Rate   • acetaminophen  650 mg Oral Q4H PRN Olivia Houston PA-C     • aspirin  81 mg Oral Daily Olivia Houston PA-C     • benzonatate  100 mg Oral TID PRN AMANDA Hines     • buPROPion  150 mg Oral Daily Olivia Houston PA-C     • Diclofenac Sodium  2 g Topical 4x Daily Olivia Houston PA-C     • heparin (porcine)  5,000 Units Subcutaneous Q12H [facility] Olivia Houston PA-C     • levalbuterol  1.25 mg Nebulization TID Mars Hernandez MD     • LORazepam  0.5 mg Oral Q6H PRN Olivia Houston PA-C     • ondansetron  4 mg Intravenous Q6H PRN Olivia Houston PA-C     • pantoprazole  40 mg Oral Daily Olivia Houston PA-C     • QUEtiapine  25 mg Oral BID Manav Molina PA-C          Today, Patient Was Seen By: AMANDA Sanchez    ** Please Note: Dictation voice to text software may have been used in the creation of this document.  **

## 2023-07-13 NOTE — PHYSICAL THERAPY NOTE
Physical Therapy Evaluation   Time in: 0828  Time out: 0843  Total evaluation time: 15 minutes    Patient's Name: Chandler Marrero    Admitting Diagnosis  Lactic acidosis [E87.20]  Enteritis [K52.9]  Vomiting [R11.10]  Abdominal pain [R10.9]  Sepsis ([address]) [A41.9]  Pneumonia of both lower lobes due to infectious organism [J18.9]    Problem List  Patient Active Problem List   Diagnosis    Nodule of right lung    Sepsis ([address])    Pneumonia due to infectious organism    Hyponatremia       Past Medical History  Past Medical History:   Diagnosis Date    Anemia     Anxiety     Dementia ([address])     Depression     Hyperlipidemia     Hypertension        Past Surgical History  History reviewed. No pertinent surgical history. PT performed at least 2 patient identifiers during session: Name and wristband. 07/13/23 0843   PT Last Visit   PT Visit Date 07/13/23   Note Type   Note type Evaluation   Pain Assessment   Pain Assessment Tool 0-10   Pain Score No Pain   Restrictions/Precautions   Weight Bearing Precautions Per Order No   Other Precautions Cognitive; Chair Alarm; Bed Alarm;Multiple lines; Fall Risk;Hard of hearing  (condom cath)   Home Living   Type of Home Assisted living  ([facility])   Home Layout Performs ADLs on one level;Ramped entrance;Elevator   Bathroom Shower/Tub Walk-in shower   Bathroom Toilet Raised   Bathroom Equipment Grab bars in shower; Shower chair;Grab bars around toilet   [address]; Wheelchair-manual  (Pt reports use of both RW and w/c at baseline)   Prior Function   Level of [place] Needs assistance with ADLs; Needs assistance with functional mobility; Needs assistance with [address] staff   Receives Help From Personal care attendant   IADLs Family/Friend/Other provides transportation; Family/Friend/Other provides meals; Family/Friend/Other provides medication management   Falls in the last 6 months 0  ("not that I know of") Vocational Retired   Comments Pt is a questionable historian due to cognition & baseline dementia   General   Family/Caregiver Present No   Cognition   Overall Cognitive Status Impaired   Arousal/Participation Alert   Attention Attends with cues to redirect   Orientation Level Oriented to person;Disoriented to place; Disoriented to time;Disoriented to situation  ("May 2023" "[place]")   Memory Decreased recall of biographical information;Decreased short term memory;Decreased recall of recent events;Decreased recall of precautions   Following Commands Follows one step commands without difficulty   Comments pt agreeable to PT session   Subjective   Subjective "I can try to get up now"   RLE Assessment   RLE Assessment X   Strength RLE   RLE Overall Strength 3+/5   LLE Assessment   LLE Assessment X   Strength LLE   LLE Overall Strength 3+/5   Vision-Basic Assessment   Current Vision Wears glasses all the time   Coordination   Movements are Fluid and Coordinated 0   Sensation WFL   Bed Mobility   Supine to Sit 4  Minimal assistance   Additional items Assist x 1;HOB elevated; Bedrails; Increased time required;Verbal cues   Additional Comments Pt remained OOB in recliner upon conclusion   Transfers   Sit to Stand 4  Minimal assistance   Additional items Assist x 1; Increased time required;Verbal cues   Stand to Sit 4  Minimal assistance   Additional items Assist x 1; Increased time required;Verbal cues   Stand pivot   (CGA)   Additional items Assist x 1; Increased time required;Verbal cues  (cues for RW management)   Ambulation/Elevation   Gait pattern Improper Weight shift;Narrow JOSÉ MIGUEL; Decreased foot clearance; Step to;Excessively slow   Gait Assistance 4  Minimal assist   Additional items Assist x 1;Verbal cues; Tactile cues   Assistive Device Rolling walker   Distance 3 ft from bed>recliner   Stair Management Assistance Not tested   Balance   Static Sitting Good   Dynamic Sitting Fair +   Static Standing Fair -   Dynamic Standing Fair -   Ambulatory Fair -   Endurance Deficit   Endurance Deficit Yes   Activity Tolerance   Activity Tolerance Patient limited by fatigue; Other (Comment)  (cognition)   Medical Staff Made Aware coordination of care provided with OT [address] Yes, RN made aware of outcomes/recs   Assessment   Prognosis Good   Problem List Decreased strength;Decreased endurance; Impaired balance;Decreased mobility; Decreased cognition; Impaired judgement;Decreased safety awareness   Assessment Pt is 81 y.o. male seen for high-complexity PT evaluation on 7/13/2023 s/p admit [address] on 7/10/2023 w/ Sepsis ([address] PT was consulted to assess pt's functional mobility and d/c needs. Order placed for PT eval and tx, w/ up and OOB as tolerated and out of bed to chair order. PTA, pt resides at [facility], uses RW vs w/c at baseline, assist for ADLs/IADLs provided. At time of eval, pt requires min Ax1 for bed mob & transfers c CGx1 for SPT from bed>recliner with RW. Upon evaluation, pt presenting with impaired functional mobility d/t decreased strength, decreased endurance, impaired balance, decreased mobility, decreased cognition, impaired judgement and decreased safety awareness. Pertinent PMHx and current co-morbidities affecting pt's physical performance at time of assessment include: dementia, anxiety, depression, HLD, HTN, hyponatremia. Personal factors affecting pt at time of eval include: decreased cognition, decreased initiation and engagement and limited insight into impairments. The following objective measures performed on IE also reveal limitations: AM-PAC 6-Clicks: 52/73. Pt's clinical presentation is currently unstable/unpredictable seen in pt's presentation of abnormal lab value(s), need for input for task focus and mobility technique and ongoing medical assessment.  Overall, pt's rehab potential and prognosis to return to PLOF is good as impacted by objective findings, warranting pt to receive further skilled PT interventions to address identified impairments, activity limitation(s), and participation restriction(s). Goal for patient is to go home today. Pt to benefit from continued PT tx to address deficits as defined above and maximize level of functional independent mobility and consistency in order for pt to improve mobility safety. From PT/mobility standpoint, recommendation at time of d/c would be return to facility with rehabilitation services pending progress in order to facilitate return to OF. Barriers to Discharge None   Goals   Patient Goals "to go home today"   [facility] Expiration Date 07/23/23   Short Term Goal #1 In 7-10 days: Increase bilateral LE strength 1/2 grade to facilitate independent mobility, Perform all bed mobility tasks with close S to decrease caregiver burden, Perform all transfers with close S to improve independence, Ambulate > 10 ft. with least restrictive assistive device with close S w/o LOB and w/ normalized gait pattern 100% of the time, Increase all balance 1/2 grade to decrease risk for falls and Tolerate 4 hr OOB to faciliate upright tolerance   PT Treatment Day 0   Plan   Treatment/Interventions Functional transfer training;LE strengthening/ROM; Therapeutic exercise; Endurance training;Cognitive reorientation;Patient/family training;Equipment eval/education; Bed mobility;Gait training;Spoke to nursing;Spoke to case management   PT Frequency 2-3x/wk   Recommendation   PT Discharge Recommendation Return to facility with rehabilitation services   Additional Comments Pt's raw score on the [facility] Basic Mobility inpatient short form is 15, standardized score is 36.97. Patients at this level are likely to benefit from DC to [address] [place] [address] Drive, however, please refer to therapist recommendation for safe DC planning.    [facility] Basic Mobility Inpatient   Turning in Flat Bed Without Bedrails 3   Lying on Back to Sitting on Edge of Flat Bed Without Bedrails 3   Moving Bed to Chair 3 Standing Up From Chair Using Arms 3   Walk in Room 2   Climb 3-5 Stairs With Railing 1   Basic Mobility Inpatient Raw Score 15   Basic Mobility Standardized Score 36.97   Highest Level Of Mobility   -HLM Goal 4: Move to chair/commode   -HLM Achieved 4: Move to chair/commode   End of Consult   Patient Position at End of Consult Bedside chair;Bed/Chair alarm activated; All needs within reach       Isaiah Harris, PT, DPT

## 2023-07-13 NOTE — PLAN OF CARE
Problem: Prexisting or High Potential for Compromised Skin Integrity  Goal: Skin integrity is maintained or improved  Description: INTERVENTIONS:  - Identify patients at risk for skin breakdown  - Assess and monitor skin integrity  - Assess and monitor nutrition and hydration status  - Monitor labs   - Assess for incontinence   - Turn and reposition patient  - Assist with mobility/ambulation  - Relieve pressure over bony prominences  - Avoid friction and shearing  - Provide appropriate hygiene as needed including keeping skin clean and dry  - Evaluate need for skin moisturizer/barrier cream  - Collaborate with interdisciplinary team   - Patient/family teaching  - Consider wound care consult   Outcome: Progressing     Problem: Potential for Falls  Goal: Patient will remain free of falls  Description: INTERVENTIONS:  - Educate patient/family on patient safety including physical limitations  - Instruct patient to call for assistance with activity   - Consult OT/PT to assist with strengthening/mobility   - Keep Call bell within reach  - Keep bed low and locked with side rails adjusted as appropriate  - Keep care items and personal belongings within reach  - Initiate and maintain comfort rounds  - Make Fall Risk Sign visible to staff  - Offer Toileting every  Hours, in advance of need  - Initiate/Maintain alarm  - Obtain necessary fall risk management equipment:   - Apply yellow socks and bracelet for high fall risk patients  - Consider moving patient to room near nurses station  Outcome: Progressing     Problem: MOBILITY - ADULT  Goal: Maintain or return to baseline ADL function  Description: INTERVENTIONS:  -  Assess patient's ability to carry out ADLs; assess patient's baseline for ADL function and identify physical deficits which impact ability to perform ADLs (bathing, care of mouth/teeth, toileting, grooming, dressing, etc.)  - Assess/evaluate cause of self-care deficits   - Assess range of motion  - Assess patient's mobility; develop plan if impaired  - Assess patient's need for assistive devices and provide as appropriate  - Encourage maximum independence but intervene and supervise when necessary  - Involve family in performance of ADLs  - Assess for home care needs following discharge   - Consider OT consult to assist with ADL evaluation and planning for discharge  - Provide patient education as appropriate  Outcome: Progressing  Goal: Maintains/Returns to pre admission functional level  Description: INTERVENTIONS:  - Perform BMAT or MOVE assessment daily.   - Set and communicate daily mobility goal to care team and patient/family/caregiver. - Collaborate with rehabilitation services on mobility goals if consulted  - Perform Range of Motion  times a day. - Reposition patient every  hours. - Dangle patient  times a day  - Stand patient  times a day  - Ambulate patient  times a day  - Out of bed to chair  times a day   - Out of bed for meals  times a day  - Out of bed for toileting  - Record patient progress and toleration of activity level   Outcome: Progressing     Problem: Nutrition/Hydration-ADULT  Goal: Nutrient/Hydration intake appropriate for improving, restoring or maintaining nutritional needs  Description: Monitor and assess patient's nutrition/hydration status for malnutrition. Collaborate with interdisciplinary team and initiate plan and interventions as ordered. Monitor patient's weight and dietary intake as ordered or per policy. Utilize nutrition screening tool and intervene as necessary. Determine patient's food preferences and provide high-protein, high-caloric foods as appropriate.      INTERVENTIONS:  - Monitor oral intake, urinary output, labs, and treatment plans  - Assess nutrition and hydration status and recommend course of action  - Evaluate amount of meals eaten  - Assist patient with eating if necessary   - Allow adequate time for meals  - Recommend/ encourage appropriate diets, oral nutritional supplements, and vitamin/mineral supplements  - Order, calculate, and assess calorie counts as needed  - Recommend, monitor, and adjust tube feedings and TPN/PPN based on assessed needs  - Assess need for intravenous fluids  - Provide specific nutrition/hydration education as appropriate  - Include patient/family/caregiver in decisions related to nutrition  Outcome: Progressing     Problem: INFECTION - ADULT  Goal: Absence or prevention of progression during hospitalization  Description: INTERVENTIONS:  - Assess and monitor for signs and symptoms of infection  - Monitor lab/diagnostic results  - Monitor all insertion sites, i.e. indwelling lines, tubes, and drains  - Monitor endotracheal if appropriate and nasal secretions for changes in amount and color  - [place] appropriate cooling/warming therapies per order  - Administer medications as ordered  - Instruct and encourage patient and family to use good hand hygiene technique  - Identify and instruct in appropriate isolation precautions for identified infection/condition  Outcome: Progressing  Goal: Absence of fever/infection during neutropenic period  Description: INTERVENTIONS:  - Monitor WBC    Outcome: Progressing     Problem: NEUROSENSORY - ADULT  Goal: Achieves stable or improved neurological status  Description: INTERVENTIONS  - Monitor and report changes in neurological status  - Monitor vital signs such as temperature, blood pressure, glucose, and any other labs ordered   - Initiate measures to prevent increased intracranial pressure  - Monitor for seizure activity and implement precautions if appropriate      Outcome: Progressing     Problem: GASTROINTESTINAL - ADULT  Goal: Maintains adequate nutritional intake  Description: INTERVENTIONS:  - Monitor percentage of each meal consumed  - Identify factors contributing to decreased intake, treat as appropriate  - Assist with meals as needed  - Monitor I&O, weight, and lab values if indicated  - Obtain nutrition services referral as needed  Outcome: Progressing     Problem: METABOLIC, FLUID AND ELECTROLYTES - ADULT  Goal: Electrolytes maintained within normal limits  Description: INTERVENTIONS:  - Monitor labs and assess patient for signs and symptoms of electrolyte imbalances  - Administer electrolyte replacement as ordered  - Monitor response to electrolyte replacements, including repeat lab results as appropriate  - Instruct patient on fluid and nutrition as appropriate  Outcome: Progressing  Goal: Fluid balance maintained  Description: INTERVENTIONS:  - Monitor labs   - Monitor I/O and WT  - Instruct patient on fluid and nutrition as appropriate  - Assess for signs & symptoms of volume excess or deficit  Outcome: Progressing  Goal: Glucose maintained within target range  Description: INTERVENTIONS:  - Monitor Blood Glucose as ordered  - Assess for signs and symptoms of hyperglycemia and hypoglycemia  - Administer ordered medications to maintain glucose within target range  - Assess nutritional intake and initiate nutrition service referral as needed  Outcome: Progressing     Problem: SKIN/TISSUE INTEGRITY - ADULT  Goal: Skin Integrity remains intact(Skin Breakdown Prevention)  Description: Assess:  -Perform [place] assessment every   -Clean and moisturize skin every   -Inspect skin when repositioning, toileting, and assisting with ADLS  -Assess under medical devices such as  every   -Assess extremities for adequate circulation and sensation     Bed Management:  -Have minimal linens on bed & keep smooth, unwrinkled  -Change linens as needed when moist or perspiring  -Avoid sitting or lying in one position for more than  hours while in bed  -Keep HOB at degrees     Toileting:  -Offer bedside commode  -Assess for incontinence every   -Use incontinent care products after each incontinent episode such as     Activity:  -Mobilize patient  times a day  -Encourage activity and walks on unit  -Encourage or provide ROM exercises   -Turn and reposition patient every  Hours  -Use appropriate equipment to lift or move patient in bed  -Instruct/ Assist with weight shifting every when out of bed in chair  -Consider limitation of chair time  hour intervals    Skin Care:  -Avoid use of baby powder, tape, friction and shearing, hot water or constrictive clothing  -Relieve pressure over bony prominences using   -Do not massage red bony areas    Next Steps:  -Teach patient strategies to minimize risks such as    -Consider consults to  interdisciplinary teams such as   Outcome: Progressing     Problem: HEMATOLOGIC - ADULT  Goal: Maintains hematologic stability  Description: INTERVENTIONS  - Assess for signs and symptoms of bleeding or hemorrhage  - Monitor labs  - Administer supportive blood products/factors as ordered and appropriate  Outcome: Progressing

## 2023-07-13 NOTE — PLAN OF CARE
Problem: PHYSICAL THERAPY ADULT  Goal: Performs mobility at highest level of function for planned discharge setting. See evaluation for individualized goals. Description: Treatment/Interventions: Functional transfer training, LE strengthening/ROM, Therapeutic exercise, Endurance training, Cognitive reorientation, Patient/family training, Equipment eval/education, Bed mobility, Gait training, Spoke to nursing, Spoke to case management          See flowsheet documentation for full assessment, interventions and recommendations. 7/13/2023 1104 by Juan Diego Garcia, PT  Note: Prognosis: Good  Problem List: Decreased strength, Decreased endurance, Impaired balance, Decreased mobility, Decreased cognition, Impaired judgement, Decreased safety awareness  Assessment: Pt is 81 y.o. male seen for high-complexity PT evaluation on 7/13/2023 s/p admit to [facility] on 7/10/2023 w/ Sepsis ([address] PT was consulted to assess pt's functional mobility and d/c needs. Order placed for PT eval and tx, w/ up and OOB as tolerated and out of bed to chair order. PTA, pt resides at [facility], uses RW vs w/c at baseline, assist for ADLs/IADLs provided. At time of eval, pt requires min Ax1 for bed mob & transfers c CGx1 for SPT from bed>recliner with RW. Upon evaluation, pt presenting with impaired functional mobility d/t decreased strength, decreased endurance, impaired balance, decreased mobility, decreased cognition, impaired judgement and decreased safety awareness. Pertinent PMHx and current co-morbidities affecting pt's physical performance at time of assessment include: dementia, anxiety, depression, HLD, HTN, hyponatremia. Personal factors affecting pt at time of eval include: decreased cognition, decreased initiation and engagement and limited insight into impairments. The following objective measures performed on IE also reveal limitations: AM-PAC 6-Clicks: 09/94.  Pt's clinical presentation is currently unstable/unpredictable seen in pt's presentation of abnormal lab value(s), need for input for task focus and mobility technique and ongoing medical assessment. Overall, pt's rehab potential and prognosis to return to PLOF is good as impacted by objective findings, warranting pt to receive further skilled PT interventions to address identified impairments, activity limitation(s), and participation restriction(s). Goal for patient is to go home today. Pt to benefit from continued PT tx to address deficits as defined above and maximize level of functional independent mobility and consistency in order for pt to improve mobility safety. From PT/mobility standpoint, recommendation at time of d/c would be return to facility with rehabilitation services pending progress in order to facilitate return to PLOF. Barriers to Discharge: None     PT Discharge Recommendation: Return to facility with rehabilitation services    See flowsheet documentation for full assessment.

## 2023-07-13 NOTE — OCCUPATIONAL THERAPY NOTE
Occupational Therapy Evaluation      Tiffanie Cota    7/13/2023    Principal Problem:    Sepsis ([address])  Active Problems:    Nodule of right lung    Pneumonia due to infectious organism    Hyponatremia      Past Medical History:   Diagnosis Date    Anemia     Anxiety     Dementia ([address])     Depression     Hyperlipidemia     Hypertension        History reviewed. No pertinent surgical history. 07/13/23 0828   OT Last Visit   OT Visit Date 07/13/23   Note Type   Note type Evaluation   Pain Assessment   Pain Assessment Tool 0-10   Pain Score No Pain   Restrictions/Precautions   Weight Bearing Precautions Per Order No   Other Precautions Cognitive; Chair Alarm; Bed Alarm;Multiple lines; Fall Risk;Hard of hearing  (condom cath)   Home Living   Type of Home Assisted living  ([place])   Home Layout Multi-level;Performs ADLs on one level;Ramped entrance;Elevator   Bathroom Shower/Tub Walk-in shower   Bathroom Toilet Raised   Bathroom Equipment Grab bars in shower; Shower chair;Grab bars around toilet   [address]; Wheelchair-manual  (Pt reports use of both RW and w/c at baseline)   Prior Function   Level of [place] Needs assistance with ADLs; Needs assistance with functional mobility; Needs assistance with [address] [place] [address] Rd staff   Receives Help From Personal care attendant   Falls in the last 6 months 0  ("not that I know of")   Vocational Retired   Comments Pt is a questionable historian due to cognition   ADL   UB Bathing Assistance 5  Supervision/Setup   LB Bathing Assistance 3  Moderate Assistance   UB Dressing Assistance 4  Minimal Assistance   LB Dressing Assistance 3  Moderate Assistance   Bed Mobility   Supine to Sit 4  Minimal assistance   Additional items Assist x 1;HOB elevated; Bedrails; Increased time required;Verbal cues   Additional Comments Pt remained OOB in recliner upon conclusion   Transfers   Sit to Stand 4  Minimal assistance   Additional items Assist x 1; Increased time required;Verbal cues   Stand to Sit 4  Minimal assistance   Additional items Assist x 1; Armrests; Increased time required;Verbal cues   Stand pivot   (CGA)   Additional items Assist x 1; Increased time required;Verbal cues  (RW)   Balance   Static Sitting Good   Dynamic Sitting Fair +   Static Standing Fair   Dynamic Standing Fair -   Ambulatory Fair -   Activity Tolerance   Activity Tolerance Patient limited by fatigue; Other (Comment)  (cognition)   RUE Assessment   RUE Assessment WFL   LUE Assessment   LUE Assessment WFL   Vision-Basic Assessment   Current Vision Wears glasses all the time   Cognition   Overall Cognitive Status Impaired   Arousal/Participation Alert; Cooperative   Attention Attends with cues to redirect   Orientation Level Oriented to person;Disoriented to place; Disoriented to time;Disoriented to situation  ("May 2023" "[place]")   Memory Decreased recall of biographical information;Decreased short term memory;Decreased recall of recent events;Decreased recall of precautions   Following Commands Follows one step commands without difficulty   Assessment   Limitation Decreased ADL status; Decreased Safe judgement during ADL;Decreased cognition;Decreased endurance;Decreased self-care trans;Decreased high-level ADLs   Prognosis Good   Assessment Pt is a 81 y.o. male seen for OT evaluation s/p admit to [facility] on 7/10/2023 w/ Sepsis ([address] Comorbidities affecting pt's functional performance at time of assessment include: Pneumonia, Hyponatremia. Personal factors affecting pt at time of IE include:difficulty performing ADLS, limited insight into deficits and decreased initiation and engagement . Prior to admission, pt required A with ADLs. Upon evaluation: the following deficits impact occupational performance: decreased balance, decreased tolerance, impaired initiation, impaired memory, impaired sequencing, impaired problem solving and decreased safety awareness.  Pt to benefit from continued skilled OT tx while in the hospital to address deficits as defined above and maximize level of functional independence w ADL's and functional mobility. Occupational Performance areas to address include: bathing/shower, toilet hygiene, dressing, functional mobility and clothing management. From OT standpoint, recommendation at time of d/c would be return to facility with rehab services. Goals   Patient Goals To go home today   Plan   Treatment Interventions ADL retraining;Functional transfer training; Endurance training;Patient/family training; Compensatory technique education; Energy conservation; Activityengagement   Goal Expiration Date 07/23/23   OT Treatment Day 0   OT Frequency 3-5x/wk   Recommendation   OT Discharge Recommendation Return to facility with rehabilitation services   Additional Comments  Pt seen as a co-eval with PT due to the patient's co-morbidities, clinically unstable presentation, and present impairments which are a regression from the patient's baseline. Additional Comments 2 The patient's raw score on the AM-PAC Daily Activity Inpatient Short Form is 15. A raw score of less than 19 suggests the patient may benefit from discharge to post-acute rehabilitation services, however Pt required A with ADLs PTA. Please refer to the recommendation of the Occupational Therapist for safe discharge planning.    AM-PAC Daily Activity Inpatient   Lower Body Dressing 2   Bathing 2   Toileting 2   Upper Body Dressing 3   Grooming 3   Eating 3   Daily Activity Raw Score 15   Daily Activity Standardized Score (Calc for Raw Score >=11) 34.69   AM-PAC Applied Cognition Inpatient   Following a Speech/Presentation 3   Understanding Ordinary Conversation 3   Taking Medications 2   Remembering Where Things Are Placed or Put Away 2   Remembering List of 4-5 Errands 1   Taking Care of Complicated Tasks 1   Applied Cognition Raw Score 12   Applied Cognition Standardized Score 28.82 GOALS:    Pt will achieve the following within specified time frame: STG  Pt will achieve the following goals within 5 days    *ADL transfers with CGA for inc'd independence with ADLs/purposeful tasks    *Self Feeding- (I) for inc'd independence with providing self nourishment    *UB ADL with (S) for inc'd independence with self cares    *LB ADL with Min (A) using AE prn for inc'd independence with self cares    *Toileting with Mod (A) for clothing management and hygiene for return to PLOF with personal care    *Increase static stand balance to F+ and dyn stand balance to F for inc'd safety with standing purposeful tasks    *Increase stand tolerance x3 m for inc'd tolerance with standing purposeful tasks    *Participate in 10m UE therex to increase overall stamina/activity tolerance for purposeful tasks    *Bed mobility- CGA for inc'd independence to manage own comfort and initiate EOB & OOB purposeful tasks    Pt will achieve the following within specified time frame: LTG  Pt will achieve the following goals within 10 days    *ADL transfers with (S) for inc'd independence with ADLs/purposeful tasks    *UB ADL with (I) for inc'd independence with self cares    *LB ADL with CGA using AE prn for inc'd independence with self cares    *Toileting with Min (A) for clothing management and hygiene for return to PLOF with personal care    *Increase static stand balance to G- and dyn stand balance to F+ for inc'd safety with standing purposeful tasks    *Increase stand tolerance x5 m for inc'd tolerance with standing purposeful tasks    *Bed mobility- (S) for inc'd independence to manage own comfort and initiate EOB & OOB purposeful tasks      Tom Angela, MS, OTR/L

## 2023-07-13 NOTE — CASE MANAGEMENT
Case Management Discharge Planning Note    Patient name Kaleb Ramirez  Location /-21 MRN [id number]  : 1935 Date 2023       Current Admission Date: 7/10/2023  Current Admission Diagnosis:Sepsis [facility])   Patient Active Problem List    Diagnosis Date Noted   • Hyponatremia 2023   • Nodule of right lung 07/10/2023   • Sepsis ([address]) 07/10/2023   • Pneumonia due to infectious organism 07/10/2023      LOS (days): 3  Geometric Mean LOS (GMLOS) (days): 5.00  Days to GMLOS:2.4     OBJECTIVE:  Risk of Unplanned Readmission Score: 19.75         Current admission status: Inpatient   Preferred Pharmacy:   [address] [place] [address] [address]  [address] [address]  Phone: [phone] Fax: [phone]    Primary Care Provider: AMANDA Fitzgerald    Primary Insurance: BLUE CROSS  Secondary Insurance: MEDICARE    DISCHARGE DETAILS:    Discharge planning discussed with[de-identified] Patient, Son Sarahy Rose and Mariia Harris from [facility] of Choice: Yes     CM contacted family/caregiver?: Yes  Were Treatment Team discharge recommendations reviewed with patient/caregiver?: Yes  Did patient/caregiver verbalize understanding of patient care needs?: Yes  Were patient/caregiver advised of the risks associated with not following Treatment Team discharge recommendations?: Yes    Contacts  Patient Contacts: Kaleb Ramirez (Son)   [phone] (Mobile)  Relationship to Patient[de-identified] Family  Contact Method: Phone  Phone Number: Kaleb Ramirez (Son)   [phone] (Mobile)  Reason/Outcome: Emergency Contact, Discharge  [facility] [address]         Is the patient interested in [address] at discharge?: Yes  [facility] requested[de-identified] Occupational Therapy, Physical [address] [facility] Name[de-identified] [facility] External Referral Reason (only applicable if external HHA name selected): Patient has established relationship with provider  Home Health Follow-Up Provider[de-identified] PCP  Home Health Services Needed[de-identified] Evaluate Functional Status and Safety, Gait/ADL Training, Strengthening/Theraputic Exercises to Improve Function  Homebound Criteria Met[de-identified] Requires the Assistance of Another Person for Safe Ambulation or to Leave the Home  Supporting Clincal Findings[de-identified] Dyspnea with Exertion, Fatigues Easliy in [employer], Cognitive Deficit Requiring the Assistance of Others    DME Referral Provided  Referral made for DME?: Yes  DME referral completed for the following items[de-identified] Nebulizer    Treatment Team Recommendation: Facility Return  Discharge Destination Plan[de-identified] Facility Return  Transport at Discharge : [facility]  Dispatcher Contacted: Yes  Number/Name of Dispatcher: Via [employer]  Transported by [employer] and Unit #): [place]  ETA of Transport (Date): 07/13/23  ETA of Transport (Time): 1000     Additional Comments: Discharge planning discussed with Reta Maldonado from [facility]. Nebulizer ordered and delivered to patient from consignment. Per Reta Maldonado facility uses [address] rehab. Referral made to [facility] [address] via [employer]. Reta Maldonado can accept patient back tomorrow am. [facility] transportrequested for 7/14 at 10am.  COVID negative. Call to jaycee Kessler and updated same. Update to ELVIRA Ariza and [facility] Name, [address] [place] [address] :  [facility]  Receiving Facility/Agency Phone Number: [phone]  Facility/Agency Fax Number: [phone]
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
## 2023-07-13 NOTE — CASE MANAGEMENT
Case Management Discharge Planning Note    Patient name Michelle Jordan  Location /-64 MRN [id number]  : 1935 Date 2023       Current Admission Date: 7/10/2023  Current Admission Diagnosis:Sepsis [facility])   Patient Active Problem List    Diagnosis Date Noted   • Hyponatremia 2023   • Nodule of right lung 07/10/2023   • Sepsis ([address]) 07/10/2023   • Pneumonia due to infectious organism 07/10/2023      LOS (days): 3  Geometric Mean LOS (GMLOS) (days): 5.00  Days to GMLOS:2.4     OBJECTIVE:  Risk of Unplanned Readmission Score: 19.75         Current admission status: Inpatient   Preferred Pharmacy:   [address] [place] [address] [address]  [address] [address]  Phone: [phone] Fax: [phone]    Primary Care Provider: AMANDA Giron    Primary Insurance: BLUE CROSS  Secondary Insurance: MEDICARE    DISCHARGE DETAILS:    Discharge planning discussed with[de-identified] Patient, Son Salvatore Boss and Serg Cabral from [facility]: Yes     CM contacted family/caregiver?: Yes  Were Treatment Team discharge recommendations reviewed with patient/caregiver?: Yes  Did patient/caregiver verbalize understanding of patient care needs?: Yes  Were patient/caregiver advised of the risks associated with not following Treatment Team discharge recommendations?: Yes    Contacts  Patient Contacts: Michelle Jordan ([place])   [phone] (Mobile)  Relationship to Patient[de-identified] Family  Contact Method: Phone  Phone Number: Michelle Jordan ([place])   [phone] (Mobile)  Reason/Outcome: Emergency Contact, Discharge  [facility]         Is the patient interested in [address] at discharge?: Yes  [facility] requested[de-identified] Occupational Therapy, Physical [address] [facility] Name[de-identified] [facility] External Referral Reason (only applicable if external HHA name selected): Patient has established relationship with provider  Home Health Follow-Up Provider[de-identified] PCP  Home Health Services Needed[de-identified] Evaluate Functional Status and Safety, Gait/ADL Training, Strengthening/Theraputic Exercises to Improve Function  Homebound Criteria Met[de-identified] Requires the Assistance of Another Person for Safe Ambulation or to Leave the Home  Supporting Clincal Findings[de-identified] Dyspnea with Exertion, Fatigues Easliy in [employer], Cognitive Deficit Requiring the Assistance of Others    DME Referral Provided  Referral made for DME?: Yes  DME referral completed for the following items[de-identified] Nebulizer    Treatment Team Recommendation: Facility Return  Discharge Destination Plan[de-identified] Facility Return  Transport at Discharge : [facility]  Dispatcher Contacted: Yes  Number/Name of Dispatcher: Via Round Trip  Transported by [employer] and Unit #): [place]  ETA of Transport (Date): 07/13/23  ETA of Transport (Time): 1000     Additional Comments: Discharge planning discussed with Kendy Bustamante from [facility]. Nebulizer ordered and delivered to patient from consignment. Per Kendy Bustamante facility uses [address]. Referral made to [facility] via [employer]. Kendy Bustamante can accept patient back tomorrow am. [facility] transportrequested for 7/14 at 10am.  COVID negative. Call to jaycee Gutierrez and updated same.  Update to ELVIRA Ariza and nurse Trudi Fuchs

## 2023-07-13 NOTE — PLAN OF CARE
Problem: OCCUPATIONAL THERAPY ADULT  Goal: Performs self-care activities at highest level of function for planned discharge setting. See evaluation for individualized goals. Description: Treatment Interventions: ADL retraining, Functional transfer training, Endurance training, Patient/family training, Compensatory technique education, Energy conservation, Activityengagement    See flowsheet documentation for full assessment, interventions and recommendations. Note: Limitation: Decreased ADL status, Decreased Safe judgement during ADL, Decreased cognition, Decreased endurance, Decreased self-care trans, Decreased high-level ADLs  Prognosis: Good  Assessment: Pt is a 81 y.o. male seen for OT evaluation s/p admit to [facility] on 7/10/2023 w/ Sepsis ([address] Comorbidities affecting pt's functional performance at time of assessment include: Pneumonia, Hyponatremia. Personal factors affecting pt at time of IE include:difficulty performing ADLS, limited insight into deficits and decreased initiation and engagement . Prior to admission, pt required A with ADLs. Upon evaluation: the following deficits impact occupational performance: decreased balance, decreased tolerance, impaired initiation, impaired memory, impaired sequencing, impaired problem solving and decreased safety awareness. Pt to benefit from continued skilled OT tx while in the hospital to address deficits as defined above and maximize level of functional independence w ADL's and functional mobility. Occupational Performance areas to address include: bathing/shower, toilet hygiene, dressing, functional mobility and clothing management. From OT standpoint, recommendation at time of d/c would be return to facility with rehab services.      OT Discharge Recommendation: Return to facility with rehabilitation services     [address], [place], OTR/L

## 2023-07-14 VITALS
HEIGHT: 72 IN | TEMPERATURE: 98.2 F | RESPIRATION RATE: 17 BRPM | OXYGEN SATURATION: 98 % | SYSTOLIC BLOOD PRESSURE: 133 MMHG | WEIGHT: 127.87 LBS | HEART RATE: 61 BPM | DIASTOLIC BLOOD PRESSURE: 75 MMHG | BODY MASS INDEX: 17.32 KG/M2

## 2023-07-14 LAB
ANION GAP SERPL CALCULATED.3IONS-SCNC: 9 MMOL/L
BASOPHILS # BLD AUTO: 0.06 THOUSANDS/ÂΜL (ref 0–0.1)
BASOPHILS NFR BLD AUTO: 1 % (ref 0–1)
BUN SERPL-MCNC: 21 MG/DL (ref 5–25)
CALCIUM SERPL-MCNC: 8.5 MG/DL (ref 8.4–10.2)
CHLORIDE SERPL-SCNC: 111 MMOL/L (ref 96–108)
CO2 SERPL-SCNC: 17 MMOL/L (ref 21–32)
CREAT SERPL-MCNC: 1.09 MG/DL (ref 0.6–1.3)
EOSINOPHIL # BLD AUTO: 0 THOUSAND/ÂΜL (ref 0–0.61)
EOSINOPHIL NFR BLD AUTO: 0 % (ref 0–6)
ERYTHROCYTE [DISTWIDTH] IN BLOOD BY AUTOMATED COUNT: 15.2 % (ref 11.6–15.1)
GFR SERPL CREATININE-BSD FRML MDRD: 60 ML/MIN/1.73SQ M
GLUCOSE SERPL-MCNC: 79 MG/DL (ref 65–140)
HCT VFR BLD AUTO: 34.4 % (ref 36.5–49.3)
HGB BLD-MCNC: 11.1 G/DL (ref 12–17)
IMM GRANULOCYTES # BLD AUTO: 0.21 THOUSAND/UL (ref 0–0.2)
IMM GRANULOCYTES NFR BLD AUTO: 2 % (ref 0–2)
LYMPHOCYTES # BLD AUTO: 1.73 THOUSANDS/ÂΜL (ref 0.6–4.47)
LYMPHOCYTES NFR BLD AUTO: 17 % (ref 14–44)
MCH RBC QN AUTO: 32.1 PG (ref 26.8–34.3)
MCHC RBC AUTO-ENTMCNC: 32.3 G/DL (ref 31.4–37.4)
MCV RBC AUTO: 99 FL (ref 82–98)
MONOCYTES # BLD AUTO: 0.96 THOUSAND/ÂΜL (ref 0.17–1.22)
MONOCYTES NFR BLD AUTO: 9 % (ref 4–12)
NEUTROPHILS # BLD AUTO: 7.39 THOUSANDS/ÂΜL (ref 1.85–7.62)
NEUTS SEG NFR BLD AUTO: 71 % (ref 43–75)
NRBC BLD AUTO-RTO: 0 /100 WBCS
PLATELET # BLD AUTO: 269 THOUSANDS/UL (ref 149–390)
PMV BLD AUTO: 9.4 FL (ref 8.9–12.7)
POTASSIUM SERPL-SCNC: 4.3 MMOL/L (ref 3.5–5.3)
PROCALCITONIN SERPL-MCNC: 0.16 NG/ML
RBC # BLD AUTO: 3.46 MILLION/UL (ref 3.88–5.62)
SODIUM SERPL-SCNC: 137 MMOL/L (ref 135–147)
WBC # BLD AUTO: 10.35 THOUSAND/UL (ref 4.31–10.16)

## 2023-07-14 PROCEDURE — 80048 BASIC METABOLIC PNL TOTAL CA: CPT

## 2023-07-14 PROCEDURE — 94760 N-INVAS EAR/PLS OXIMETRY 1: CPT

## 2023-07-14 PROCEDURE — 85025 COMPLETE CBC W/AUTO DIFF WBC: CPT

## 2023-07-14 PROCEDURE — 94664 DEMO&/EVAL PT USE INHALER: CPT

## 2023-07-14 PROCEDURE — 84145 PROCALCITONIN (PCT): CPT

## 2023-07-14 PROCEDURE — 94640 AIRWAY INHALATION TREATMENT: CPT

## 2023-07-14 PROCEDURE — 99239 HOSP IP/OBS DSCHRG MGMT >30: CPT

## 2023-07-14 RX ORDER — LEVALBUTEROL INHALATION SOLUTION 1.25 MG/3ML
1.25 SOLUTION RESPIRATORY (INHALATION)
Qty: 270 ML | Refills: 0 | Status: SHIPPED | OUTPATIENT
Start: 2023-07-14 | End: 2023-08-13

## 2023-07-14 RX ADMIN — QUETIAPINE FUMARATE 25 MG: 25 TABLET ORAL at 08:35

## 2023-07-14 RX ADMIN — Medication 2 G: at 08:42

## 2023-07-14 RX ADMIN — LEVALBUTEROL HYDROCHLORIDE 1.25 MG: 1.25 SOLUTION RESPIRATORY (INHALATION) at 07:23

## 2023-07-14 RX ADMIN — PANTOPRAZOLE SODIUM 40 MG: 40 TABLET, DELAYED RELEASE ORAL at 08:01

## 2023-07-14 RX ADMIN — ASPIRIN 81 MG: 81 TABLET, COATED ORAL at 08:35

## 2023-07-14 RX ADMIN — BUPROPION HYDROCHLORIDE 150 MG: 150 TABLET, EXTENDED RELEASE ORAL at 08:35

## 2023-07-14 RX ADMIN — HEPARIN SODIUM 5000 UNITS: 5000 INJECTION INTRAVENOUS; SUBCUTANEOUS at 08:36

## 2023-07-14 NOTE — DISCHARGE INSTR - AVS FIRST PAGE
Please follow up with your primary care physician within a week of discharge  Please follow up with pulmonology on discharge at number listed below. Call to schedule appointment  You will be receiving home health care on discharge  There have been no medication changes to your pre hospital medications. We did add Xopenex nebulizer treatments three times a day at recommendation of pulmonology  You had a CT scan that noted a pulmonary nodule (was also on prior CT scan). Recommendation is follow up CT in 3 months. Please discuss this with primary care physician.  Family did mention that they do not want intervention for this, so decision to do CT scan will be up to primary care physician

## 2023-07-14 NOTE — PLAN OF CARE
Problem: Prexisting or High Potential for Compromised Skin Integrity  Goal: Skin integrity is maintained or improved  Description: INTERVENTIONS:  - Identify patients at risk for skin breakdown  - Assess and monitor skin integrity  - Assess and monitor nutrition and hydration status  - Monitor labs   - Assess for incontinence   - Turn and reposition patient  - Assist with mobility/ambulation  - Relieve pressure over bony prominences  - Avoid friction and shearing  - Provide appropriate hygiene as needed including keeping skin clean and dry  - Evaluate need for skin moisturizer/barrier cream  - Collaborate with interdisciplinary team   - Patient/family teaching  - Consider wound care consult   Outcome: Adequate for Discharge     Problem: Potential for Falls  Goal: Patient will remain free of falls  Description: INTERVENTIONS:  - Educate patient/family on patient safety including physical limitations  - Instruct patient to call for assistance with activity   - Consult OT/PT to assist with strengthening/mobility   - Keep Call bell within reach  - Keep bed low and locked with side rails adjusted as appropriate  - Keep care items and personal belongings within reach  - Initiate and maintain comfort rounds  - Make Fall Risk Sign visible to staff  - Offer Toileting every 2 Hours, in advance of need  - Initiate/Maintain bed alarm  - Apply yellow socks and bracelet for high fall risk patients  - Consider moving patient to room near nurses station  Outcome: Adequate for Discharge     Problem: MOBILITY - ADULT  Goal: Maintain or return to baseline ADL function  Description: INTERVENTIONS:  -  Assess patient's ability to carry out ADLs; assess patient's baseline for ADL function and identify physical deficits which impact ability to perform ADLs (bathing, care of mouth/teeth, toileting, grooming, dressing, etc.)  - Assess/evaluate cause of self-care deficits   - Assess range of motion  - Assess patient's mobility; develop plan if impaired  - Assess patient's need for assistive devices and provide as appropriate  - Encourage maximum independence but intervene and supervise when necessary  - Involve family in performance of ADLs  - Assess for home care needs following discharge   - Consider OT consult to assist with ADL evaluation and planning for discharge  - Provide patient education as appropriate  Outcome: Adequate for Discharge  Goal: Maintains/Returns to pre admission functional level  Description: INTERVENTIONS:  - Perform BMAT or MOVE assessment daily.   - Set and communicate daily mobility goal to care team and patient/family/caregiver. - Collaborate with rehabilitation services on mobility goals if consulted  - Out of bed to chair 2 times a day   - Out of bed for meals 2 times a day  - Out of bed for toileting  - Record patient progress and toleration of activity level   Outcome: Adequate for Discharge     Problem: Nutrition/Hydration-ADULT  Goal: Nutrient/Hydration intake appropriate for improving, restoring or maintaining nutritional needs  Description: Monitor and assess patient's nutrition/hydration status for malnutrition. Collaborate with interdisciplinary team and initiate plan and interventions as ordered. Monitor patient's weight and dietary intake as ordered or per policy. Utilize nutrition screening tool and intervene as necessary. Determine patient's food preferences and provide high-protein, high-caloric foods as appropriate.      INTERVENTIONS:  - Monitor oral intake, urinary output, labs, and treatment plans  - Assess nutrition and hydration status and recommend course of action  - Evaluate amount of meals eaten  - Assist patient with eating if necessary   - Allow adequate time for meals  - Recommend/ encourage appropriate diets, oral nutritional supplements, and vitamin/mineral supplements  - Order, calculate, and assess calorie counts as needed  - Recommend, monitor, and adjust tube feedings and TPN/PPN based on assessed needs  - Assess need for intravenous fluids  - Provide specific nutrition/hydration education as appropriate  - Include patient/family/caregiver in decisions related to nutrition  Outcome: Adequate for Discharge     Problem: INFECTION - ADULT  Goal: Absence or prevention of progression during hospitalization  Description: INTERVENTIONS:  - Assess and monitor for signs and symptoms of infection  - Monitor lab/diagnostic results  - Monitor all insertion sites, i.e. indwelling lines, tubes, and drains  - Monitor endotracheal if appropriate and nasal secretions for changes in amount and color  - [place] appropriate cooling/warming therapies per order  - Administer medications as ordered  - Instruct and encourage patient and family to use good hand hygiene technique  - Identify and instruct in appropriate isolation precautions for identified infection/condition  Outcome: Adequate for Discharge  Goal: Absence of fever/infection during neutropenic period  Description: INTERVENTIONS:  - Monitor WBC    Outcome: Adequate for Discharge     Problem: NEUROSENSORY - ADULT  Goal: Achieves stable or improved neurological status  Description: INTERVENTIONS  - Monitor and report changes in neurological status  - Monitor vital signs such as temperature, blood pressure, glucose, and any other labs ordered   - Initiate measures to prevent increased intracranial pressure  - Monitor for seizure activity and implement precautions if appropriate      Outcome: Adequate for Discharge     Problem: GASTROINTESTINAL - ADULT  Goal: Maintains adequate nutritional intake  Description: INTERVENTIONS:  - Monitor percentage of each meal consumed  - Identify factors contributing to decreased intake, treat as appropriate  - Assist with meals as needed  - Monitor I&O, weight, and lab values if indicated  - Obtain nutrition services referral as needed  Outcome: Adequate for Discharge     Problem: METABOLIC, FLUID AND ELECTROLYTES - ADULT  Goal: Electrolytes maintained within normal limits  Description: INTERVENTIONS:  - Monitor labs and assess patient for signs and symptoms of electrolyte imbalances  - Administer electrolyte replacement as ordered  - Monitor response to electrolyte replacements, including repeat lab results as appropriate  - Instruct patient on fluid and nutrition as appropriate  Outcome: Adequate for Discharge  Goal: Fluid balance maintained  Description: INTERVENTIONS:  - Monitor labs   - Monitor I/O and WT  - Instruct patient on fluid and nutrition as appropriate  - Assess for signs & symptoms of volume excess or deficit  Outcome: Adequate for Discharge  Goal: Glucose maintained within target range  Description: INTERVENTIONS:  - Monitor Blood Glucose as ordered  - Assess for signs and symptoms of hyperglycemia and hypoglycemia  - Administer ordered medications to maintain glucose within target range  - Assess nutritional intake and initiate nutrition service referral as needed  Outcome: Adequate for Discharge     Problem: SKIN/TISSUE INTEGRITY - ADULT  Goal: Skin Integrity remains intact(Skin Breakdown Prevention)  Description: Assess:  -Perform [place] assessment every shift  -Clean and moisturize skin every shift  -Inspect skin when repositioning, toileting, and assisting with ADLS  -Assess extremities for adequate circulation and sensation     Bed Management:  -Have minimal linens on bed & keep smooth, unwrinkled  -Change linens as needed when moist or perspiring  -Avoid sitting or lying in one position for more than 2 hours while in bed  -Keep HOB at 20 degrees     Toileting:  -Offer bedside commode  -Assess for incontinence every 2 hours  -Use incontinent care products after each incontinent episode such as remedy    Activity:  -Mobilize patient 3 times a day  -Encourage activity and walks on unit  -Use appropriate equipment to lift or move patient in bed  -Instruct/ Assist with weight shifting every 30 min when out of bed in chair  -Consider limitation of chair time 2 hour intervals      Outcome: Adequate for Discharge     Problem: HEMATOLOGIC - ADULT  Goal: Maintains hematologic stability  Description: INTERVENTIONS  - Assess for signs and symptoms of bleeding or hemorrhage  - Monitor labs  - Administer supportive blood products/factors as ordered and appropriate  Outcome: Adequate for Discharge

## 2023-07-14 NOTE — PLAN OF CARE
Problem: Prexisting or High Potential for Compromised Skin Integrity  Goal: Skin integrity is maintained or improved  Description: INTERVENTIONS:  - Identify patients at risk for skin breakdown  - Assess and monitor skin integrity  - Assess and monitor nutrition and hydration status  - Monitor labs   - Assess for incontinence   - Turn and reposition patient  - Assist with mobility/ambulation  - Relieve pressure over bony prominences  - Avoid friction and shearing  - Provide appropriate hygiene as needed including keeping skin clean and dry  - Evaluate need for skin moisturizer/barrier cream  - Collaborate with interdisciplinary team   - Patient/family teaching  - Consider wound care consult   Outcome: Progressing     Problem: Potential for Falls  Goal: Patient will remain free of falls  Description: INTERVENTIONS:  - Educate patient/family on patient safety including physical limitations  - Instruct patient to call for assistance with activity   - Consult OT/PT to assist with strengthening/mobility   - Keep Call bell within reach  - Keep bed low and locked with side rails adjusted as appropriate  - Keep care items and personal belongings within reach  - Initiate and maintain comfort rounds  - Make Fall Risk Sign visible to staff  - Offer Toileting every  Hours, in advance of need  - Initiate/Maintain alarm  - Obtain necessary fall risk management equipment:   - Apply yellow socks and bracelet for high fall risk patients  - Consider moving patient to room near nurses station  Outcome: Progressing     Problem: MOBILITY - ADULT  Goal: Maintain or return to baseline ADL function  Description: INTERVENTIONS:  -  Assess patient's ability to carry out ADLs; assess patient's baseline for ADL function and identify physical deficits which impact ability to perform ADLs (bathing, care of mouth/teeth, toileting, grooming, dressing, etc.)  - Assess/evaluate cause of self-care deficits   - Assess range of motion  - Assess patient's mobility; develop plan if impaired  - Assess patient's need for assistive devices and provide as appropriate  - Encourage maximum independence but intervene and supervise when necessary  - Involve family in performance of ADLs  - Assess for home care needs following discharge   - Consider OT consult to assist with ADL evaluation and planning for discharge  - Provide patient education as appropriate  Outcome: Progressing  Goal: Maintains/Returns to pre admission functional level  Description: INTERVENTIONS:  - Perform BMAT or MOVE assessment daily.   - Set and communicate daily mobility goal to care team and patient/family/caregiver. - Collaborate with rehabilitation services on mobility goals if consulted  - Perform Range of Motion  times a day. - Reposition patient every  hours. - Dangle patient  times a day  - Stand patient  times a day  - Ambulate patient  times a day  - Out of bed to chair  times a day   - Out of bed for meals  times a day  - Out of bed for toileting  - Record patient progress and toleration of activity level   Outcome: Progressing     Problem: Nutrition/Hydration-ADULT  Goal: Nutrient/Hydration intake appropriate for improving, restoring or maintaining nutritional needs  Description: Monitor and assess patient's nutrition/hydration status for malnutrition. Collaborate with interdisciplinary team and initiate plan and interventions as ordered. Monitor patient's weight and dietary intake as ordered or per policy. Utilize nutrition screening tool and intervene as necessary. Determine patient's food preferences and provide high-protein, high-caloric foods as appropriate.      INTERVENTIONS:  - Monitor oral intake, urinary output, labs, and treatment plans  - Assess nutrition and hydration status and recommend course of action  - Evaluate amount of meals eaten  - Assist patient with eating if necessary   - Allow adequate time for meals  - Recommend/ encourage appropriate diets, oral nutritional supplements, and vitamin/mineral supplements  - Order, calculate, and assess calorie counts as needed  - Recommend, monitor, and adjust tube feedings and TPN/PPN based on assessed needs  - Assess need for intravenous fluids  - Provide specific nutrition/hydration education as appropriate  - Include patient/family/caregiver in decisions related to nutrition  Outcome: Progressing     Problem: INFECTION - ADULT  Goal: Absence or prevention of progression during hospitalization  Description: INTERVENTIONS:  - Assess and monitor for signs and symptoms of infection  - Monitor lab/diagnostic results  - Monitor all insertion sites, i.e. indwelling lines, tubes, and drains  - Monitor endotracheal if appropriate and nasal secretions for changes in amount and color  - [place] appropriate cooling/warming therapies per order  - Administer medications as ordered  - Instruct and encourage patient and family to use good hand hygiene technique  - Identify and instruct in appropriate isolation precautions for identified infection/condition  Outcome: Progressing  Goal: Absence of fever/infection during neutropenic period  Description: INTERVENTIONS:  - Monitor WBC    Outcome: Progressing     Problem: NEUROSENSORY - ADULT  Goal: Achieves stable or improved neurological status  Description: INTERVENTIONS  - Monitor and report changes in neurological status  - Monitor vital signs such as temperature, blood pressure, glucose, and any other labs ordered   - Initiate measures to prevent increased intracranial pressure  - Monitor for seizure activity and implement precautions if appropriate      Outcome: Progressing     Problem: GASTROINTESTINAL - ADULT  Goal: Maintains adequate nutritional intake  Description: INTERVENTIONS:  - Monitor percentage of each meal consumed  - Identify factors contributing to decreased intake, treat as appropriate  - Assist with meals as needed  - Monitor I&O, weight, and lab values if indicated  - Obtain nutrition services referral as needed  Outcome: Progressing     Problem: METABOLIC, FLUID AND ELECTROLYTES - ADULT  Goal: Electrolytes maintained within normal limits  Description: INTERVENTIONS:  - Monitor labs and assess patient for signs and symptoms of electrolyte imbalances  - Administer electrolyte replacement as ordered  - Monitor response to electrolyte replacements, including repeat lab results as appropriate  - Instruct patient on fluid and nutrition as appropriate  Outcome: Progressing  Goal: Fluid balance maintained  Description: INTERVENTIONS:  - Monitor labs   - Monitor I/O and WT  - Instruct patient on fluid and nutrition as appropriate  - Assess for signs & symptoms of volume excess or deficit  Outcome: Progressing  Goal: Glucose maintained within target range  Description: INTERVENTIONS:  - Monitor Blood Glucose as ordered  - Assess for signs and symptoms of hyperglycemia and hypoglycemia  - Administer ordered medications to maintain glucose within target range  - Assess nutritional intake and initiate nutrition service referral as needed  Outcome: Progressing     Problem: SKIN/TISSUE INTEGRITY - ADULT  Goal: Skin Integrity remains intact(Skin Breakdown Prevention)  Description: Assess:  -Perform Frederick assessment every   -Clean and moisturize skin every   -Inspect skin when repositioning, toileting, and assisting with ADLS  -Assess under medical devices such as  every   -Assess extremities for adequate circulation and sensation     Bed Management:  -Have minimal linens on bed & keep smooth, unwrinkled  -Change linens as needed when moist or perspiring  -Avoid sitting or lying in one position for more than  hours while in bed  -Keep HOB at degrees     Toileting:  -Offer bedside commode  -Assess for incontinence every   -Use incontinent care products after each incontinent episode such as     Activity:  -Mobilize patient  times a day  -Encourage activity and walks on unit  -Encourage or provide ROM exercises   -Turn and reposition patient every  Hours  -Use appropriate equipment to lift or move patient in bed  -Instruct/ Assist with weight shifting every  when out of bed in chair  -Consider limitation of chair time  hour intervals    Skin Care:  -Avoid use of baby powder, tape, friction and shearing, hot water or constrictive clothing  -Relieve pressure over bony prominences using   -Do not massage red bony areas    Next Steps:  -Teach patient strategies to minimize risks such as    -Consider consults to  interdisciplinary teams such as  Outcome: Progressing     Problem: HEMATOLOGIC - ADULT  Goal: Maintains hematologic stability  Description: INTERVENTIONS  - Assess for signs and symptoms of bleeding or hemorrhage  - Monitor labs  - Administer supportive blood products/factors as ordered and appropriate  Outcome: Progressing

## 2023-07-14 NOTE — DISCHARGE SUMMARY
[id number] [facility]  Discharge- Dayana Numbers 1935, 81 y.o. male MRN: [id number]  Unit/Bed#: -[facility] Encounter: [id number]  Primary Care Provider: AMANDA Toledo   Date and time admitted to hospital: 7/10/2023  8:53 PM    * Sepsis [facility])  Assessment & Plan  · Patient sent from dementia unit secondary to vomiting and abdominal pain  · Met sepsis criteria on arrival with leukocytosis, tachycardia, in setting of possible enteritis on CT scan  · All sepsis criteria have resolved  · 7/10 WBC 27.22, continued to trend downward, now resolved  · 7/10 blood cultures x2 negative to date  · 7/10 CT abdomen pelvis: Suggested enteritis. Also noted patchy bibasilar groundglass opacities, suggest correlate for atypical/viral infection or aspiration   · 7/11 urine for strep pneumonia and Legionella negative  · 7/11 MRSA swab negative  · GI consulted, appreciate recommendations. Felt likely viral gastroenteritis. · Pulmonology consulted, appreciate recommendations  · Speech therapy consulted, appreciate recommendations  · Patient received IV Zosyn from 7/10 through 7/13 at which time was discontinued so patient could be monitored off of antibiotics  · Patient remains afebrile, no leukocytosis, negative procalcitonin today  · Patient will be discharged to personal care home today, no need for further antibiotics        Pneumonia due to infectious organism  Assessment & Plan  · Present on admission   · 7/10 CT abdomen pelvis: Noted patchy bibasilar groundglass opacities, suggested correlating for atypical/viral infection or aspiration  · Initiated on Zosyn in the ED, continued on floor through 7/13  · Pulmonology consulted, appreciate recommendations  · Speech therapy consulted, appreciate recommendations  · Continue Xopenex treatments on discharge per pulmonary recommendations  · Patient was monitored off of antibiotics since yesterday, remains afebrile, normal procalcitonin this a.m. · Patient will be discharged to [facility] today. No further antibiotics needed       Nodule of right lung  Assessment & Plan  · CT: Nonspecific 2.2 x 1.4 cm spiculated nodule in the subpleural region of the right upper lobe noted with surrounding hazy parenchymal opacity. Recommend follow-up CT chest at 3 months, PET/CT, or tissue sampling per current Fleischner Society guidelines. · Incidental finding on previous workup from fall on 6/27. ·  F/u w/ PCP on discharge    Hyponatremia  Assessment & Plan  · Present on admission as evidenced by sodium of 131  · Now resolved  · Likely hypovolemic hypotonic hyponatremia in the setting of sepsis  · Patient received IV fluid hydration, hyponatremia resolved        Discharging Physician / Practitioner: AMANDA Chanel  PCP: Ruslan Mejia, [address] [place] [address]  Admission Date:   Admission Orders (From admission, onward)     Ordered        07/10/23 2348  INPATIENT ADMISSION  Once                      Discharge Date: 07/14/23    Medical Problems     Resolved Problems  Date Reviewed: 7/14/2023          Resolved    Enteritis 7/11/2023     Resolved by  Kaila Jones PA-C          Consultations During Hospital Stay:  · GI, pulmonary, PT OT ST    Procedures Performed:   · None    Significant Findings / Test Results:   · 7/10 CT abdomen pelvis: Suggest enteritis; patchy bibasilar groundglass opacities correlate for atypical/viral infection or aspiration; subacute left ischial tuberosity fracture (also noted on prior imaging)  · 7/10 CT head: No acute intracranial abnormality, chronic microangiopathic changes  · 7/11 WBC 22.68, repeat today 10.35  · 7/11 procalcitonin 0.25, repeat today 0.16  · 7/13 COVID/flu/RSV negative    Incidental Findings:   · See CT abdomen pelvis above    Test Results Pending at Discharge (will require follow up):    · None     Outpatient Tests Requested:  · To be determined by your primary care physician    Complications: None    Reason for Admission: Sepsis    Hospital Course:     Mary Winkler is a 81 y.o. male patient who originally presented to the hospital on 7/10/2023 from his personal-care home with complaints of vomiting and abdominal pain. Of note patient has baseline dementia and is poor historian at baseline. CT abdomen pelvis suggested possible enteritis, also noted bibasilar groundglass opacities suggested correlating for atypical/viral infection or aspiration. Patient met sepsis criteria on arrival with leukocytosis, tachycardia, in setting of CT scan noting enteritis and possible pneumonia. Patient's lactic acid on admission was 4.0, received IV fluid resuscitation in ED. Blood cultures x2 negative to date. Patient was initiated on IV Zosyn in the ED which continued on the floor through 7/13. GI was consulted, recommended bland diet and increase diet as tolerated and to continue PPI. Speech therapy saw patient, recommended dysphagia level 2 diet with thin liquids. Patient is tolerating this diet currently. Pulmonology was also consulted and followed patient. Patient does have a right upper lobe nodule which has been ongoing. Recommendations are for follow-up CT chest in 3 months. I did speak with the patient's son Debbie Pineda, who stated that family does not want further intervention for this. I will leave follow-up CAT scan up to patient's primary care physician. The patient was monitored off of antibiotics overnight, procalcitonin was negative today, afebrile. Patient will be discharged to [facility] on Xopenex treatments 3 times a day at recommendations of pulmonology. They will also follow with him in the outpatient setting. Patient will be discharged to [facility] today with visiting nurses. Please see above list of diagnoses and related plan for additional information. Condition at Discharge: good     Discharge Day Visit / Exam:     Subjective: Patient pleasant. States he is happy to be going home today.   Denies any pain or discomfort  Vitals: Blood Pressure: 133/75 (07/14/23 0724)  Pulse: 61 (07/14/23 0724)  Temperature: 98.2 °F (36.8 °C) (07/14/23 0724)  Temp Source: Oral (07/14/23 0724)  Respirations: 17 (07/14/23 0724)  Height: 6' (182.9 cm) (07/11/23 1300)  Weight - Scale: 58 kg (127 lb 13.9 oz) (07/11/23 1439)  SpO2: 98 % (07/14/23 0735)  Exam:   Physical Exam  Vitals and nursing note reviewed. Constitutional:       General: He is not in acute distress. Appearance: Normal appearance. He is well-developed. HENT:      Head: Normocephalic and atraumatic. Nose: Nose normal.      Mouth/Throat:      Mouth: Mucous membranes are moist.   Cardiovascular:      Rate and Rhythm: Normal rate and regular rhythm. Pulses: Normal pulses. Heart sounds: Normal heart sounds. No murmur heard. Pulmonary:      Effort: Pulmonary effort is normal. No respiratory distress. Breath sounds: Normal breath sounds. No wheezing or rales. Abdominal:      General: Bowel sounds are normal. There is no distension. Palpations: Abdomen is soft. Tenderness: There is no abdominal tenderness. There is no guarding. Musculoskeletal:         General: No swelling. Normal range of motion. Skin:     General: Skin is warm and dry. Capillary Refill: Capillary refill takes less than 2 seconds. Neurological:      General: No focal deficit present. Mental Status: He is alert. Mental status is at baseline. Comments: Oriented to person and place, disoriented to time and situation which is baseline   Psychiatric:         Mood and Affect: Mood normal.         Behavior: Behavior normal.       Discussion with Family: Course of hospitalization including testing and treatments explained to patient in terms he could understand. Although has some baseline dementia, appears to understand what has been explained.      Discharge instructions/Information to patient and family:   See after visit summary for information provided to patient and family. Provisions for Follow-Up Care:  See after visit summary for information related to follow-up care and any pertinent home health orders. Disposition:     Assisted Living Facility at [facility]    For Discharges to [facility]:   · Not Applicable to this Patient - Not Applicable to this Patient    Planned Readmission: No     Discharge Statement:  I spent 40 minutes discharging the patient. This time was spent on the day of discharge. I had direct contact with the patient on the day of discharge. Greater than 50% of the total time was spent examining patient, answering all patient questions, arranging and discussing plan of care with patient as well as directly providing post-discharge instructions. Additional time then spent on discharge activities. Discharge Medications:  See after visit summary for reconciled discharge medications provided to patient and family.       ** Please Note: This note has been constructed using a voice recognition system **

## 2023-07-14 NOTE — NURSING NOTE
Patient discharged to [facility]. Report called to John. Patient left via transport from [facility]. He was in stable condition. He was given the nebulizer as well as his activity apron to take with him. John is aware of same.

## 2023-07-16 LAB
BACTERIA BLD CULT: NORMAL
BACTERIA BLD CULT: NORMAL

## 2023-07-20 LAB
DME PARACHUTE DELIVERY DATE ACTUAL: NORMAL
DME PARACHUTE DELIVERY DATE REQUESTED: NORMAL
DME PARACHUTE ITEM DESCRIPTION: NORMAL
DME PARACHUTE ORDER STATUS: NORMAL
DME PARACHUTE SUPPLIER NAME: NORMAL
DME PARACHUTE SUPPLIER PHONE: NORMAL

## 2023-08-11 ENCOUNTER — APPOINTMENT (EMERGENCY)
Dept: RADIOLOGY | Facility: HOSPITAL | Age: 88
DRG: 056 | End: 2023-08-11
Payer: MEDICARE

## 2023-08-11 ENCOUNTER — HOSPITAL ENCOUNTER (INPATIENT)
Facility: HOSPITAL | Age: 88
LOS: 3 days | Discharge: HOSPICE | DRG: 056 | End: 2023-08-14
Attending: STUDENT IN AN ORGANIZED HEALTH CARE EDUCATION/TRAINING PROGRAM | Admitting: INTERNAL MEDICINE
Payer: MEDICARE

## 2023-08-11 ENCOUNTER — APPOINTMENT (EMERGENCY)
Dept: CT IMAGING | Facility: HOSPITAL | Age: 88
DRG: 056 | End: 2023-08-11
Payer: MEDICARE

## 2023-08-11 DIAGNOSIS — R53.83 FATIGUE: ICD-10-CM

## 2023-08-11 DIAGNOSIS — E87.1 HYPONATREMIA: ICD-10-CM

## 2023-08-11 DIAGNOSIS — Z71.89 GOALS OF CARE, COUNSELING/DISCUSSION: ICD-10-CM

## 2023-08-11 DIAGNOSIS — N17.9 ACUTE KIDNEY INJURY (HCC): ICD-10-CM

## 2023-08-11 DIAGNOSIS — J18.9 PNEUMONIA OF BOTH LOWER LOBES DUE TO INFECTIOUS ORGANISM: ICD-10-CM

## 2023-08-11 DIAGNOSIS — G93.40 ENCEPHALOPATHY: ICD-10-CM

## 2023-08-11 DIAGNOSIS — R29.90 STROKE-LIKE SYMPTOM: Primary | ICD-10-CM

## 2023-08-11 DIAGNOSIS — D72.829 LEUKOCYTOSIS: ICD-10-CM

## 2023-08-11 PROBLEM — R65.10 SIRS (SYSTEMIC INFLAMMATORY RESPONSE SYNDROME) (HCC): Status: ACTIVE | Noted: 2023-07-10

## 2023-08-11 PROBLEM — R74.8 ELEVATED CREATINE KINASE: Status: ACTIVE | Noted: 2023-01-01

## 2023-08-11 LAB
2HR DELTA HS TROPONIN: 0 NG/L
ANION GAP SERPL CALCULATED.3IONS-SCNC: 12 MMOL/L
APTT PPP: 31 SECONDS (ref 23–37)
BACTERIA UR QL AUTO: ABNORMAL /HPF
BILIRUB UR QL STRIP: NEGATIVE
BUN SERPL-MCNC: 46 MG/DL (ref 5–25)
CALCIUM SERPL-MCNC: 10 MG/DL (ref 8.4–10.2)
CARDIAC TROPONIN I PNL SERPL HS: 17 NG/L
CARDIAC TROPONIN I PNL SERPL HS: 17 NG/L
CHLORIDE SERPL-SCNC: 101 MMOL/L (ref 96–108)
CLARITY UR: ABNORMAL
CO2 SERPL-SCNC: 20 MMOL/L (ref 21–32)
COLOR UR: YELLOW
CREAT SERPL-MCNC: 1.46 MG/DL (ref 0.6–1.3)
ERYTHROCYTE [DISTWIDTH] IN BLOOD BY AUTOMATED COUNT: 14.3 % (ref 11.6–15.1)
FINE GRAN CASTS URNS QL MICRO: ABNORMAL /LPF
FLUAV RNA RESP QL NAA+PROBE: NEGATIVE
FLUBV RNA RESP QL NAA+PROBE: NEGATIVE
GFR SERPL CREATININE-BSD FRML MDRD: 42 ML/MIN/1.73SQ M
GLUCOSE SERPL-MCNC: 127 MG/DL (ref 65–140)
GLUCOSE SERPL-MCNC: 136 MG/DL (ref 65–140)
GLUCOSE UR STRIP-MCNC: NEGATIVE MG/DL
HCT VFR BLD AUTO: 40 % (ref 36.5–49.3)
HGB BLD-MCNC: 12.9 G/DL (ref 12–17)
HGB UR QL STRIP.AUTO: NEGATIVE
HYALINE CASTS #/AREA URNS LPF: ABNORMAL /LPF
INR PPP: 1.31 (ref 0.84–1.19)
KETONES UR STRIP-MCNC: NEGATIVE MG/DL
LACTATE SERPL-SCNC: 1.9 MMOL/L (ref 0.5–2)
LEUKOCYTE ESTERASE UR QL STRIP: NEGATIVE
LIPASE SERPL-CCNC: 15 U/L (ref 11–82)
MCH RBC QN AUTO: 31.5 PG (ref 26.8–34.3)
MCHC RBC AUTO-ENTMCNC: 32.3 G/DL (ref 31.4–37.4)
MCV RBC AUTO: 98 FL (ref 82–98)
NITRITE UR QL STRIP: NEGATIVE
NON-SQ EPI CELLS URNS QL MICRO: ABNORMAL /HPF
PH UR STRIP.AUTO: 5.5 [PH]
PLATELET # BLD AUTO: 238 THOUSANDS/UL (ref 149–390)
PMV BLD AUTO: 9.5 FL (ref 8.9–12.7)
POTASSIUM SERPL-SCNC: 4.4 MMOL/L (ref 3.5–5.3)
PROCALCITONIN SERPL-MCNC: 1.24 NG/ML
PROT UR STRIP-MCNC: ABNORMAL MG/DL
PROTHROMBIN TIME: 16.4 SECONDS (ref 11.6–14.5)
RBC # BLD AUTO: 4.09 MILLION/UL (ref 3.88–5.62)
RBC #/AREA URNS AUTO: ABNORMAL /HPF
RSV RNA RESP QL NAA+PROBE: NEGATIVE
SARS-COV-2 RNA RESP QL NAA+PROBE: NEGATIVE
SODIUM SERPL-SCNC: 133 MMOL/L (ref 135–147)
SP GR UR STRIP.AUTO: >=1.03
TSH SERPL DL<=0.05 MIU/L-ACNC: 2.46 UIU/ML (ref 0.45–4.5)
UROBILINOGEN UR QL STRIP.AUTO: 0.2 E.U./DL
WBC # BLD AUTO: 18.02 THOUSAND/UL (ref 4.31–10.16)
WBC #/AREA URNS AUTO: ABNORMAL /HPF

## 2023-08-11 PROCEDURE — 84484 ASSAY OF TROPONIN QUANT: CPT | Performed by: STUDENT IN AN ORGANIZED HEALTH CARE EDUCATION/TRAINING PROGRAM

## 2023-08-11 PROCEDURE — 70498 CT ANGIOGRAPHY NECK: CPT

## 2023-08-11 PROCEDURE — 96360 HYDRATION IV INFUSION INIT: CPT

## 2023-08-11 PROCEDURE — 85027 COMPLETE CBC AUTOMATED: CPT | Performed by: STUDENT IN AN ORGANIZED HEALTH CARE EDUCATION/TRAINING PROGRAM

## 2023-08-11 PROCEDURE — 93005 ELECTROCARDIOGRAM TRACING: CPT

## 2023-08-11 PROCEDURE — 36415 COLL VENOUS BLD VENIPUNCTURE: CPT | Performed by: STUDENT IN AN ORGANIZED HEALTH CARE EDUCATION/TRAINING PROGRAM

## 2023-08-11 PROCEDURE — 99223 1ST HOSP IP/OBS HIGH 75: CPT | Performed by: PHYSICIAN ASSISTANT

## 2023-08-11 PROCEDURE — 99291 CRITICAL CARE FIRST HOUR: CPT | Performed by: STUDENT IN AN ORGANIZED HEALTH CARE EDUCATION/TRAINING PROGRAM

## 2023-08-11 PROCEDURE — 81001 URINALYSIS AUTO W/SCOPE: CPT | Performed by: STUDENT IN AN ORGANIZED HEALTH CARE EDUCATION/TRAINING PROGRAM

## 2023-08-11 PROCEDURE — 83690 ASSAY OF LIPASE: CPT | Performed by: STUDENT IN AN ORGANIZED HEALTH CARE EDUCATION/TRAINING PROGRAM

## 2023-08-11 PROCEDURE — 84443 ASSAY THYROID STIM HORMONE: CPT | Performed by: STUDENT IN AN ORGANIZED HEALTH CARE EDUCATION/TRAINING PROGRAM

## 2023-08-11 PROCEDURE — 84145 PROCALCITONIN (PCT): CPT | Performed by: PHYSICIAN ASSISTANT

## 2023-08-11 PROCEDURE — 87040 BLOOD CULTURE FOR BACTERIA: CPT | Performed by: STUDENT IN AN ORGANIZED HEALTH CARE EDUCATION/TRAINING PROGRAM

## 2023-08-11 PROCEDURE — 70496 CT ANGIOGRAPHY HEAD: CPT

## 2023-08-11 PROCEDURE — 0241U HB NFCT DS VIR RESP RNA 4 TRGT: CPT | Performed by: STUDENT IN AN ORGANIZED HEALTH CARE EDUCATION/TRAINING PROGRAM

## 2023-08-11 PROCEDURE — G1004 CDSM NDSC: HCPCS

## 2023-08-11 PROCEDURE — 80048 BASIC METABOLIC PNL TOTAL CA: CPT | Performed by: STUDENT IN AN ORGANIZED HEALTH CARE EDUCATION/TRAINING PROGRAM

## 2023-08-11 PROCEDURE — 82948 REAGENT STRIP/BLOOD GLUCOSE: CPT

## 2023-08-11 PROCEDURE — 99285 EMERGENCY DEPT VISIT HI MDM: CPT

## 2023-08-11 PROCEDURE — 96361 HYDRATE IV INFUSION ADD-ON: CPT

## 2023-08-11 PROCEDURE — 85730 THROMBOPLASTIN TIME PARTIAL: CPT | Performed by: STUDENT IN AN ORGANIZED HEALTH CARE EDUCATION/TRAINING PROGRAM

## 2023-08-11 PROCEDURE — 71260 CT THORAX DX C+: CPT

## 2023-08-11 PROCEDURE — 99292 CRITICAL CARE ADDL 30 MIN: CPT | Performed by: STUDENT IN AN ORGANIZED HEALTH CARE EDUCATION/TRAINING PROGRAM

## 2023-08-11 PROCEDURE — 81003 URINALYSIS AUTO W/O SCOPE: CPT | Performed by: STUDENT IN AN ORGANIZED HEALTH CARE EDUCATION/TRAINING PROGRAM

## 2023-08-11 PROCEDURE — 85610 PROTHROMBIN TIME: CPT | Performed by: STUDENT IN AN ORGANIZED HEALTH CARE EDUCATION/TRAINING PROGRAM

## 2023-08-11 PROCEDURE — 74177 CT ABD & PELVIS W/CONTRAST: CPT

## 2023-08-11 PROCEDURE — 83605 ASSAY OF LACTIC ACID: CPT | Performed by: STUDENT IN AN ORGANIZED HEALTH CARE EDUCATION/TRAINING PROGRAM

## 2023-08-11 RX ORDER — ATORVASTATIN CALCIUM 40 MG/1
40 TABLET, FILM COATED ORAL EVERY EVENING
Status: DISCONTINUED | OUTPATIENT
Start: 2023-08-11 | End: 2023-08-14 | Stop reason: HOSPADM

## 2023-08-11 RX ORDER — HEPARIN SODIUM 5000 [USP'U]/ML
5000 INJECTION, SOLUTION INTRAVENOUS; SUBCUTANEOUS EVERY 8 HOURS SCHEDULED
Status: DISCONTINUED | OUTPATIENT
Start: 2023-08-11 | End: 2023-08-14 | Stop reason: HOSPADM

## 2023-08-11 RX ORDER — PANTOPRAZOLE SODIUM 40 MG/1
40 TABLET, DELAYED RELEASE ORAL
Status: DISCONTINUED | OUTPATIENT
Start: 2023-08-12 | End: 2023-08-14 | Stop reason: HOSPADM

## 2023-08-11 RX ORDER — SODIUM CHLORIDE 9 MG/ML
100 INJECTION, SOLUTION INTRAVENOUS CONTINUOUS
Status: DISCONTINUED | OUTPATIENT
Start: 2023-08-12 | End: 2023-08-12

## 2023-08-11 RX ORDER — ASCORBIC ACID 500 MG
500 TABLET ORAL 2 TIMES DAILY
Status: DISCONTINUED | OUTPATIENT
Start: 2023-08-11 | End: 2023-08-14 | Stop reason: HOSPADM

## 2023-08-11 RX ORDER — CEFTRIAXONE 2 G/50ML
2000 INJECTION, SOLUTION INTRAVENOUS EVERY 24 HOURS
Status: DISCONTINUED | OUTPATIENT
Start: 2023-08-11 | End: 2023-08-14 | Stop reason: HOSPADM

## 2023-08-11 RX ORDER — BUPROPION HYDROCHLORIDE 150 MG/1
300 TABLET ORAL DAILY
Status: DISCONTINUED | OUTPATIENT
Start: 2023-08-12 | End: 2023-08-14 | Stop reason: HOSPADM

## 2023-08-11 RX ORDER — ASPIRIN 81 MG/1
81 TABLET, CHEWABLE ORAL DAILY
Status: DISCONTINUED | OUTPATIENT
Start: 2023-08-12 | End: 2023-08-14 | Stop reason: HOSPADM

## 2023-08-11 RX ORDER — SODIUM CHLORIDE, SODIUM GLUCONATE, SODIUM ACETATE, POTASSIUM CHLORIDE, MAGNESIUM CHLORIDE, SODIUM PHOSPHATE, DIBASIC, AND POTASSIUM PHOSPHATE .53; .5; .37; .037; .03; .012; .00082 G/100ML; G/100ML; G/100ML; G/100ML; G/100ML; G/100ML; G/100ML
1000 INJECTION, SOLUTION INTRAVENOUS EVERY 4 HOURS
Status: COMPLETED | OUTPATIENT
Start: 2023-08-11 | End: 2023-08-12

## 2023-08-11 RX ADMIN — OXYCODONE HYDROCHLORIDE AND ACETAMINOPHEN 500 MG: 500 TABLET ORAL at 23:02

## 2023-08-11 RX ADMIN — CEFTRIAXONE 2000 MG: 2 INJECTION, SOLUTION INTRAVENOUS at 21:23

## 2023-08-11 RX ADMIN — ATORVASTATIN CALCIUM 40 MG: 40 TABLET, FILM COATED ORAL at 22:36

## 2023-08-11 RX ADMIN — SODIUM CHLORIDE, SODIUM GLUCONATE, SODIUM ACETATE, POTASSIUM CHLORIDE AND MAGNESIUM CHLORIDE 1000 ML: 526; 502; 368; 37; 30 INJECTION, SOLUTION INTRAVENOUS at 22:32

## 2023-08-11 RX ADMIN — HEPARIN SODIUM 5000 UNITS: 5000 INJECTION INTRAVENOUS; SUBCUTANEOUS at 22:36

## 2023-08-11 RX ADMIN — ASPIRIN 324 MG: 81 TABLET, COATED ORAL at 22:36

## 2023-08-11 RX ADMIN — IOHEXOL 100 ML: 350 INJECTION, SOLUTION INTRAVENOUS at 19:28

## 2023-08-11 RX ADMIN — SODIUM CHLORIDE 1000 ML: 0.9 INJECTION, SOLUTION INTRAVENOUS at 19:21

## 2023-08-11 NOTE — QUICK NOTE
Called by  regarding stroke alert at 7:14 PM, neuro response was immediate. 81year old male presenting with altered mental status, decreased movement in the right arm and bilateral lower extremities, gaze paresis? And dysarthria. .  - NIHSS 18  - LKW unclear,       TH & CTA were unremarkable for any acute pathology, LVO, or other IR target. IV thrombolysis was not given due to unknown last known well      Recommend loading aspirin 325 mg once and continuing 81 mg daily.  - Recommend metabolic and infectious work-up, suspicion that presentation is more of an encephalopathy than stroke. However given the focal but overall not localizable exam, would recommend MRI brain.  - No additional recommendations at this time.

## 2023-08-11 NOTE — LETTER
August 14, 2023     [facility]    Patient: Nicky Vo   YOB: 1935   Date of Visit: 8/11/2023       Dear Dr. Elena: Thank you for referring Nicky Vo to me for evaluation. Below are my notes for this consultation. If you have questions, please do not hesitate to call me. I look forward to following your patient along with you. Sincerely,        No name on file        CC: No Recipients    Ace Wilder MD  8/13/2023  9:23 AM  Signed  [address]  Progress Note  Name: Elsa Sousa  MRN: [id number]  Unit/Bed#: -01 I Date of Admission: 8/11/2023   Date of Service: 8/13/2023 I Hospital Day: 2    Assessment/Plan   Goals of care, counseling/discussion  Assessment & Plan  Patient is currently alert, awake, and oriented x1 only-in the setting of having advanced Alzheimer's dementia, patient lives in a dementia unit at a local care facility  On Saturday, 8/12/2023, I had a long discussion with the patient's daughter Ericka Quinn who is the healthcare proxy, and with the patient's son whose name is also Garret Living on speaker phone, and we reviewed the patient's poor quality of life, and extensive multiple medical comorbid conditions including:  Advanced Alzheimer's dementia,a right upper lobe lesion suspicious for a cancer, an infrarenal abdominal aortic aneurysm, generalized deconditioning, chronic emphysema, failure to thrive with a BMI of 12, an advancing age of 81, and a 2.9 x 2.0 cm structure is seen in the right inguinal canal suspicious of herniated bowel, and a declining quality of life overall  Upon completion of my conversation, the patient's daughter, and son were in agreement with just focusing on quality of life moving forward rather than quantity of life  They would like to get their father back to his facility under the umbrella of hospice, case management was notified.   CODE STATUS was updated to DNR/DNI  Case management will try and get the patient back to his facility with hospice on Monday, 8/14/2023  No further aggressive inpatient testing, treatment, and/or work-up is needed. SIRS (systemic inflammatory response syndrome) (HCC)  Assessment & Plan  Noted by leukocytosis and tachycardia present on admission. Lactic acid negative  Unclear etiology of infection UA negative leuks nitrate, no acute infectious process on imaging  Patient was started on empiric ceftriaxone-we will continue the same-day 3  White blood cell count 18.02>14.70>11.88  Procalcitonin testing has improved from 1.24-0.82  Blood cultures-no growth at 24 hours    * Stroke-like symptoms  Assessment & Plan  Symptoms are more consistent with an acute toxic/metabolic encephalopathy versus progression of Alzheimer's dementia, however the patient was admitted for stroke to be ruled out  Patient is currently alert, awake, and oriented to person only, not to place, not to time  Work-up thus far: -   #1. CT brain-1. No acute intracranial CT abnormality. 2.  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 #2.  CTA stroke alert head and neck-1. Patent major vessels of the [place] without high-grade stenosis. No aneurysm. 2.  No hemodynamically significant stenosis or dissection of cervical carotid and vertebral arteries. Normal variant hypoplastic left vertebral artery. 3.  No significant change in 2.8 cm right upper lobe spiculated nodule from chest CT 6/27/2023. Given short interval, this is still suspicious for malignancy. Recommend either PET/CT, close follow-up chest CT (3 months from prior CT 6/27/2023), or   sampling. #3.  MRI brain- canceled in view of patient being transitioned to hospice  #4. Formal neurology evaluation- canceled in view of patient being transitioned to hospice  #5. Fasting lipid profile-reviewed  #6. HbA1c is 5.5%  #7.   PT and OT eval is pending  Continue aspirin, and Lipitor at this time for secondary prevention      Hyponatremia  Assessment & Plan  Hypovolemic hyponatremia that has resolved with IV fluid    Elevated creatine kinase  Assessment & Plan  Mild acute kidney injury present on admission  Baseline creatinine is 1-1.2  Arrival creatinine 1.46  Current creatinine 1.07  No further blood draws    Nodule of right lung  Assessment & Plan  CT: Previously seen right upper lobe spiculated nodule measuring 2.6 x 1.8 cm similar to the prior exam. PET/CT is again recommended. Await family decision regarding how aggressive they would like to be  At baseline, patient would not be a good candidate for any type of chemo and/or radiation therapy in view of his advancing age, comorbid medical conditions, and overall generalized deconditioning  No further work-up in view of patient being transitioned to hospice            VTE Prophylaxis:  Heparin    Patient Centered Rounds: I have performed bedside rounds with nursing staff today. Discussions with Specialists or Other Care Team Provider: Case management, nursing  Education and Discussions with Family / Patient: Attempts to call the patient's daughter today at 9:22 AM-no answer    Current Length of Stay: 2 day(s)    Current Patient Status: Inpatient   Certification Statement: The patient will continue to require additional inpatient hospital stay due to The need for hospice evaluation    Discharge Plan: Hopeful discharge planning for Monday, 2023    Code Status: Level 3 - DNAR and DNI    Subjective:   Patient seen, resting in bed, remains confused, is alert, awake, and oriented x1    Objective:     Vitals:   Temp (24hrs), Av.7 °F (36.5 °C), Min:97 °F (36.1 °C), Max:98.1 °F (36.7 °C)    Temp:  [97 °F (36.1 °C)-98.1 °F (36.7 °C)] 97 °F (36.1 °C)  HR:  [74-91] 74  Resp:  [18-20] 20  BP: (116-144)/(65-88) 133/65  SpO2:  [91 %-95 %] 91 %  Body mass index is 16.97 kg/m².      Input and Output Summary (last 24 hours): Intake/Output Summary (Last 24 hours) at 8/13/2023 0920  Last data filed at 8/12/2023 1700  Gross per 24 hour   Intake --   Output 725 ml   Net -725 ml       Physical Exam:   Physical Exam  Vitals and nursing note reviewed. Constitutional:       General: He is not in acute distress. Appearance: Normal appearance. He is not ill-appearing. HENT:      Head: Normocephalic and atraumatic. Nose: Nose normal.   Eyes:      Extraocular Movements: Extraocular movements intact. Pupils: Pupils are equal, round, and reactive to light. Cardiovascular:      Rate and Rhythm: Normal rate and regular rhythm. Pulses: Normal pulses. Heart sounds: Normal heart sounds. No murmur heard. No friction rub. No gallop. Pulmonary:      Effort: Pulmonary effort is normal.      Breath sounds: Normal breath sounds. Abdominal:      General: There is no distension. Palpations: Abdomen is soft. There is no mass. Tenderness: There is no abdominal tenderness. There is no guarding or rebound. Musculoskeletal:         General: No swelling or tenderness. Normal range of motion. Cervical back: Normal range of motion and neck supple. No rigidity. No muscular tenderness. Right lower leg: No edema. Left lower leg: No edema. Skin:     General: Skin is warm. Capillary Refill: Capillary refill takes less than 2 seconds. Findings: No erythema or rash. Neurological:      General: No focal deficit present. Mental Status: He is alert. Mental status is at baseline.       Comments: Demented at baseline   Psychiatric:         Mood and Affect: Mood normal.         Behavior: Behavior normal.         Additional Data:     Labs:    Results from last 7 days   Lab Units 08/13/23  0531   WBC Thousand/uL 11.88*   HEMOGLOBIN g/dL 10.8*   HEMATOCRIT % 33.5*   PLATELETS Thousands/uL 200   NEUTROS PCT % 79*   LYMPHS PCT % 10*   MONOS PCT % 10   EOS PCT % 0     Results from last 7 days   Lab Units 08/13/23  0531   SODIUM mmol/L 139   POTASSIUM mmol/L 3.9   CHLORIDE mmol/L 110*   CO2 mmol/L 20*   BUN mg/dL 23   CREATININE mg/dL 1.07   CALCIUM mg/dL 8.5     Results from last 7 days   Lab Units 08/11/23  1920   INR  1.31*     Results from last 7 days   Lab Units 08/11/23  1918   POC GLUCOSE mg/dl 127     Results from last 7 days   Lab Units 08/12/23  0442   HEMOGLOBIN A1C % 5.5       * I Have Reviewed All Lab Data Listed Above. * Additional Pertinent Lab Tests Reviewed: [address] Admission  Reviewed    Imaging:  Imaging Reports Reviewed Today Include: None    Recent Cultures (last 7 days):     Results from last 7 days   Lab Units 08/11/23 1952 08/11/23 1920   BLOOD CULTURE  No Growth at 24 hrs. Received in Microbiology Lab. Culture in Progress.        Last 24 Hours Medication List:   Current Facility-Administered Medications   Medication Dose Route Frequency Provider Last Rate   • acetaminophen  650 mg Oral Q6H PRN TIFFANY BradleyC     • ascorbic acid  500 mg Oral BID TIFFANY BradleyC     • aspirin  81 mg Oral Daily [place], [place]     • atorvastatin  40 mg Oral QPM TIFFANY BradleyC     • buPROPion  300 mg Oral Daily [place], [place]     • cefTRIAXone  2,000 mg Intravenous Q24H LINNETTE Bradley-C 2,000 mg (08/12/23 2158)   • cholecalciferol  1,000 Units Oral Daily Alice Sadler PA-C     • Diclofenac Sodium  2 g Topical 4x Daily Alice Sadler PA-C     • ferrous sulfate  325 mg Oral Daily With Breakfast TIFFANY BradleyC     • heparin (porcine)  5,000 Units Subcutaneous [facility], PA-C     • LORazepam  0.5 mg Oral Q6H PRN LINNETTE Bradley-C     • pantoprazole  40 mg Oral Early Morning LINNETTE Bradley-C     • QUEtiapine  25 mg Oral BID Alice Sadler PA-C          Today, Patient Was Seen By: Gay Amor MD    ** Please Note: Dictation voice to text software may have been used in the creation of this document. **    David Madrid MD  8/12/2023 10:55 AM  Signed  [id number] [employer]  Progress Note  Name: Jonathon Neville  MRN: [id number]  Unit/Bed#: -01 I Date of Admission: 8/11/2023   Date of Service: 8/12/2023 I Hospital Day: 1    Assessment/Plan   * Stroke-like symptoms  Assessment & Plan  Symptoms are more consistent with an acute toxic/metabolic encephalopathy, however the patient was admitted for stroke to be ruled out  Patient is currently alert, awake, and oriented to person only, not to place, not to time  Work-up thus far: -   #1. CT brain-1. No acute intracranial CT abnormality. 2.  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 #2.  CTA stroke alert head and neck-1. Patent major vessels of the [place] without high-grade stenosis. No aneurysm. 2.  No hemodynamically significant stenosis or dissection of cervical carotid and vertebral arteries. Normal variant hypoplastic left vertebral artery. 3.  No significant change in 2.8 cm right upper lobe spiculated nodule from chest CT 6/27/2023. Given short interval, this is still suspicious for malignancy. Recommend either PET/CT, close follow-up chest CT (3 months from prior CT 6/27/2023), or   sampling. #3.  MRI brain-pending  #4. Formal neurology evaluation-pending  #5. Fasting lipid profile-reviewed  #6. HbA1c is pending  #7. PT and OT eval is pending  Continue aspirin, and Lipitor at this time for secondary prevention      SIRS (systemic inflammatory response syndrome) (HCC)  Assessment & Plan  Noted by leukocytosis and tachycardia present on admission.   Lactic acid negative  Unclear etiology of infection UA negative leuks nitrate, no acute infectious process on imaging  Patient was started on empiric ceftriaxone-we will continue the same  White blood cell count has improved from 18.02 at time of arrival to 14.70  Procalcitonin testing has improved from 1.24-0.82  We will follow-up on blood cultures    Goals of care, counseling/discussion  Assessment & Plan  Patient is currently alert, awake, and oriented x1 only  Patient has multiple medical comorbid conditions-including a right upper lobe lesion suspicious for a cancer, and infrarenal abdominal aortic aneurysm, generalized deconditioning, chronic emphysema, BMI of 16, and advancing age of 81, and a 2.9 x 2.0 cm structure is seen in the right inguinal canal suspicious of herniated bowel, and a declining quality of life  I reviewed these findings with the patient's daughter Jailene Patrick who is also the healthcare proxy, we reviewed the possibility of a palliative care evaluation, and/or a hospice evaluation  Patient's daughter will be in to visit with her dad after which we will make a decision  In the interim, the patient is a full DNR/DNI  Continue other medical management until final decision making has been completed    Hyponatremia  Assessment & Plan  Hypovolemic hyponatremia that has improved with IV fluid    Elevated creatine kinase  Assessment & Plan  Mild acute kidney injury present on admission  Baseline creatinine is 1-1.2  Arrival creatinine 1.46  Current creatinine 1.13  Continue to monitor    Nodule of right lung  Assessment & Plan  CT: Previously seen right upper lobe spiculated nodule measuring 2.6 x 1.8 cm similar to the prior exam. PET/CT is again recommended. Await family decision regarding how aggressive they would like to be  At baseline, patient would not be a good candidate for any type of chemo and/or radiation therapy in view of his advancing age, comorbid medical conditions, and overall generalized deconditioning            VTE Prophylaxis:  Heparin    Patient Centered Rounds: I have performed bedside rounds with nursing staff today.     Discussions with Specialists or Other Care Team Provider: Case management, nursing  Education and Discussions with Family / Patient: Patient's daughter was brought up to par    Current Length of Stay: 1 day(s)    Current Patient Status: Inpatient   Certification Statement: The patient will continue to require additional inpatient hospital stay due to Need for continued IV fluid, and stroke work-up along with IV antibiotics    Discharge Plan: Discharge planning pending clinical course    Code Status: DNR/DNI level 3    Subjective:   Patient seen, is currently alert, awake, and oriented to person only, he thinks it is 1985 and that he is in [facility], he does not participate in the history portion of this interval progress note    Objective:     Vitals:   Temp (24hrs), Av °F (36.7 °C), Min:97.1 °F (36.2 °C), Max:99.3 °F (37.4 °C)    Temp:  [97.1 °F (36.2 °C)-99.3 °F (37.4 °C)] 97.1 °F (36.2 °C)  HR:  [64-98] 64  Resp:  [16-20] 19  BP: ()/(54-89) 112/62  SpO2:  [93 %-98 %] 98 %  Body mass index is 16.97 kg/m². Input and Output Summary (last 24 hours): Intake/Output Summary (Last 24 hours) at 2023 1053  Last data filed at 2023 2107  Gross per 24 hour   Intake 1000 ml   Output --   Net 1000 ml       Physical Exam:   Physical Exam  Vitals and nursing note reviewed. Constitutional:       General: He is not in acute distress. Appearance: Normal appearance. He is not ill-appearing. HENT:      Head: Normocephalic and atraumatic. Nose: Nose normal.   Eyes:      Extraocular Movements: Extraocular movements intact. Pupils: Pupils are equal, round, and reactive to light. Cardiovascular:      Rate and Rhythm: Normal rate and regular rhythm. Pulses: Normal pulses. Heart sounds: Normal heart sounds. No murmur heard. No friction rub. No gallop. Pulmonary:      Effort: Pulmonary effort is normal.      Breath sounds: Normal breath sounds. Abdominal:      General: There is no distension. Palpations: Abdomen is soft. There is no mass. Tenderness: There is no abdominal tenderness. There is no guarding or rebound. Musculoskeletal:         General: No swelling or tenderness. Normal range of motion. Cervical back: Normal range of motion and neck supple. No rigidity. No muscular tenderness. Right lower leg: No edema. Left lower leg: No edema. Skin:     General: Skin is warm. Capillary Refill: Capillary refill takes less than 2 seconds. Findings: No erythema or rash. Neurological:      General: No focal deficit present. Mental Status: He is alert. Mental status is at baseline. Comments: AAO x1  Follow simple one-step commands   Psychiatric:         Mood and Affect: Mood normal.         Behavior: Behavior normal.         Additional Data:     Labs:    Results from last 7 days   Lab Units 08/12/23  0442   WBC Thousand/uL 14.76*   HEMOGLOBIN g/dL 10.2*   HEMATOCRIT % 32.3*   PLATELETS Thousands/uL 200     Results from last 7 days   Lab Units 08/12/23  0442   SODIUM mmol/L 136   POTASSIUM mmol/L 4.1   CHLORIDE mmol/L 106   CO2 mmol/L 20*   BUN mg/dL 35*   CREATININE mg/dL 1.13   CALCIUM mg/dL 8.4     Results from last 7 days   Lab Units 08/11/23  1920   INR  1.31*     Results from last 7 days   Lab Units 08/11/23  1918   POC GLUCOSE mg/dl 127           * I Have Reviewed All Lab Data Listed Above. * Additional Pertinent Lab Tests Reviewed: [facility] Admission  Reviewed    Imaging:  Imaging Reports Reviewed Today Include: CT brain-no acute pathology, see the remainder of the imaging I reviewed above    Recent Cultures (last 7 days):     Results from last 7 days   Lab Units 08/11/23 1952   BLOOD CULTURE  Received in Microbiology Lab. Culture in Progress.        Last 24 Hours Medication List:   Current Facility-Administered Medications   Medication Dose Route Frequency Provider Last Rate   • ascorbic acid  500 mg Oral BID Jennifer Simon PA-C     • aspirin  81 mg Oral Daily [address] PA-YVETTE     • atorvastatin  40 mg Oral QPM [place] LoopMONTRELL     • buPROPion  300 mg Oral Daily [address] [place], [place]     • cefTRIAXone  2,000 mg Intravenous Q24H [address] [facility], PA-C 2,000 mg (08/11/23 2123)   • heparin (porcine)  5,000 Units Subcutaneous [facility] Cindy Lawton PA-C     • pantoprazole  40 mg Oral Early Morning Russell LoopMONTRELL     • sodium chloride  100 mL/hr Intravenous Continuous [address], PA-C 100 mL/hr (08/12/23 0618)        Today, Patient Was Seen By: Trent Felder MD    ** Please Note: Dictation voice to text software may have been used in the creation of this document.  **

## 2023-08-11 NOTE — ED PROVIDER NOTES
History  Chief Complaint   Patient presents with   • Weakness - Generalized     Vomiting/ generalized bodyaches/ light headed/ dizziness. In and out on consciousness. From the [place]. HPI this an 55-year-old male who presents to the emergency department via EMS. Patient is somewhat responsive upon evaluation unable to provide history. History per EMS is scattered however they state they were called to the [facility] living [facility] for an 81year-old male who was acting "abnormal ". Per EMS they were told that the patient has been becoming more more confused over the last 2 days and started with new onset slurred speech at approximately 09 100 this morning. No additional history was able to be obtained. Prior to Admission Medications   Prescriptions Last Dose Informant Patient Reported? Taking?    Aspirin 81 MG CAPS   Yes No   Sig: Take 81 mg by mouth daily   LORazepam (ATIVAN) 0.5 mg tablet   Yes No   Sig: Take 0.5 mg by mouth every 6 (six) hours as needed for anxiety   PANTOPRAZOLE SODIUM PO   Yes No   Sig: Take 40 mg by mouth in the morning   QUEtiapine (SEROquel) 25 mg tablet   Yes No   Sig: Take 25 mg by mouth 2 (two) times a day   ascorbic acid (VITAMIN C) 250 mg tablet   Yes No   Sig: Take 500 mg by mouth 2 (two) times a day   buPROPion (WELLBUTRIN SR) 150 mg 12 hr tablet   Yes No   Sig: Take 150 mg by mouth 2 (two) times a day   calcium carbonate (OS-URSULA) 600 MG tablet   Yes No   Sig: Take 600 mg by mouth daily   cholecalciferol (VITAMIN D3) 1,000 units tablet   Yes No   Sig: Take 1,000 Units by mouth daily   ferrous sulfate 325 (65 Fe) mg tablet   Yes No   Sig: Take 325 mg by mouth daily with breakfast   levalbuterol (XOPENEX) 1.25 mg/3 mL nebulizer solution   No No   Sig: Take 3 mL (1.25 mg total) by nebulization 3 (three) times a day   loperamide (IMODIUM) 2 mg capsule   No No   Sig: Take 1 capsule (2 mg total) by mouth 4 (four) times a day as needed for diarrhea   loratadine (CLARITIN) 10 mg tablet   Yes No   Sig: Take 10 mg by mouth daily   megestrol (MEGACE) 40 MG/ML suspension   Yes No   Sig: Take 400 mg by mouth 2 (two) times a day   pravastatin (PRAVACHOL) 80 mg tablet   Yes No   Sig: Take 80 mg by mouth daily at bedtime   senna (SENOKOT) 8.6 MG tablet   Yes No   Sig: Take 1 tablet by mouth daily      Facility-Administered Medications: None       Past Medical History:   Diagnosis Date   • Anemia    • Anxiety    • Dementia (HCC)    • Depression    • Hyperlipidemia    • Hypertension        History reviewed. No pertinent surgical history. Family History   Problem Relation Age of Onset   • No Known Problems Mother    • No Known Problems Father      I have reviewed and agree with the history as documented. E-Cigarette/Vaping     E-Cigarette/Vaping Substances     Social History     Tobacco Use   • Smoking status: Former     Types: Cigarettes   • Smokeless tobacco: Never   Substance Use Topics   • Alcohol use: Not Currently   • Drug use: Never       Review of Systems   Unable to perform ROS: Acuity of condition       Physical Exam  Physical Exam  Vitals reviewed. Constitutional:       General: He is in acute distress. Appearance: He is ill-appearing. HENT:      Head: Normocephalic and atraumatic. Right Ear: Tympanic membrane normal.      Left Ear: Tympanic membrane normal.      Nose: Nose normal.   Eyes:      Pupils: Pupils are equal, round, and reactive to light. Neck:      Comments: Severe kyphosis  Cardiovascular:      Rate and Rhythm: Normal rate. Pulses: Normal pulses. Pulmonary:      Effort: Pulmonary effort is normal.      Breath sounds: Normal breath sounds. Abdominal:      General: Abdomen is flat. There is no distension. Palpations: There is no mass. Genitourinary:     Penis: Normal.    Musculoskeletal:      Cervical back: Normal range of motion.       Comments: Unable to move left upper extremity, unable to move bilateral lower extremities Skin:     General: Skin is dry. Capillary Refill: Capillary refill takes 2 to 3 seconds. Coloration: Skin is pale. Skin is not jaundiced. Findings: Bruising present. No erythema, lesion or rash. Neurological:      Mental Status: He is lethargic and confused. GCS: GCS eye subscore is 3. GCS verbal subscore is 4. GCS motor subscore is 5. Motor: Weakness present.          Vital Signs  ED Triage Vitals   Temperature Pulse Respirations Blood Pressure SpO2   08/11/23 1930 08/11/23 1910 08/11/23 1910 08/11/23 1910 08/11/23 1910   97.8 °F (36.6 °C) 98 18 94/54 93 %      Temp Source Heart Rate Source Patient Position - Orthostatic VS BP Location FiO2 (%)   08/11/23 1930 08/11/23 1910 08/11/23 1910 08/11/23 1910 --   Tympanic Monitor Lying Left arm       Pain Score       --                  Vitals:    08/11/23 2035 08/11/23 2040 08/11/23 2041 08/11/23 2045   BP:   153/72 130/56   Pulse: 94 96  95   Patient Position - Orthostatic VS:    Lying         Visual Acuity  Visual Acuity    Flowsheet Row Most Recent Value   L Pupil Size (mm) 3   R Pupil Size (mm) 3          ED Medications  Medications   sodium chloride 0.9 % bolus 1,000 mL (1,000 mL Intravenous New Bag 8/11/23 1921)   iohexol (OMNIPAQUE) 350 MG/ML injection (SINGLE-DOSE) 100 mL (100 mL Intravenous Given 8/11/23 1928)       Diagnostic Studies  Results Reviewed     Procedure Component Value Units Date/Time    Procalcitonin [[id number]]     Lab Status: No result Specimen: Blood     UA w Reflex to Microscopic w Reflex to Culture [[id number]]  (Abnormal) Collected: 08/11/23 2038    Lab Status: Final result Specimen: Urine, Indwelling Montejo Catheter Updated: 08/11/23 2055     Color, UA Yellow     Clarity, UA Hazy     Specific Gravity, UA >=1.030     pH, UA 5.5     Leukocytes, UA Negative     Nitrite, UA Negative     Protein, UA 2+ mg/dl      Glucose, UA Negative mg/dl      Ketones, UA Negative mg/dl      Urobilinogen, UA 0.2 E.U./dl      Bilirubin, UA Negative     Occult Blood, UA Negative    Urine Microscopic [[id number]] Collected: 08/11/23 2038    Lab Status: In process Specimen: Urine, Indwelling Montejo Catheter Updated: 08/11/23 2055    FLU/RSV/COVID - if FLU/RSV clinically relevant [[id number]]  (Normal) Collected: 08/11/23 1920    Lab Status: Final result Specimen: Nares from Nose Updated: 08/11/23 2007     SARS-CoV-2 Negative     INFLUENZA A PCR Negative     INFLUENZA B PCR Negative     RSV PCR Negative    Narrative:      FOR PEDIATRIC PATIENTS - copy/paste COVID Guidelines URL to browser: [email or web address]    SARS-CoV-2 assay is a Nucleic Acid Amplification assay intended for the  qualitative detection of nucleic acid from SARS-CoV-2 in nasopharyngeal  swabs. Results are for the presumptive identification of SARS-CoV-2 RNA. Positive results are indicative of infection with SARS-CoV-2, the virus  causing COVID-19, but do not rule out bacterial infection or co-infection  with other viruses. Laboratories within the [facility] and its  territories are required to report all positive results to the appropriate  public health authorities. Negative results do not preclude SARS-CoV-2  infection and should not be used as the sole basis for treatment or other  patient management decisions. Negative results must be combined with  clinical observations, patient history, and epidemiological information. This test has not been FDA cleared or approved. This test has been authorized by FDA under an Emergency Use Authorization  (EUA). This test is only authorized for the duration of time the  declaration that circumstances exist justifying the authorization of the  emergency use of an in vitro diagnostic tests for detection of SARS-CoV-2  virus and/or diagnosis of COVID-19 infection under section 564(b)(1) of  the Act, 21 U. S.C. 307NAY-5(H)(3), unless the authorization is terminated  or revoked sooner. The test has been validated but independent review by FDA  and CLIA is pending. Test performed using [employer] Gene[employer]pert: This RT-PCR assay targets N2,  a region unique to SARS-CoV-2. A conserved region in the E-gene was chosen  for pan-Sarbecovirus detection which includes SARS-CoV-2. According to CMS-2020-01-R, this platform meets the definition of high-throughput technology. HS Troponin I 4hr [[id number]]     Lab Status: No result Specimen: Blood     TSH [[id number]]  (Normal) Collected: 08/11/23 1920    Lab Status: Final result Specimen: Blood from Arm, Right Updated: 08/11/23 2004     TSH 3RD GENERATON 2.464 uIU/mL     HS Troponin 0hr (reflex protocol) [[id number]]  (Normal) Collected: 08/11/23 1920    Lab Status: Final result Specimen: Blood from Arm, Right Updated: 08/11/23 1956     hs TnI 0hr 17 ng/L     HS Troponin I 2hr [[id number]]     Lab Status: No result Specimen: Blood     Blood culture #1 [[id number]] Collected: 08/11/23 1952    Lab Status:  In process Specimen: Blood Updated: 08/11/23 4375    Basic metabolic panel [[id number]]  (Abnormal) Collected: 08/11/23 1920    Lab Status: Final result Specimen: Blood from Arm, Right Updated: 08/11/23 1952     Sodium 133 mmol/L      Potassium 4.4 mmol/L      Chloride 101 mmol/L      CO2 20 mmol/L      ANION GAP 12 mmol/L      BUN 46 mg/dL      Creatinine 1.46 mg/dL      Glucose 136 mg/dL      Calcium 10.0 mg/dL      eGFR 42 ml/min/1.73sq m     Narrative:      [place] guidelines for Chronic Kidney Disease (CKD):   •  Stage 1 with normal or high GFR (GFR > 90 mL/min/1.73 square meters)  •  Stage 2 Mild CKD (GFR = 60-89 mL/min/1.73 square meters)  •  Stage 3A Moderate CKD (GFR = 45-59 mL/min/1.73 square meters)  •  Stage 3B Moderate CKD (GFR = 30-44 mL/min/1.73 square meters)  •  Stage 4 Severe CKD (GFR = 15-29 mL/min/1.73 square meters)  •  Stage 5 End Stage CKD (GFR <15 mL/min/1.73 square meters)  Note: GFR calculation is accurate only with a steady state creatinine    Lipase [[id number]]  (Normal) Collected: 08/11/23 1920    Lab Status: Final result Specimen: Blood from Arm, Right Updated: 08/11/23 1952     Lipase 15 u/L     Lactic acid, plasma (w/reflex if result > 2.0) [[id number]]  (Normal) Collected: 08/11/23 1920    Lab Status: Final result Specimen: Blood from Arm, Right Updated: 08/11/23 1951     LACTIC ACID 1.9 mmol/L     Narrative:      Result may be elevated if tourniquet was used during collection. APTT [[id number]]  (Normal) Collected: 08/11/23 1920    Lab Status: Final result Specimen: Blood from Arm, Right Updated: 08/11/23 1945     PTT 31 seconds     Protime-INR [[id number]]  (Abnormal) Collected: 08/11/23 1920    Lab Status: Final result Specimen: Blood from Arm, Right Updated: 08/11/23 1945     Protime 16.4 seconds      INR 1.31    CBC and Platelet [[id number]]  (Abnormal) Collected: 08/11/23 1920    Lab Status: Final result Specimen: Blood from Arm, Right Updated: 08/11/23 1930     WBC 18.02 Thousand/uL      RBC 4.09 Million/uL      Hemoglobin 12.9 g/dL      Hematocrit 40.0 %      MCV 98 fL      MCH 31.5 pg      MCHC 32.3 g/dL      RDW 14.3 %      Platelets 738 Thousands/uL      MPV 9.5 fL     Blood culture #2 [[id number]] Collected: 08/11/23 1920    Lab Status: No result Specimen: Blood from Arm, Right     Fingerstick Glucose (POCT) [[id number]]  (Normal) Collected: 08/11/23 1918    Lab Status: Final result Updated: 08/11/23 1919     POC Glucose 127 mg/dl                  CTA stroke alert (head/neck)   Final Result by Gerhardt Sabina, MD (08/11 2046)      1. Patent major vessels of the [place] without high-grade stenosis. No aneurysm. 2.  No hemodynamically significant stenosis or dissection of cervical carotid and vertebral arteries. Normal variant hypoplastic left vertebral artery. 3.  No significant change in 2.8 cm right upper lobe spiculated nodule from chest CT 6/27/2023.  Given short interval, this is still suspicious for malignancy. Recommend either PET/CT, close follow-up chest CT (3 months from prior CT 6/27/2023), or    sampling. Findings were directly discussed with Carmen Collazo at 7:45 p.m. This study was marked in [facility] for notification and follow-up. Workstation performed: [id number]         CT chest abdomen pelvis w contrast   Final Result by Aurelia Seip, MD (08/11 2030)      Moderate diffuse emphysematous changes. Bibasilar dependent reticular changes are noted. Persistent visualization of the previously ill-defined normal seen paraseptal emphysematous changes are noted. Previously seen right upper lobe spiculated nodule    measuring 2.6 x 1.8 cm similar to the prior exam. PET/CT is again recommended. .      5 mm calcification in the upper pole the right kidney possibly nonobstructing stone or parenchymal calcification. .    Nonobstructing stone in the left renal collecting system and renal pelvis measuring up to 7 mm. No hydronephrosis         2.9 x 2.0 cm structure is seen in the right inguinal canal; I suspect this is herniated bowel versus ascitic fluid. Streaking from the left hip arthroplasty limits evaluation. No evidence for obstruction or incarceration. Consider oral contrast exam    prior to any potential future intervention (surgery) in this region. .      Infrarenal abdominal aortic ectasia with eccentric thrombus measuring up to 3.3 x 3.0 cm. The study was marked in [facility] for immediate notification. Workstation performed: [id number]         CT stroke alert brain   Final Result by Jayde Corbin MD (08/11 1952)      1. No acute intracranial CT abnormality. 2.  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 Findings were directly discussed with Carmen Collazo at 7:45 p.m.          Workstation performed: [id number]                    Procedures  ECG 12 Lead Documentation Only    Date/Time: 8/11/2023 8:34 PM    Performed by: Rony Chandra MD  Authorized by: Rony Chandra MD    Indications / Diagnosis:  Altered mental status  Patient location:  ED  Previous ECG:     Previous ECG:  Compared to current  Interpretation:     Interpretation: abnormal    Rate:     ECG rate:  95  Rhythm:     Rhythm: sinus rhythm    QRS:     QRS axis:  Left    QRS intervals:  Normal  Conduction:     Conduction: abnormal      Abnormal conduction: 1st degree      CriticalCare Time    Date/Time: 8/11/2023 9:01 PM    Performed by: Rony Chandra MD  Authorized by: Rony Chandra MD    Critical care provider statement:     Critical care time (minutes):  90    Critical care time was exclusive of:  Separately billable procedures and treating other patients and teaching time    Critical care was necessary to treat or prevent imminent or life-threatening deterioration of the following conditions:  CNS failure or compromise, shock and renal failure    Critical care was time spent personally by me on the following activities:  Blood draw for specimens, obtaining history from patient or surrogate, discussions with consultants, examination of patient, development of treatment plan with patient or surrogate, ordering and performing treatments and interventions, ordering and review of laboratory studies, ordering and review of radiographic studies, re-evaluation of patient's condition and review of old charts    I assumed direction of critical care for this patient from another provider in my specialty: no               ED Course  ED Course as of 08/11/23 2104   Fri Aug 11, 2023   1918 Spoke with stroke neurologist Dr. Dayton Caruso, case discussed. 1935 WBC(!): 18.02   1940 Spoke with patient's son Jake Garza who confirmed that patient is DNR DNI I discussed the patient's critical status at this time with patient's son son endorses the patient is normally confused at baseline but is able to hold a conversation. 1947 POC Glucose: [address](!): 16.4   1947 Blood pressure improved after normal saline bolus. 1956 Sodium(!): 133   1956 Creatinine(!): 1.46   1956 Spoke with neurology recommending we give 25 mg aspirin pursue infectious etiology admit with follow-up MRI   2032 CT findings reviewed. 2057 Patient reevaluated no changes from initial physical examination. All labs appendix with no clear source of leukocytosis nonetheless we will admit the patient for follow-up MRI and further work-up of transient encephalopathy and leukocytosis. Tiger text sent to on-call hospitalist.                  Stroke Assessment     Row Name 08/11/23 1912             NIH Stroke Scale    Interval --      Level of Consciousness (1a.) 1      LOC Questions (1b.) 0      LOC Commands (1c.) 2      Best Gaze (2.) 1      Visual (3.) 0      Facial Palsy (4.) 0      Motor Arm, Left (5a.) 3      Motor Arm, Right (5b.) 4      Motor Leg, Left (6a.) 3      Motor Leg, Right (6b.) 3      Limb Ataxia (7.) 0      Sensory (8.) 0      Best Language (9.) 2      Dysarthria (10.) 0      Extinction and Inattention (11.) (Formerly Neglect) 0      Total 19                            SBIRT 20yo+    Flowsheet Row Most Recent Value   Initial Alcohol Screen: US AUDIT-C     1. How often do you have a drink containing alcohol? 0 Filed at: 08/11/2023 1922   2. How many drinks containing alcohol do you have on a typical day you are drinking? 0 Filed at: 08/11/2023 1922   3a. Male UNDER 65: How often do you have five or more drinks on one occasion? 0 Filed at: 08/11/2023 1922   3b. FEMALE Any Age, or MALE 65+: How often do you have 4 or more drinks on one occassion? 0 Filed at: 08/11/2023 1922   Audit-C Score 0 Filed at: 08/11/2023 1922   CRISTINO: How many times in the past year have you. .. Used an illegal drug or used a prescription medication for non-medical reasons?  Never Filed at: 08/11/2023 [id number] [facility] Making  46-year-old male who presents to the emergency department in acute distress with unclear history. Patient is lethargic responsive to painful stimuli he has obvious left-sided neurological deficits and bilateral lower extremity deficits. He was also noted to be borderline hypotensive upon arrival.  Extremely broad differential however most likely be strokelike symptoms versus intracranial hemorrhage. Given concerns for low blood pressure also pursue sepsis work-up and encephalopathic work-up. Fortunately stroke work-up was negative however patient remained lethargic despite fluid bolus and labs revealed a significant leukocytosis of unknown origin. After discussion with stroke radiologist/neurologist we recommend admission on stroke pathway with repeat MRI in the morning as well as treatment of the encephalopathy which likely is metabolic/infectious in nature. Labs revealed NANCY leukocytosis. Patient ultimately required admission for subsequent treatment. CODE STATUS was discussed with patient's son who states patient is a DNR/DNI. Acute kidney injury [facility]): acute illness or injury  Encephalopathy: acute illness or injury  Fatigue: acute illness or injury  Hyponatremia: acute illness or injury  Leukocytosis: acute illness or injury  Stroke-like symptom: acute illness or injury  Amount and/or Complexity of Data Reviewed  Labs: ordered. Decision-making details documented in ED Course. Radiology: ordered.           Disposition  Final diagnoses:   Stroke-like symptom   Fatigue   Acute kidney injury ([address])   Leukocytosis   Encephalopathy   Hyponatremia     Time reflects when diagnosis was documented in both MDM as applicable and the Disposition within this note     Time User Action Codes Description Comment    8/11/2023  7:12 PM Leeann Hinders Add [R29.90] Stroke-like symptom     8/11/2023  8:31 PM Leeann Hinders Add [R53.83] Fatigue     8/11/2023  8:31 PM Leeann Hinders Add [N17.9] Acute kidney injury ([address])     8/11/2023  8:31 PM Parish Valadez Add [D83.213] Leukocytosis     8/11/2023  8:31 PM Parish Valadez Add [G93.40] Encephalopathy     8/11/2023  8:31 PM Parish Valadez Modify [R29.90] Stroke-like symptom     8/11/2023  8:31 PM Parish Valadez Modify [G93.40] Encephalopathy     8/11/2023  8:32 PM Sahil Ferreira Add [E87.1] Hyponatremia       ED Disposition     None      Follow-up Information    None         Patient's Medications   Discharge Prescriptions    No medications on file       No discharge procedures on file.     PDMP Review     None          ED Provider  Electronically Signed by           Parish Valadez MD  08/11/23 3997

## 2023-08-12 ENCOUNTER — HOME CARE VISIT (OUTPATIENT)
Dept: HOME HEALTH SERVICES | Facility: HOME HEALTHCARE | Age: 88
End: 2023-08-12
Payer: MEDICARE

## 2023-08-12 PROBLEM — Z71.89 GOALS OF CARE, COUNSELING/DISCUSSION: Status: ACTIVE | Noted: 2023-01-01

## 2023-08-12 LAB
4HR DELTA HS TROPONIN: 2 NG/L
ANION GAP SERPL CALCULATED.3IONS-SCNC: 10 MMOL/L
ATRIAL RATE: 95 BPM
BUN SERPL-MCNC: 35 MG/DL (ref 5–25)
CALCIUM SERPL-MCNC: 8.4 MG/DL (ref 8.4–10.2)
CARDIAC TROPONIN I PNL SERPL HS: 19 NG/L
CHLORIDE SERPL-SCNC: 106 MMOL/L (ref 96–108)
CHOLEST SERPL-MCNC: 57 MG/DL
CO2 SERPL-SCNC: 20 MMOL/L (ref 21–32)
CREAT SERPL-MCNC: 1.13 MG/DL (ref 0.6–1.3)
ERYTHROCYTE [DISTWIDTH] IN BLOOD BY AUTOMATED COUNT: 14.3 % (ref 11.6–15.1)
GFR SERPL CREATININE-BSD FRML MDRD: 58 ML/MIN/1.73SQ M
GLUCOSE SERPL-MCNC: 96 MG/DL (ref 65–140)
HCT VFR BLD AUTO: 32.3 % (ref 36.5–49.3)
HDLC SERPL-MCNC: 24 MG/DL
HGB BLD-MCNC: 10.2 G/DL (ref 12–17)
LDLC SERPL CALC-MCNC: 21 MG/DL (ref 0–100)
MCH RBC QN AUTO: 31.7 PG (ref 26.8–34.3)
MCHC RBC AUTO-ENTMCNC: 31.6 G/DL (ref 31.4–37.4)
MCV RBC AUTO: 100 FL (ref 82–98)
P AXIS: 66 DEGREES
PLATELET # BLD AUTO: 200 THOUSANDS/UL (ref 149–390)
PMV BLD AUTO: 9.6 FL (ref 8.9–12.7)
POTASSIUM SERPL-SCNC: 4.1 MMOL/L (ref 3.5–5.3)
PR INTERVAL: 212 MS
PROCALCITONIN SERPL-MCNC: 0.82 NG/ML
QRS AXIS: -4 DEGREES
QRSD INTERVAL: 76 MS
QT INTERVAL: 406 MS
QTC INTERVAL: 510 MS
RBC # BLD AUTO: 3.22 MILLION/UL (ref 3.88–5.62)
SODIUM SERPL-SCNC: 136 MMOL/L (ref 135–147)
T WAVE AXIS: 41 DEGREES
TRIGL SERPL-MCNC: 58 MG/DL
VENTRICULAR RATE: 95 BPM
WBC # BLD AUTO: 14.76 THOUSAND/UL (ref 4.31–10.16)

## 2023-08-12 PROCEDURE — 93010 ELECTROCARDIOGRAM REPORT: CPT | Performed by: INTERNAL MEDICINE

## 2023-08-12 PROCEDURE — 80048 BASIC METABOLIC PNL TOTAL CA: CPT | Performed by: PHYSICIAN ASSISTANT

## 2023-08-12 PROCEDURE — 83036 HEMOGLOBIN GLYCOSYLATED A1C: CPT | Performed by: PHYSICIAN ASSISTANT

## 2023-08-12 PROCEDURE — 84145 PROCALCITONIN (PCT): CPT | Performed by: PHYSICIAN ASSISTANT

## 2023-08-12 PROCEDURE — 80061 LIPID PANEL: CPT | Performed by: PHYSICIAN ASSISTANT

## 2023-08-12 PROCEDURE — 99232 SBSQ HOSP IP/OBS MODERATE 35: CPT | Performed by: HOSPITALIST

## 2023-08-12 PROCEDURE — 84484 ASSAY OF TROPONIN QUANT: CPT | Performed by: STUDENT IN AN ORGANIZED HEALTH CARE EDUCATION/TRAINING PROGRAM

## 2023-08-12 PROCEDURE — 85027 COMPLETE CBC AUTOMATED: CPT | Performed by: PHYSICIAN ASSISTANT

## 2023-08-12 RX ORDER — MELATONIN
1000 DAILY
Status: DISCONTINUED | OUTPATIENT
Start: 2023-08-13 | End: 2023-08-14 | Stop reason: HOSPADM

## 2023-08-12 RX ORDER — FERROUS SULFATE 325(65) MG
325 TABLET ORAL
Status: DISCONTINUED | OUTPATIENT
Start: 2023-08-13 | End: 2023-08-14 | Stop reason: HOSPADM

## 2023-08-12 RX ORDER — QUETIAPINE FUMARATE 25 MG/1
25 TABLET, FILM COATED ORAL 2 TIMES DAILY
Status: DISCONTINUED | OUTPATIENT
Start: 2023-08-12 | End: 2023-08-14 | Stop reason: HOSPADM

## 2023-08-12 RX ORDER — LORAZEPAM 0.5 MG/1
0.5 TABLET ORAL EVERY 6 HOURS PRN
Status: DISCONTINUED | OUTPATIENT
Start: 2023-08-12 | End: 2023-08-14 | Stop reason: HOSPADM

## 2023-08-12 RX ADMIN — ATORVASTATIN CALCIUM 40 MG: 40 TABLET, FILM COATED ORAL at 18:25

## 2023-08-12 RX ADMIN — OXYCODONE HYDROCHLORIDE AND ACETAMINOPHEN 500 MG: 500 TABLET ORAL at 09:30

## 2023-08-12 RX ADMIN — PANTOPRAZOLE SODIUM 40 MG: 40 TABLET, DELAYED RELEASE ORAL at 05:40

## 2023-08-12 RX ADMIN — HEPARIN SODIUM 5000 UNITS: 5000 INJECTION INTRAVENOUS; SUBCUTANEOUS at 05:40

## 2023-08-12 RX ADMIN — SODIUM CHLORIDE 100 ML/HR: 0.9 INJECTION, SOLUTION INTRAVENOUS at 06:18

## 2023-08-12 RX ADMIN — BUPROPION HYDROCHLORIDE 300 MG: 150 TABLET, EXTENDED RELEASE ORAL at 09:30

## 2023-08-12 RX ADMIN — ASPIRIN 81 MG CHEWABLE TABLET 81 MG: 81 TABLET CHEWABLE at 09:30

## 2023-08-12 RX ADMIN — HEPARIN SODIUM 5000 UNITS: 5000 INJECTION INTRAVENOUS; SUBCUTANEOUS at 21:57

## 2023-08-12 RX ADMIN — CEFTRIAXONE 2000 MG: 2 INJECTION, SOLUTION INTRAVENOUS at 21:58

## 2023-08-12 RX ADMIN — QUETIAPINE FUMARATE 25 MG: 25 TABLET ORAL at 21:58

## 2023-08-12 RX ADMIN — OXYCODONE HYDROCHLORIDE AND ACETAMINOPHEN 500 MG: 500 TABLET ORAL at 21:58

## 2023-08-12 RX ADMIN — SODIUM CHLORIDE, SODIUM GLUCONATE, SODIUM ACETATE, POTASSIUM CHLORIDE AND MAGNESIUM CHLORIDE 1000 ML: 526; 502; 368; 37; 30 INJECTION, SOLUTION INTRAVENOUS at 01:30

## 2023-08-12 NOTE — ASSESSMENT & PLAN NOTE
Consent from [facility] care facility with altered mental status, left-sided weakness and slurred speech. He was hypotensive and encephalopathic in the ED.   Reviewed with neurology and recommended for stroke work-up  · Stroke pathway  · CT with no acute findings

## 2023-08-12 NOTE — PROGRESS NOTES
[address]  Progress Note  Name: Nicky Vo I  MRN: [id number]  Unit/Bed#: -01 I Date of Admission: 8/11/2023   Date of Service: 8/12/2023 I Hospital Day: 1    Assessment/Plan   * Stroke-like symptoms  Assessment & Plan  · Symptoms are more consistent with an acute toxic/metabolic encephalopathy, however the patient was admitted for stroke to be ruled out  · Patient is currently alert, awake, and oriented to person only, not to place, not to time  · Work-up thus far: -   · #1. CT brain-1.  No acute intracranial CT abnormality. 2.  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 · #2. CTA stroke alert head and neck-1.  Patent major vessels of the [place] without high-grade stenosis.  No aneurysm. 2.  No hemodynamically significant stenosis or dissection of cervical carotid and vertebral arteries. Normal variant hypoplastic left vertebral artery. 3.  No significant change in 2.8 cm right upper lobe spiculated nodule from chest CT 6/27/2023. Given short interval, this is still suspicious for malignancy. Recommend either PET/CT, close follow-up chest CT (3 months from prior CT 6/27/2023), or   sampling. · #3. MRI brain-pending  · #4. Formal neurology evaluation-pending  · #5. Fasting lipid profile-reviewed  · #6. HbA1c is pending  · #7. PT and OT eval is pending  · Continue aspirin, and Lipitor at this time for secondary prevention      SIRS (systemic inflammatory response syndrome) (HCC)  Assessment & Plan  · Noted by leukocytosis and tachycardia present on admission.   Lactic acid negative  · Unclear etiology of infection UA negative leuks nitrate, no acute infectious process on imaging  · Patient was started on empiric ceftriaxone-we will continue the same  · White blood cell count has improved from 18.02 at time of arrival to 14.70  · Procalcitonin testing has improved from 1.24-0.82  · We will follow-up on blood cultures    Goals of care, counseling/discussion  Assessment & Plan  · Patient is currently alert, awake, and oriented x1 only  · Patient has multiple medical comorbid conditions-including a right upper lobe lesion suspicious for a cancer, and infrarenal abdominal aortic aneurysm, generalized deconditioning, chronic emphysema, BMI of 16, and advancing age of 81, and a 2.9 x 2.0 cm structure is seen in the right inguinal canal suspicious of herniated bowel, and a declining quality of life  · I reviewed these findings with the patient's daughter Henrique Mensah who is also the healthcare proxy, we reviewed the possibility of a palliative care evaluation, and/or a hospice evaluation  · Patient's daughter will be in to visit with her dad after which we will make a decision  · In the interim, the patient is a full DNR/DNI  · Continue other medical management until final decision making has been completed    Hyponatremia  Assessment & Plan  · Hypovolemic hyponatremia that has improved with IV fluid    Elevated creatine kinase  Assessment & Plan  · Mild acute kidney injury present on admission  · Baseline creatinine is 1-1.2  · Arrival creatinine 1.46  · Current creatinine 1.13  · Continue to monitor    Nodule of right lung  Assessment & Plan  · CT: Previously seen right upper lobe spiculated nodule measuring 2.6 x 1.8 cm similar to the prior exam. PET/CT is again recommended. · Await family decision regarding how aggressive they would like to be  · At baseline, patient would not be a good candidate for any type of chemo and/or radiation therapy in view of his advancing age, comorbid medical conditions, and overall generalized deconditioning             VTE Prophylaxis:  Heparin    Patient Centered Rounds: I have performed bedside rounds with nursing staff today.     Discussions with Specialists or Other Care Team Provider: Case management, nursing  Education and Discussions with Family / Patient: Patient's daughter was brought up to par    Current Length of Stay: 1 day(s)    Current Patient Status: Inpatient   Certification Statement: The patient will continue to require additional inpatient hospital stay due to Need for continued IV fluid, and stroke work-up along with IV antibiotics    Discharge Plan: Discharge planning pending clinical course    Code Status: DNR/DNI level 3    Subjective:   Patient seen, is currently alert, awake, and oriented to person only, he thinks it is 1985 and that he is in [facility], he does not participate in the history portion of this interval progress note    Objective:     Vitals:   Temp (24hrs), Av °F (36.7 °C), Min:97.1 °F (36.2 °C), Max:99.3 °F (37.4 °C)    Temp:  [97.1 °F (36.2 °C)-99.3 °F (37.4 °C)] 97.1 °F (36.2 °C)  HR:  [64-98] 64  Resp:  [16-20] 19  BP: ()/(54-89) 112/62  SpO2:  [93 %-98 %] 98 %  Body mass index is 16.97 kg/m². Input and Output Summary (last 24 hours): Intake/Output Summary (Last 24 hours) at 2023 1053  Last data filed at 2023 2107  Gross per 24 hour   Intake 1000 ml   Output --   Net 1000 ml       Physical Exam:   Physical Exam  Vitals and nursing note reviewed. Constitutional:       General: He is not in acute distress. Appearance: Normal appearance. He is not ill-appearing. HENT:      Head: Normocephalic and atraumatic. Nose: Nose normal.   Eyes:      Extraocular Movements: Extraocular movements intact. Pupils: Pupils are equal, round, and reactive to light. Cardiovascular:      Rate and Rhythm: Normal rate and regular rhythm. Pulses: Normal pulses. Heart sounds: Normal heart sounds. No murmur heard. No friction rub. No gallop. Pulmonary:      Effort: Pulmonary effort is normal.      Breath sounds: Normal breath sounds. Abdominal:      General: There is no distension. Palpations: Abdomen is soft. There is no mass. Tenderness: There is no abdominal tenderness.  There is no guarding or rebound. Musculoskeletal:         General: No swelling or tenderness. Normal range of motion. Cervical back: Normal range of motion and neck supple. No rigidity. No muscular tenderness. Right lower leg: No edema. Left lower leg: No edema. Skin:     General: Skin is warm. Capillary Refill: Capillary refill takes less than 2 seconds. Findings: No erythema or rash. Neurological:      General: No focal deficit present. Mental Status: He is alert. Mental status is at baseline. Comments: AAO x1  Follow simple one-step commands   Psychiatric:         Mood and Affect: Mood normal.         Behavior: Behavior normal.         Additional Data:     Labs:    Results from last 7 days   Lab Units 08/12/23  0442   WBC Thousand/uL 14.76*   HEMOGLOBIN g/dL 10.2*   HEMATOCRIT % 32.3*   PLATELETS Thousands/uL 200     Results from last 7 days   Lab Units 08/12/23  0442   SODIUM mmol/L 136   POTASSIUM mmol/L 4.1   CHLORIDE mmol/L 106   CO2 mmol/L 20*   BUN mg/dL 35*   CREATININE mg/dL 1.13   CALCIUM mg/dL 8.4     Results from last 7 days   Lab Units 08/11/23  1920   INR  1.31*     Results from last 7 days   Lab Units 08/11/23  1918   POC GLUCOSE mg/dl 127           * I Have Reviewed All Lab Data Listed Above. * Additional Pertinent Lab Tests Reviewed: All [facility] Admission  Reviewed    Imaging:  Imaging Reports Reviewed Today Include: CT brain-no acute pathology, see the remainder of the imaging I reviewed above    Recent Cultures (last 7 days):     Results from last 7 days   Lab Units 08/11/23 1952   BLOOD CULTURE  Received in Microbiology Lab. Culture in Progress.        Last 24 Hours Medication List:   Current Facility-Administered Medications   Medication Dose Route Frequency Provider Last Rate   • ascorbic acid  500 mg Oral BID Charmaine Leonard PA-C     • aspirin  81 mg Oral Daily Lydia Rivera     • atorvastatin  40 mg Oral QPM Devi Jerez MONTRELL Kuhn     • buPROPion  300 mg Oral Daily Lennox Aly, PA-C     • cefTRIAXone  2,000 mg Intravenous Q24H Lennox Aly, PA-C 2,000 mg (08/11/23 2123)   • heparin (porcine)  5,000 Units Subcutaneous [facility] Cindy Lawton PA-C     • pantoprazole  40 mg Oral Early Morning Lennox Aly, PA-C     • sodium chloride  100 mL/hr Intravenous Continuous Lennox Aly, PA-C 100 mL/hr (08/12/23 0618)        Today, Patient Was Seen By: Kezia Saenz MD    ** Please Note: Dictation voice to text software may have been used in the creation of this document.  **

## 2023-08-12 NOTE — H&P
[address]  H&P  Name: Roni Pace 81 y.o. male I MRN: [id number]  Unit/Bed#: -01 I Date of Admission: 8/11/2023   Date of Service: 8/11/2023 I Hospital Day: 0      Assessment/Plan   * Stroke-like symptoms  Assessment & Plan  Consent from personal care facility with altered mental status, left-sided weakness and slurred speech. He was hypotensive and encephalopathic in the ED. Reviewed with neurology and recommended for stroke work-up  · Stroke pathway  · CT with no acute findings    SIRS (systemic inflammatory response syndrome) ([address])  Assessment & Plan  Noted by leukocytosis and tachycardia present on admission. Lactic acid negative  Unclear etiology of infection UA negative leuks nitrate, no acute infectious process on imaging  · Follow-up blood cultures  · Check procalcitonin x2  · Start Rocephin  · IV fluid    Elevated creatine kinase  Assessment & Plan  With chronic kidney disease stage IIIa  · Creatinine baseline 1-1.2, currently 1.4  · IV fluids and monitor    Hyponatremia  Assessment & Plan  · Mild, IV fluids  · Monitor labs    Nodule of right lung  Assessment & Plan  CT: Previously seen right upper lobe spiculated nodule measuring 2.6 x 1.8 cm similar to the prior exam. PET/CT is again recommended. VTE Pharmacologic Prophylaxis: VTE Score: 10 High Risk (Score >/= 5) - Pharmacological DVT Prophylaxis Ordered: heparin. Sequential Compression Devices Ordered. Code Status: Level 1 - Full Code for now, needs to be further discussed      Anticipated Length of Stay: Patient will be admitted on an inpatient basis with an anticipated length of stay of greater than 2 midnights secondary to stroke work up, infectious work up.     Total Time Spent on Date of Encounter in care of patient: 75 minutes This time was spent on one or more of the following: performing physical exam; counseling and coordination of care; obtaining or reviewing history; documenting in the medical record; reviewing/ordering tests, medications or procedures; communicating with other healthcare professionals and discussing with patient's family/caregivers. Chief Complaint: altered mental status and weakness    History of Present Illness:  Nils Olszewski is a 81 y.o. male with a PMH of dementia, hypertension, hyperlipidemia, depression, and anxiety who presents with being found down with altered mental status and weakness. Brought in by EMS from personal care facility with report of acting "abnormal" and more confused over the last 2 days he was noted to have some slurred speech starting this morning. Patient with baseline dementia and altered mental status not able to provide any details. Per chart patient down 8 pounds in 3 months. Review of Systems:  Review of Systems   Unable to perform ROS: Mental status change       Past Medical and Surgical History:   Past Medical History:   Diagnosis Date   • Anemia    • Anxiety    • Dementia ([address])    • Depression    • Hyperlipidemia    • Hypertension        History reviewed. No pertinent surgical history. Meds/Allergies:  Prior to Admission medications    Medication Sig Start Date End Date Taking?  Authorizing Provider   ascorbic acid (VITAMIN C) 250 mg tablet Take 500 mg by mouth 2 (two) times a day    Historical Provider, MD   Aspirin 81 MG CAPS Take 81 mg by mouth daily    Historical Provider, MD   buPROPion (WELLBUTRIN SR) 150 mg 12 hr tablet Take 150 mg by mouth 2 (two) times a day    Historical Provider, MD   calcium carbonate (OS-URSULA) 600 MG tablet Take 600 mg by mouth daily    Historical Provider, MD   cholecalciferol (VITAMIN D3) 1,000 units tablet Take 1,000 Units by mouth daily    Historical Provider, MD   ferrous sulfate 325 (65 Fe) mg tablet Take 325 mg by mouth daily with breakfast    Historical Provider, MD   levalbuterol (XOPENEX) 1.25 mg/3 mL nebulizer solution Take 3 mL (1.25 mg total) by nebulization 3 (three) times a day 7/14/23 8/13/23  AMANDA Hines   loperamide (IMODIUM) 2 mg capsule Take 1 capsule (2 mg total) by mouth 4 (four) times a day as needed for diarrhea 6/13/23   Mayra Temple MD   loratadine (CLARITIN) 10 mg tablet Take 10 mg by mouth daily    Historical Provider, MD   LORazepam (ATIVAN) 0.5 mg tablet Take 0.5 mg by mouth every 6 (six) hours as needed for anxiety    Historical Provider, MD   megestrol (MEGACE) 40 MG/ML suspension Take 400 mg by mouth 2 (two) times a day    Historical Provider, MD   PANTOPRAZOLE SODIUM PO Take 40 mg by mouth in the morning    Historical Provider, MD   pravastatin (PRAVACHOL) 80 mg tablet Take 80 mg by mouth daily at bedtime    Historical Provider, MD   QUEtiapine (SEROquel) 25 mg tablet Take 25 mg by mouth 2 (two) times a day    Historical Provider, MD   senna (SENOKOT) 8.6 MG tablet Take 1 tablet by mouth daily    Historical Provider, MD WOLF have reviewed home medications using recent Epic encounter. Allergies: No Known Allergies    Social History:  Marital Status:     Occupation: retired  Patient Pre-hospital Living Situation: Assisted Living  Patient Pre-hospital Level of Mobility: walks with walker  Patient Pre-hospital Diet Restrictions: None  Substance Use History:   Social History     Substance and Sexual Activity   Alcohol Use Not Currently     Social History     Tobacco Use   Smoking Status Former   • Types: Cigarettes   Smokeless Tobacco Never     Social History     Substance and Sexual Activity   Drug Use Never       Family History:  Family History   Problem Relation Age of Onset   • No Known Problems Mother    • No Known Problems Father        Physical Exam:     Vitals:   Blood Pressure: 132/83 (08/11/23 2154)  Pulse: 90 (08/11/23 2154)  Temperature: 97.6 °F (36.4 °C) (08/11/23 2154)  Temp Source: Oral (08/11/23 2145)  Respirations: 20 (08/11/23 2154)  Height: 5' 11" (180.3 cm) (08/11/23 2145)  Weight - Scale: 55.2 kg (121 lb 11.1 oz) (08/11/23 2145)  SpO2: 96 % (08/11/23 3094)    Physical Exam  Vitals reviewed. Constitutional:       General: He is not in acute distress. Comments: Chronically ill-appearing elderly  male with temporal wasting, periorbital fat loss, prominent clavicles, ribs and spine, muscle and fat loss of extremities   HENT:      Head: Normocephalic and atraumatic. Right Ear: External ear normal.      Left Ear: External ear normal.      Nose: Nose normal.      Mouth/Throat:      Mouth: Mucous membranes are dry. Eyes:      General:         Right eye: No discharge. Left eye: No discharge. Conjunctiva/sclera: Conjunctivae normal.      Comments: Glasses in place   Cardiovascular:      Rate and Rhythm: Normal rate and regular rhythm. Heart sounds: Normal heart sounds. No murmur heard. Pulmonary:      Effort: Pulmonary effort is normal. No respiratory distress. Breath sounds: Normal breath sounds. No wheezing or rales. Abdominal:      General: Bowel sounds are normal. There is no distension. Palpations: Abdomen is soft. Tenderness: There is no abdominal tenderness. There is no guarding. Musculoskeletal:         General: Normal range of motion. Cervical back: Normal range of motion. Comments: Lower extremity and Venodyne   Skin:     General: Skin is warm and dry. Coloration: Skin is pale. Comments: Positive skin tear left elbow, positive scattered bruising   Neurological:      Comments: Patient mumbled a few words, did follow directions tongue midline, was able to raise both upper and lower extremities and hold them.    strength equal bilateral.   Psychiatric:      Comments: Baseline dementia            Additional Data:     Lab Results:  Results from last 7 days   Lab Units 08/11/23  1920   WBC Thousand/uL 18.02*   HEMOGLOBIN g/dL 12.9   HEMATOCRIT % 40.0   PLATELETS Thousands/uL 238     Results from last 7 days   Lab Units 08/11/23  1920   SODIUM mmol/L 133*   POTASSIUM mmol/L 4.4   CHLORIDE mmol/L 101   CO2 mmol/L 20*   BUN mg/dL 46*   CREATININE mg/dL 1.46*   ANION GAP mmol/L 12   CALCIUM mg/dL 10.0   GLUCOSE RANDOM mg/dL 136     Results from last 7 days   Lab Units 08/11/23  1920   INR  1.31*     Results from last 7 days   Lab Units 08/11/23  1918   POC GLUCOSE mg/dl 127         Results from last 7 days   Lab Units 08/11/23  1920   LACTIC ACID mmol/L 1.9   PROCALCITONIN ng/ml 1.24*       Lines/Drains:  Invasive Devices     Peripheral Intravenous Line  Duration           Peripheral IV 08/11/23 Right Forearm <1 day          Drain  Duration           External Urinary Catheter Medium 31 days    Urethral Catheter Straight-tip 18 Fr. <1 day              Urinary Catheter:  Goal for removal: d/c in AM w/ altered mental status, placed in ED      Imaging: Reviewed radiology reports from this admission including: chest CT scan, abdominal/pelvic CT and CT head  CTA stroke alert (head/neck)   Final Result by Denia Oliveira MD (08/11 2046)      1. Patent major vessels of the [place] of [place] without high-grade stenosis. No aneurysm. 2.  No hemodynamically significant stenosis or dissection of cervical carotid and vertebral arteries. Normal variant hypoplastic left vertebral artery. 3.  No significant change in 2.8 cm right upper lobe spiculated nodule from chest CT 6/27/2023. Given short interval, this is still suspicious for malignancy. Recommend either PET/CT, close follow-up chest CT (3 months from prior CT 6/27/2023), or    sampling. Findings were directly discussed with Katarina Jalloh at 7:45 p.m. This study was marked in [facility] for notification and follow-up. Workstation performed: [id number]         CT chest abdomen pelvis w contrast   Final Result by Socrates Roach MD (08/11 2030)      Moderate diffuse emphysematous changes. Bibasilar dependent reticular changes are noted.  Persistent visualization of the previously ill-defined normal seen paraseptal emphysematous changes are noted. Previously seen right upper lobe spiculated nodule    measuring 2.6 x 1.8 cm similar to the prior exam. PET/CT is again recommended. .      5 mm calcification in the upper pole the right kidney possibly nonobstructing stone or parenchymal calcification. .    Nonobstructing stone in the left renal collecting system and renal pelvis measuring up to 7 mm. No hydronephrosis         2.9 x 2.0 cm structure is seen in the right inguinal canal; I suspect this is herniated bowel versus ascitic fluid. Streaking from the left hip arthroplasty limits evaluation. No evidence for obstruction or incarceration. Consider oral contrast exam    prior to any potential future intervention (surgery) in this region. .      Infrarenal abdominal aortic ectasia with eccentric thrombus measuring up to 3.3 x 3.0 cm. The study was marked in [facility] for immediate notification. Workstation performed: [id number]         CT stroke alert brain   Final Result by Arabella Roberto MD (08/11 1952)      1. No acute intracranial CT abnormality. 2.  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 Findings were directly discussed with Marilia Spence at 7:45 p.m. Workstation performed: [id number]         MRI Inpatient Order    (Results Pending)       EKG and Other Studies Reviewed on Admission:   · EKG: Not available to review in muse. ** Please Note: This note has been constructed using a voice recognition system.  **

## 2023-08-12 NOTE — Clinical Note
Seeking approval for EFREN Mccauley [birth year] Pt is an 80 YO male currently at [address] Dxs: Nodule to R Lung, SIRS, Hyponatremia, Stroke like symptoms, emphysema, Dementia. Found down with altered mental status and weakness. Patient with baseline dementia and altered mental status not able to provide any details. Patient has multiple medical comorbid conditions–including a right upper lobe lesion suspicious for a cancer, and infrarenal abdominal aortic aneurysm, generalized deconditioning, chronic emphysema, BMI of 16, and advancing age of 81, and a 2.9 x 2.0 cm structure is seen in the right inguinal canal suspicious of herniated bowel, and a declining quality of life. Has lost 8 pounds in 3 months. Has temporal wasting, periorbital fat loss, prominent clavicles, ribs and spine, muscle and fat loss of extremities Pt has been taken to the ER once a month since May 2023 for Falls, sepsis, stroke like symptoms. Family requesting hospice back at [facility].  PPS 50%

## 2023-08-12 NOTE — ASSESSMENT & PLAN NOTE
· Mild acute kidney injury present on admission  · Baseline creatinine is 1-1.2  · Arrival creatinine 1.46  · Current creatinine 1.13  · Continue to monitor

## 2023-08-12 NOTE — PLAN OF CARE
Problem: INFECTION - ADULT  Goal: Absence or prevention of progression during hospitalization  Description: INTERVENTIONS:  - Assess and monitor for signs and symptoms of infection  - Monitor lab/diagnostic results  - Monitor all insertion sites, i.e. indwelling lines, tubes, and drains  - Monitor endotracheal if appropriate and nasal secretions for changes in amount and color  - [place] appropriate cooling/warming therapies per order  - Administer medications as ordered  - Instruct and encourage patient and family to use good hand hygiene technique  - Identify and instruct in appropriate isolation precautions for identified infection/condition  Outcome: Progressing  Goal: Absence of fever/infection during neutropenic period  Description: INTERVENTIONS:  - Monitor WBC    Outcome: Progressing

## 2023-08-12 NOTE — CASE MANAGEMENT
Case Management Assessment & Discharge Planning Note    Patient name Chandler Marrero  Location /-08 MRN [id number]  : 1935 Date 2023       Current Admission Date: 2023  Current Admission Diagnosis:Stroke-like symptoms   Patient Active Problem List    Diagnosis Date Noted   • Goals of care, counseling/discussion 2023   • Stroke-like symptoms 2023   • Elevated creatine kinase 2023   • Hyponatremia 2023   • Nodule of right lung 07/10/2023   • SIRS (systemic inflammatory response syndrome) ([address]) 07/10/2023      LOS (days): 1  Geometric Mean LOS (GMLOS) (days): 4.30  Days to GMLOS:3.5     OBJECTIVE:  PATIENT READMITTED TO HOSPITAL  Risk of Unplanned Readmission Score: 24.95      Current admission status: Inpatient  Referral Reason: Hospice    Preferred Pharmacy:   [address] [place] [address] [address]  [address] [place] [address]  Phone: [phone] Fax: [phone]    Primary Care Provider: AMANDA Jewell    Primary Insurance: MEDICARE  Secondary Insurance:     ASSESSMENT:  [address] [facility] [address], Rodney [place] Representative - Daughter   Primary Phone: [phone] (Mobile)               Advance Directives  Does patient have a [address]?: Yes  Does patient have Advance Directives?: Yes  Advance Directives: Living will, Power of  for health care  Primary Contact: Tom Kothari (Daughter)   [phone] (Mobile)    Readmission Root Cause  30 Day Readmission: Yes  Who directed you to return to the hospital?: Other (comment) (facility)  Did you understand whom to contact if you had questions or problems?: Yes  Did you get your prescriptions before you left the hospital?: Yes  Were you able to get your prescriptions filled when you left the hospital?: Yes  Did you take your medications as prescribed?: Yes  Were you able to get to your follow-up appointments?: Yes  During previous admission, was a post-acute recommendation made?: Yes  What post-acute resources were offered?: [facility]  Patient was readmitted due to: Stroke-like symptoms  Action Plan: Hospice Care    Patient Information  Admitted from[de-identified] Facility  Mental Status: Alert  During Assessment patient was accompanied by: Not accompanied during assessment  Assessment information provided by[de-identified] Patient, Daughter  Primary Caregiver: Other (Comment) ([facility])  [address] [facility] Name[de-identified] Staff at [employer] Relationship to Patient[de-identified] Other (Specify) (Staff at facility)  [address] [facility] Number[de-identified] [phone]  Support Systems: Self, Daughter, Son, Family members, Friend, Other (Comment) (Staff at [address] [place] [address])  [place] of Residence: Critical access hospital do you live in?: [address] [facility] entry access options. Select all that apply.: No steps to enter home  Type of Current Residence: Facility  Upon entering residence, is there a bedroom on the main floor (no further steps)?: Yes  Upon entering residence, is there a bathroom on the main floor (no further steps)?: Yes  In the last 12 months, was there a time when you were not able to pay the mortgage or rent on time?: No  In the last 12 months, how many places have you lived?: 1  In the last 12 months, was there a time when you did not have a steady place to sleep or slept in a shelter (including now)?: No  Homeless/housing insecurity resource given?: N/A  Living Arrangements: Other (Comment) (residential)  Is patient a ?: No    Activities of Daily Living Prior to Admission  Functional Status: Total dependent  Completes ADLs independently?: No  Level of ADL dependence: Total Dependent  Ambulates independently?: No  Level of ambulatory dependence: Assistance  Does patient use assisted devices?: Yes  Assisted Devices (DME) used:  Wheelchair  Does patient currently own DME?: Yes  What DME does the patient currently own?: Wheelchair, [employer]  Does patient have a history of Outpatient Therapy (PT/OT)?: No  Does the patient have a history of Short-Term Rehab?: No  Does patient have a history of HHC?: Yes ([facility]Z)  Does patient currently have [address]?: Yes (ROZ)    Current Home Health Care  Type of Current Home Care Services: [address][de-identified] Other (please enter name in comment) ([address])  [address] [facility] Provider[de-identified] PCP    Patient Information Continued  Income Source: Pension/long-term  Does patient have prescription coverage?: Yes  Within the past 12 months, you worried that your food would run out before you got the money to buy more.: Never true  Within the past 12 months, the food you bought just didn't last and you didn't have money to get more.: Never true  Does patient receive dialysis treatments?: No  Does patient have a history of substance abuse?: No  Does patient have a history of Mental Health Diagnosis?: No    Means of Transportation  Means of Transport to Appts[de-identified] Family transport  In the past 12 months, has lack of transportation kept you from medical appointments or from getting medications?: No  In the past 12 months, has lack of transportation kept you from meetings, work, or from getting things needed for daily living?: No    DISCHARGE DETAILS:    Discharge planning discussed with[de-identified] Patient and dtr Dave Ritchie  [place] of Choice: Yes  Comments - [place] of Choice: [address] contacted family/caregiver?: Yes  Were Treatment Team discharge recommendations reviewed with patient/caregiver?: Yes  Did patient/caregiver verbalize understanding of patient care needs?: Yes  Were patient/caregiver advised of the risks associated with not following Treatment Team discharge recommendations?: Yes    Contacts  Patient Contacts: Kaleb Session (Daughter)   [phone] (Mobile)  Relationship to Patient[de-identified] Family  Contact Method: Phone  Phone Number: Kaleb Session (Daughter)   [phone] (Mobile)  Reason/Outcome: Emergency Contact, Discharge Planning    Requested [address]         Is the patient interested in [facility] at discharge?: No    Other Referral/Resources/Interventions Provided:  Interventions: Hospice    Treatment Team Recommendation: Facility Return        Additional Comments: Message from Dr Ligia Salguero consult recieved for hospice care. Met with patient at bedside. Raúl historian. Patient resides at [facility] on dementia unit. Call to Dtr Dalzell Party to discuss hospice care. Ericka Party is familialr with hospice services. Agreeable to hosp[facility]. [address] [facility] accepted and reserved via [employer]   . Explained liaison will reach out to discuss services. Call to [facility] left  requesting CB. CM department following thru discharge.

## 2023-08-12 NOTE — ASSESSMENT & PLAN NOTE
· Noted by leukocytosis and tachycardia present on admission.   Lactic acid negative  · Unclear etiology of infection UA negative leuks nitrate, no acute infectious process on imaging  · Patient was started on empiric ceftriaxone-we will continue the same  · White blood cell count has improved from 18.02 at time of arrival to 14.70  · Procalcitonin testing has improved from 1.24-0.82  · We will follow-up on blood cultures

## 2023-08-12 NOTE — ASSESSMENT & PLAN NOTE
· CT: Previously seen right upper lobe spiculated nodule measuring 2.6 x 1.8 cm similar to the prior exam. PET/CT is again recommended.   · Await family decision regarding how aggressive they would like to be  · At baseline, patient would not be a good candidate for any type of chemo and/or radiation therapy in view of his advancing age, comorbid medical conditions, and overall generalized deconditioning

## 2023-08-12 NOTE — ASSESSMENT & PLAN NOTE
Noted by leukocytosis and tachycardia present on admission.   Lactic acid negative  Unclear etiology of infection UA negative leuks nitrate, no acute infectious process on imaging  · Follow-up blood cultures  · Check procalcitonin x2  · Start Rocephin  · IV fluid

## 2023-08-12 NOTE — ASSESSMENT & PLAN NOTE
With chronic kidney disease stage IIIa  · Creatinine baseline 1-1.2, currently 1.4  · IV fluids and monitor

## 2023-08-12 NOTE — ASSESSMENT & PLAN NOTE
· Symptoms are more consistent with an acute toxic/metabolic encephalopathy, however the patient was admitted for stroke to be ruled out  · Patient is currently alert, awake, and oriented to person only, not to place, not to time  · Work-up thus far: -   · #1. CT brain-1.  No acute intracranial CT abnormality. 2.  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 · #2. CTA stroke alert head and neck-1.  Patent major vessels of the [place] of [place] without high-grade stenosis.  No aneurysm. 2.  No hemodynamically significant stenosis or dissection of cervical carotid and vertebral arteries. Normal variant hypoplastic left vertebral artery. 3.  No significant change in 2.8 cm right upper lobe spiculated nodule from chest CT 6/27/2023. Given short interval, this is still suspicious for malignancy. Recommend either PET/CT, close follow-up chest CT (3 months from prior CT 6/27/2023), or   sampling. · #3. MRI brain-pending  · #4. Formal neurology evaluation-pending  · #5. Fasting lipid profile-reviewed  · #6. HbA1c is pending  · #7.   PT and OT eval is pending  · Continue aspirin, and Lipitor at this time for secondary prevention

## 2023-08-12 NOTE — PLAN OF CARE
Problem: MOBILITY - ADULT  Goal: Maintain or return to baseline ADL function  Description: INTERVENTIONS:  -  Assess patient's ability to carry out ADLs; assess patient's baseline for ADL function and identify physical deficits which impact ability to perform ADLs (bathing, care of mouth/teeth, toileting, grooming, dressing, etc.)  - Assess/evaluate cause of self-care deficits   - Assess range of motion  - Assess patient's mobility; develop plan if impaired  - Assess patient's need for assistive devices and provide as appropriate  - Encourage maximum independence but intervene and supervise when necessary  - Involve family in performance of ADLs  - Assess for home care needs following discharge   - Consider OT consult to assist with ADL evaluation and planning for discharge  - Provide patient education as appropriate  Outcome: Progressing  Goal: Maintains/Returns to pre admission functional level  Description: INTERVENTIONS:  - Perform BMAT or MOVE assessment daily.   - Set and communicate daily mobility goal to care team and patient/family/caregiver.    - Collaborate with rehabilitation services on mobility goals if consulted  - Out of bed for toileting  - Record patient progress and toleration of activity level   Outcome: Progressing     Problem: PAIN - ADULT  Goal: Verbalizes/displays adequate comfort level or baseline comfort level  Description: Interventions:  - Encourage patient to monitor pain and request assistance  - Assess pain using appropriate pain scale  - Administer analgesics based on type and severity of pain and evaluate response  - Implement non-pharmacological measures as appropriate and evaluate response  - Consider cultural and social influences on pain and pain management  - Notify physician/advanced practitioner if interventions unsuccessful or patient reports new pain  Outcome: Progressing     Problem: INFECTION - ADULT  Goal: Absence or prevention of progression during hospitalization  Description: INTERVENTIONS:  - Assess and monitor for signs and symptoms of infection  - Monitor lab/diagnostic results  - Monitor all insertion sites, i.e. indwelling lines, tubes, and drains  - Monitor endotracheal if appropriate and nasal secretions for changes in amount and color  - Anthon appropriate cooling/warming therapies per order  - Administer medications as ordered  - Instruct and encourage patient and family to use good hand hygiene technique  - Identify and instruct in appropriate isolation precautions for identified infection/condition  Outcome: Progressing  Goal: Absence of fever/infection during neutropenic period  Description: INTERVENTIONS:  - Monitor WBC    Outcome: Progressing     Problem: SAFETY ADULT  Goal: Maintain or return to baseline ADL function  Description: INTERVENTIONS:  -  Assess patient's ability to carry out ADLs; assess patient's baseline for ADL function and identify physical deficits which impact ability to perform ADLs (bathing, care of mouth/teeth, toileting, grooming, dressing, etc.)  - Assess/evaluate cause of self-care deficits   - Assess range of motion  - Assess patient's mobility; develop plan if impaired  - Assess patient's need for assistive devices and provide as appropriate  - Encourage maximum independence but intervene and supervise when necessary  - Involve family in performance of ADLs  - Assess for home care needs following discharge   - Consider OT consult to assist with ADL evaluation and planning for discharge  - Provide patient education as appropriate  Outcome: Progressing  Goal: Maintains/Returns to pre admission functional level  Description: INTERVENTIONS:  - Perform BMAT or MOVE assessment daily.   - Set and communicate daily mobility goal to care team and patient/family/caregiver. - Collaborate with rehabilitation services on mobility goals if consulted  - Reposition patient every 2 hours.   - Record patient progress and toleration of activity level   Outcome: Progressing  Goal: Patient will remain free of falls  Description: INTERVENTIONS:  - Educate patient/family on patient safety including physical limitations  - Instruct patient to call for assistance with activity   - Consult OT/PT to assist with strengthening/mobility   - Keep Call bell within reach  - Keep bed low and locked with side rails adjusted as appropriate  - Keep care items and personal belongings within reach  - Initiate and maintain comfort rounds  - Make Fall Risk Sign visible to staff  - Offer Toileting every 2 Hours, in advance of need  - Initiate/Maintain bed/chair alarm  - Apply yellow socks and bracelet for high fall risk patients  - Consider moving patient to room near nurses station  Outcome: Progressing     Problem: DISCHARGE PLANNING  Goal: Discharge to home or other facility with appropriate resources  Description: INTERVENTIONS:  - Identify barriers to discharge w/patient and caregiver  - Arrange for needed discharge resources and transportation as appropriate  - Identify discharge learning needs (meds, wound care, etc.)  - Arrange for interpretive services to assist at discharge as needed  - Refer to Case Management Department for coordinating discharge planning if the patient needs post-hospital services based on physician/advanced practitioner order or complex needs related to functional status, cognitive ability, or social support system  Outcome: Progressing     Problem: Knowledge Deficit  Goal: Patient/family/caregiver demonstrates understanding of disease process, treatment plan, medications, and discharge instructions  Description: Complete learning assessment and assess knowledge base.   Interventions:  - Provide teaching at level of understanding  - Provide teaching via preferred learning methods  Outcome: Progressing     Problem: Prexisting or High Potential for Compromised Skin Integrity  Goal: Skin integrity is maintained or improved  Description: INTERVENTIONS:  - Identify patients at risk for skin breakdown  - Assess and monitor skin integrity  - Assess and monitor nutrition and hydration status  - Monitor labs   - Assess for incontinence   - Turn and reposition patient  - Assist with mobility/ambulation  - Relieve pressure over bony prominences  - Avoid friction and shearing  - Provide appropriate hygiene as needed including keeping skin clean and dry  - Evaluate need for skin moisturizer/barrier cream  - Collaborate with interdisciplinary team   - Patient/family teaching  - Consider wound care consult   Outcome: Progressing

## 2023-08-12 NOTE — ASSESSMENT & PLAN NOTE
CT: Previously seen right upper lobe spiculated nodule measuring 2.6 x 1.8 cm similar to the prior exam. PET/CT is again recommended.

## 2023-08-12 NOTE — ASSESSMENT & PLAN NOTE
· Patient is currently alert, awake, and oriented x1 only  · Patient has multiple medical comorbid conditions-including a right upper lobe lesion suspicious for a cancer, and infrarenal abdominal aortic aneurysm, generalized deconditioning, chronic emphysema, BMI of 16, and advancing age of 81, and a 2.9 x 2.0 cm structure is seen in the right inguinal canal suspicious of herniated bowel, and a declining quality of life  · I reviewed these findings with the patient's daughter Micheline Carlos who is also the healthcare proxy, we reviewed the possibility of a palliative care evaluation, and/or a hospice evaluation  · Patient's daughter will be in to visit with her dad after which we will make a decision  · In the interim, the patient is a full DNR/DNI  · Continue other medical management until final decision making has been completed

## 2023-08-13 LAB
ANION GAP SERPL CALCULATED.3IONS-SCNC: 9 MMOL/L
BASOPHILS # BLD AUTO: 0.03 THOUSANDS/ÂΜL (ref 0–0.1)
BASOPHILS NFR BLD AUTO: 0 % (ref 0–1)
BUN SERPL-MCNC: 23 MG/DL (ref 5–25)
CALCIUM SERPL-MCNC: 8.5 MG/DL (ref 8.4–10.2)
CHLORIDE SERPL-SCNC: 110 MMOL/L (ref 96–108)
CO2 SERPL-SCNC: 20 MMOL/L (ref 21–32)
CREAT SERPL-MCNC: 1.07 MG/DL (ref 0.6–1.3)
EOSINOPHIL # BLD AUTO: 0 THOUSAND/ÂΜL (ref 0–0.61)
EOSINOPHIL NFR BLD AUTO: 0 % (ref 0–6)
ERYTHROCYTE [DISTWIDTH] IN BLOOD BY AUTOMATED COUNT: 14.4 % (ref 11.6–15.1)
EST. AVERAGE GLUCOSE BLD GHB EST-MCNC: 111 MG/DL
GFR SERPL CREATININE-BSD FRML MDRD: 62 ML/MIN/1.73SQ M
GLUCOSE SERPL-MCNC: 82 MG/DL (ref 65–140)
HBA1C MFR BLD: 5.5 %
HCT VFR BLD AUTO: 33.5 % (ref 36.5–49.3)
HGB BLD-MCNC: 10.8 G/DL (ref 12–17)
IMM GRANULOCYTES # BLD AUTO: 0.08 THOUSAND/UL (ref 0–0.2)
IMM GRANULOCYTES NFR BLD AUTO: 1 % (ref 0–2)
LYMPHOCYTES # BLD AUTO: 1.17 THOUSANDS/ÂΜL (ref 0.6–4.47)
LYMPHOCYTES NFR BLD AUTO: 10 % (ref 14–44)
MCH RBC QN AUTO: 32 PG (ref 26.8–34.3)
MCHC RBC AUTO-ENTMCNC: 32.2 G/DL (ref 31.4–37.4)
MCV RBC AUTO: 99 FL (ref 82–98)
MONOCYTES # BLD AUTO: 1.17 THOUSAND/ÂΜL (ref 0.17–1.22)
MONOCYTES NFR BLD AUTO: 10 % (ref 4–12)
NEUTROPHILS # BLD AUTO: 9.43 THOUSANDS/ÂΜL (ref 1.85–7.62)
NEUTS SEG NFR BLD AUTO: 79 % (ref 43–75)
NRBC BLD AUTO-RTO: 0 /100 WBCS
PLATELET # BLD AUTO: 200 THOUSANDS/UL (ref 149–390)
PMV BLD AUTO: 9.6 FL (ref 8.9–12.7)
POTASSIUM SERPL-SCNC: 3.9 MMOL/L (ref 3.5–5.3)
RBC # BLD AUTO: 3.38 MILLION/UL (ref 3.88–5.62)
SODIUM SERPL-SCNC: 139 MMOL/L (ref 135–147)
WBC # BLD AUTO: 11.88 THOUSAND/UL (ref 4.31–10.16)

## 2023-08-13 PROCEDURE — 85025 COMPLETE CBC W/AUTO DIFF WBC: CPT | Performed by: HOSPITALIST

## 2023-08-13 PROCEDURE — 99232 SBSQ HOSP IP/OBS MODERATE 35: CPT | Performed by: HOSPITALIST

## 2023-08-13 PROCEDURE — 80048 BASIC METABOLIC PNL TOTAL CA: CPT | Performed by: HOSPITALIST

## 2023-08-13 RX ORDER — ACETAMINOPHEN 325 MG/1
650 TABLET ORAL EVERY 6 HOURS PRN
Status: DISCONTINUED | OUTPATIENT
Start: 2023-08-13 | End: 2023-08-14 | Stop reason: HOSPADM

## 2023-08-13 RX ADMIN — Medication 1000 UNITS: at 09:16

## 2023-08-13 RX ADMIN — BUPROPION HYDROCHLORIDE 300 MG: 150 TABLET, EXTENDED RELEASE ORAL at 09:16

## 2023-08-13 RX ADMIN — ATORVASTATIN CALCIUM 40 MG: 40 TABLET, FILM COATED ORAL at 17:18

## 2023-08-13 RX ADMIN — HEPARIN SODIUM 5000 UNITS: 5000 INJECTION INTRAVENOUS; SUBCUTANEOUS at 22:46

## 2023-08-13 RX ADMIN — Medication 2 G: at 17:18

## 2023-08-13 RX ADMIN — OXYCODONE HYDROCHLORIDE AND ACETAMINOPHEN 500 MG: 500 TABLET ORAL at 22:46

## 2023-08-13 RX ADMIN — QUETIAPINE FUMARATE 25 MG: 25 TABLET ORAL at 09:16

## 2023-08-13 RX ADMIN — ASPIRIN 81 MG CHEWABLE TABLET 81 MG: 81 TABLET CHEWABLE at 09:16

## 2023-08-13 RX ADMIN — PANTOPRAZOLE SODIUM 40 MG: 40 TABLET, DELAYED RELEASE ORAL at 05:24

## 2023-08-13 RX ADMIN — HEPARIN SODIUM 5000 UNITS: 5000 INJECTION INTRAVENOUS; SUBCUTANEOUS at 05:24

## 2023-08-13 RX ADMIN — Medication 2 G: at 22:49

## 2023-08-13 RX ADMIN — LORAZEPAM 0.5 MG: 0.5 TABLET ORAL at 01:32

## 2023-08-13 RX ADMIN — QUETIAPINE FUMARATE 25 MG: 25 TABLET ORAL at 17:18

## 2023-08-13 RX ADMIN — FERROUS SULFATE TAB 325 MG (65 MG ELEMENTAL FE) 325 MG: 325 (65 FE) TAB at 09:16

## 2023-08-13 RX ADMIN — ACETAMINOPHEN 650 MG: 325 TABLET ORAL at 01:57

## 2023-08-13 RX ADMIN — OXYCODONE HYDROCHLORIDE AND ACETAMINOPHEN 500 MG: 500 TABLET ORAL at 09:16

## 2023-08-13 RX ADMIN — CEFTRIAXONE 2000 MG: 2 INJECTION, SOLUTION INTRAVENOUS at 22:44

## 2023-08-13 RX ADMIN — HEPARIN SODIUM 5000 UNITS: 5000 INJECTION INTRAVENOUS; SUBCUTANEOUS at 14:32

## 2023-08-13 NOTE — ASSESSMENT & PLAN NOTE
· Noted by leukocytosis and tachycardia present on admission.   Lactic acid negative  · Unclear etiology of infection UA negative leuks nitrate, no acute infectious process on imaging  · Patient was started on empiric ceftriaxone-we will continue the same-day 3  · White blood cell count 18.02>14.70>11.88  · Procalcitonin testing has improved from 1.24-0.82  · Blood cultures-no growth at 24 hours

## 2023-08-13 NOTE — ASSESSMENT & PLAN NOTE
· Symptoms are more consistent with an acute toxic/metabolic encephalopathy versus progression of Alzheimer's dementia, however the patient was admitted for stroke to be ruled out  · Patient is currently alert, awake, and oriented to person only, not to place, not to time  · Work-up thus far: -   · #1. CT brain-1.  No acute intracranial CT abnormality. 2.  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 · #2. CTA stroke alert head and neck-1.  Patent major vessels of the [place] without high-grade stenosis.  No aneurysm. 2.  No hemodynamically significant stenosis or dissection of cervical carotid and vertebral arteries. Normal variant hypoplastic left vertebral artery. 3.  No significant change in 2.8 cm right upper lobe spiculated nodule from chest CT 6/27/2023. Given short interval, this is still suspicious for malignancy. Recommend either PET/CT, close follow-up chest CT (3 months from prior CT 6/27/2023), or   sampling. · #3. MRI brain- canceled in view of patient being transitioned to hospice  · #4. Formal neurology evaluation- canceled in view of patient being transitioned to hospice  · #5. Fasting lipid profile-reviewed  · #6. HbA1c is 5.5%  · #7.   PT and OT eval is pending  · Continue aspirin, and Lipitor at this time for secondary prevention

## 2023-08-13 NOTE — PLAN OF CARE
Problem: MOBILITY - ADULT  Goal: Maintain or return to baseline ADL function  Description: INTERVENTIONS:  -  Assess patient's ability to carry out ADLs; assess patient's baseline for ADL function and identify physical deficits which impact ability to perform ADLs (bathing, care of mouth/teeth, toileting, grooming, dressing, etc.)  - Assess/evaluate cause of self-care deficits   - Assess range of motion  - Assess patient's mobility; develop plan if impaired  - Assess patient's need for assistive devices and provide as appropriate  - Encourage maximum independence but intervene and supervise when necessary  - Involve family in performance of ADLs  - Assess for home care needs following discharge   - Consider OT consult to assist with ADL evaluation and planning for discharge  - Provide patient education as appropriate  Outcome: Progressing  Goal: Maintains/Returns to pre admission functional level  Description: INTERVENTIONS:  - Perform BMAT or MOVE assessment daily.   - Set and communicate daily mobility goal to care team and patient/family/caregiver. - Collaborate with rehabilitation services on mobility goals if consulted  - Reposition patient every 2 hours.   - Record patient progress and toleration of activity level   Outcome: Progressing     Problem: PAIN - ADULT  Goal: Verbalizes/displays adequate comfort level or baseline comfort level  Description: Interventions:  - Encourage patient to monitor pain and request assistance  - Assess pain using appropriate pain scale  - Administer analgesics based on type and severity of pain and evaluate response  - Implement non-pharmacological measures as appropriate and evaluate response  - Consider cultural and social influences on pain and pain management  - Notify physician/advanced practitioner if interventions unsuccessful or patient reports new pain  Outcome: Progressing     Problem: INFECTION - ADULT  Goal: Absence or prevention of progression during hospitalization  Description: INTERVENTIONS:  - Assess and monitor for signs and symptoms of infection  - Monitor lab/diagnostic results  - Monitor all insertion sites, i.e. indwelling lines, tubes, and drains  - Monitor endotracheal if appropriate and nasal secretions for changes in amount and color  - [place] appropriate cooling/warming therapies per order  - Administer medications as ordered  - Instruct and encourage patient and family to use good hand hygiene technique  - Identify and instruct in appropriate isolation precautions for identified infection/condition  Outcome: Progressing  Goal: Absence of fever/infection during neutropenic period  Description: INTERVENTIONS:  - Monitor WBC    Outcome: Progressing     Problem: SAFETY ADULT  Goal: Maintain or return to baseline ADL function  Description: INTERVENTIONS:  -  Assess patient's ability to carry out ADLs; assess patient's baseline for ADL function and identify physical deficits which impact ability to perform ADLs (bathing, care of mouth/teeth, toileting, grooming, dressing, etc.)  - Assess/evaluate cause of self-care deficits   - Assess range of motion  - Assess patient's mobility; develop plan if impaired  - Assess patient's need for assistive devices and provide as appropriate  - Encourage maximum independence but intervene and supervise when necessary  - Involve family in performance of ADLs  - Assess for home care needs following discharge   - Consider OT consult to assist with ADL evaluation and planning for discharge  - Provide patient education as appropriate  Outcome: Progressing  Goal: Maintains/Returns to pre admission functional level  Description: INTERVENTIONS:  - Perform BMAT or MOVE assessment daily.   - Set and communicate daily mobility goal to care team and patient/family/caregiver. - Collaborate with rehabilitation services on mobility goals if consulted  - Reposition patient every 2 hours.   - Record patient progress and toleration of activity level   Outcome: Progressing  Goal: Patient will remain free of falls  Description: INTERVENTIONS:  - Educate patient/family on patient safety including physical limitations  - Instruct patient to call for assistance with activity   - Consult OT/PT to assist with strengthening/mobility   - Keep Call bell within reach  - Keep bed low and locked with side rails adjusted as appropriate  - Keep care items and personal belongings within reach  - Initiate and maintain comfort rounds  - Make Fall Risk Sign visible to staff  - Offer Toileting every 2 Hours, in advance of need  - Initiate/Maintain bed alarm  - Apply yellow socks and bracelet for high fall risk patients  - Consider moving patient to room near nurses station  Outcome: Progressing     Problem: DISCHARGE PLANNING  Goal: Discharge to home or other facility with appropriate resources  Description: INTERVENTIONS:  - Identify barriers to discharge w/patient and caregiver  - Arrange for needed discharge resources and transportation as appropriate  - Identify discharge learning needs (meds, wound care, etc.)  - Arrange for interpretive services to assist at discharge as needed  - Refer to Case Management Department for coordinating discharge planning if the patient needs post-hospital services based on physician/advanced practitioner order or complex needs related to functional status, cognitive ability, or social support system  Outcome: Progressing     Problem: Knowledge Deficit  Goal: Patient/family/caregiver demonstrates understanding of disease process, treatment plan, medications, and discharge instructions  Description: Complete learning assessment and assess knowledge base.   Interventions:  - Provide teaching at level of understanding  - Provide teaching via preferred learning methods  Outcome: Progressing     Problem: Prexisting or High Potential for Compromised Skin Integrity  Goal: Skin integrity is maintained or improved  Description: INTERVENTIONS:  - Identify patients at risk for skin breakdown  - Assess and monitor skin integrity  - Assess and monitor nutrition and hydration status  - Monitor labs   - Assess for incontinence   - Turn and reposition patient  - Assist with mobility/ambulation  - Relieve pressure over bony prominences  - Avoid friction and shearing  - Provide appropriate hygiene as needed including keeping skin clean and dry  - Evaluate need for skin moisturizer/barrier cream  - Collaborate with interdisciplinary team   - Patient/family teaching  - Consider wound care consult   Outcome: Progressing

## 2023-08-13 NOTE — ASSESSMENT & PLAN NOTE
· Patient is currently alert, awake, and oriented x1 only-in the setting of having advanced Alzheimer's dementia, patient lives in a dementia unit at a local care facility  · On Saturday, 8/12/2023, I had a long discussion with the patient's daughter Piter Koch who is the healthcare proxy, and with the patient's son whose name is also Bandar Gray on speaker phone, and we reviewed the patient's poor quality of life, and extensive multiple medical comorbid conditions including:  · Advanced Alzheimer's dementia,a right upper lobe lesion suspicious for a cancer, an infrarenal abdominal aortic aneurysm, generalized deconditioning, chronic emphysema, 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 · CODE STATUS was updated to DNR/DNI  · Case management will try and get the patient back to his facility with hospice on Monday, 8/14/2023  · No further aggressive inpatient testing, treatment, and/or work-up is needed.

## 2023-08-13 NOTE — PROGRESS NOTES
[address]  Progress Note  Name: Saintclair Barrow  MRN: [id number]  Unit/Bed#: -01 I Date of Admission: 8/11/2023   Date of Service: 8/13/2023 I Hospital Day: 2    Assessment/Plan   Goals of care, counseling/discussion  Assessment & Plan  · Patient is currently alert, awake, and oriented x1 only-in the setting of having advanced Alzheimer's dementia, patient lives in a dementia unit at a local care facility  · On Saturday, 8/12/2023, I had a long discussion with the patient's daughter Mariama Segal who is the healthcare proxy, and with the patient's son whose name is also Heidy Petties on speaker phone, and we reviewed the patient's poor quality of life, and extensive multiple medical comorbid conditions including:  · Advanced Alzheimer's dementia,a right upper lobe lesion suspicious for a cancer, an infrarenal abdominal aortic aneurysm, generalized deconditioning, chronic emphysema, 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 · CODE STATUS was updated to DNR/DNI  · Case management will try and get the patient back to his facility with hospice on Monday, 8/14/2023  · No further aggressive inpatient testing, treatment, and/or work-up is needed. SIRS (systemic inflammatory response syndrome) (HCC)  Assessment & Plan  · Noted by leukocytosis and tachycardia present on admission.   Lactic acid negative  · Unclear etiology of infection UA negative leuks nitrate, no acute infectious process on imaging  · Patient was started on empiric ceftriaxone-we will continue the same-day 3  · White blood cell count 18.02>14.70>11.88  · Procalcitonin testing has improved from 1.24-0.82  · Blood cultures-no growth at 24 hours    * Stroke-like symptoms  Assessment & Plan  · Symptoms are more consistent with an acute toxic/metabolic encephalopathy versus progression of Alzheimer's dementia, however the patient was admitted for stroke to be ruled out  · Patient is currently alert, awake, and oriented to person only, not to place, not to time  · Work-up thus far: -   · #1. CT brain-1.  No acute intracranial CT abnormality. 2.  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 · #2. CTA stroke alert head and neck-1.  Patent major vessels of the [place] without high-grade stenosis.  No aneurysm. 2.  No hemodynamically significant stenosis or dissection of cervical carotid and vertebral arteries. Normal variant hypoplastic left vertebral artery. 3.  No significant change in 2.8 cm right upper lobe spiculated nodule from chest CT 6/27/2023. Given short interval, this is still suspicious for malignancy. Recommend either PET/CT, close follow-up chest CT (3 months from prior CT 6/27/2023), or   sampling. · #3. MRI brain- canceled in view of patient being transitioned to hospice  · #4. Formal neurology evaluation- canceled in view of patient being transitioned to hospice  · #5. Fasting lipid profile-reviewed  · #6. HbA1c is 5.5%  · #7.   PT and OT eval is pending  · Continue aspirin, and Lipitor at this time for secondary prevention      Hyponatremia  Assessment & Plan  · Hypovolemic hyponatremia that has resolved with IV fluid    Elevated creatine kinase  Assessment & Plan  · Mild acute kidney injury present on admission  · Baseline creatinine is 1-1.2  · Arrival creatinine 1.46  · Current creatinine 1.07  · No further blood draws    Nodule of right lung  Assessment & Plan  · CT: Previously seen right upper lobe spiculated nodule measuring 2.6 x 1.8 cm similar to the prior exam. PET/CT is again recommended. · Await family decision regarding how aggressive they would like to be  · At baseline, patient would not be a good candidate for any type of chemo and/or radiation therapy in view of his advancing age, comorbid medical conditions, and overall generalized deconditioning  · No further work-up in view of patient being transitioned to hospice             VTE Prophylaxis:  Heparin    Patient Centered Rounds: I have performed bedside rounds with nursing staff today. Discussions with Specialists or Other Care Team Provider: Case management, nursing  Education and Discussions with Family / Patient: Attempts to call the patient's daughter today at 9:22 AM-no answer    Current Length of Stay: 2 day(s)    Current Patient Status: Inpatient   Certification Statement: The patient will continue to require additional inpatient hospital stay due to The need for hospice evaluation    Discharge Plan: Hopeful discharge planning for Monday, 2023    Code Status: Level 3 - DNAR and DNI    Subjective:   Patient seen, resting in bed, remains confused, is alert, awake, and oriented x1    Objective:     Vitals:   Temp (24hrs), Av.7 °F (36.5 °C), Min:97 °F (36.1 °C), Max:98.1 °F (36.7 °C)    Temp:  [97 °F (36.1 °C)-98.1 °F (36.7 °C)] 97 °F (36.1 °C)  HR:  [74-91] 74  Resp:  [18-20] 20  BP: (116-144)/(65-88) 133/65  SpO2:  [91 %-95 %] 91 %  Body mass index is 16.97 kg/m². Input and Output Summary (last 24 hours): Intake/Output Summary (Last 24 hours) at 2023 0920  Last data filed at 2023 1700  Gross per 24 hour   Intake --   Output 725 ml   Net -725 ml       Physical Exam:   Physical Exam  Vitals and nursing note reviewed. Constitutional:       General: He is not in acute distress. Appearance: Normal appearance. He is not ill-appearing. HENT:      Head: Normocephalic and atraumatic.       Nose: Nose normal.   Eyes: Extraocular Movements: Extraocular movements intact. Pupils: Pupils are equal, round, and reactive to light. Cardiovascular:      Rate and Rhythm: Normal rate and regular rhythm. Pulses: Normal pulses. Heart sounds: Normal heart sounds. No murmur heard. No friction rub. No gallop. Pulmonary:      Effort: Pulmonary effort is normal.      Breath sounds: Normal breath sounds. Abdominal:      General: There is no distension. Palpations: Abdomen is soft. There is no mass. Tenderness: There is no abdominal tenderness. There is no guarding or rebound. Musculoskeletal:         General: No swelling or tenderness. Normal range of motion. Cervical back: Normal range of motion and neck supple. No rigidity. No muscular tenderness. Right lower leg: No edema. Left lower leg: No edema. Skin:     General: Skin is warm. Capillary Refill: Capillary refill takes less than 2 seconds. Findings: No erythema or rash. Neurological:      General: No focal deficit present. Mental Status: He is alert. Mental status is at baseline.       Comments: Demented at baseline   Psychiatric:         Mood and Affect: Mood normal.         Behavior: Behavior normal.         Additional Data:     Labs:    Results from last 7 days   Lab Units 08/13/23  0531   WBC Thousand/uL 11.88*   HEMOGLOBIN g/dL 10.8*   HEMATOCRIT % 33.5*   PLATELETS Thousands/uL 200   NEUTROS PCT % 79*   LYMPHS PCT % 10*   MONOS PCT % 10   EOS PCT % 0     Results from last 7 days   Lab Units 08/13/23  0531   SODIUM mmol/L 139   POTASSIUM mmol/L 3.9   CHLORIDE mmol/L 110*   CO2 mmol/L 20*   BUN mg/dL 23   CREATININE mg/dL 1.07   CALCIUM mg/dL 8.5     Results from last 7 days   Lab Units 08/11/23  1920   INR  1.31*     Results from last 7 days   Lab Units 08/11/23  1918   POC GLUCOSE mg/dl 127     Results from last 7 days   Lab Units 08/12/23  0442   HEMOGLOBIN A1C % 5.5       * I Have Reviewed All Lab Data Listed Above.  * Additional Pertinent Lab Tests Reviewed: [address] Admission  Reviewed    Imaging:  Imaging Reports Reviewed Today Include: None    Recent Cultures (last 7 days):     Results from last 7 days   Lab Units 08/11/23 1952 08/11/23 1920   BLOOD CULTURE  No Growth at 24 hrs. Received in Microbiology Lab. Culture in Progress. Last 24 Hours Medication List:   Current Facility-Administered Medications   Medication Dose Route Frequency Provider Last Rate   • acetaminophen  650 mg Oral Q6H PRN Steph Samuel PA-C     • ascorbic acid  500 mg Oral BID Steph Samuel PA-C     • aspirin  81 mg Oral Daily [address] [place] [address]     • atorvastatin  40 mg Oral QPM Steph Samuel PA-C     • buPROPion  300 mg Oral Daily [address] [place] [address]     • cefTRIAXone  2,000 mg Intravenous Q24H [address] [place] [address], PA-C 2,000 mg (08/12/23 2158)   • cholecalciferol  1,000 Units Oral Daily Steph Samuel PA-C     • Diclofenac Sodium  2 g Topical 4x Daily LINNETTE Rashid-C     • ferrous sulfate  325 mg Oral Daily With Breakfast LINNETTE Rashid-C     • heparin (porcine)  5,000 Units Subcutaneous [address]     • LORazepam  0.5 mg Oral Q6H PRN Steph Samuel PA-C     • pantoprazole  40 mg Oral Early Morning Steph Samuel PA-C     • QUEtiapine  25 mg Oral BID LINNETTE Rashid-C          Today, Patient Was Seen By: Hang Olmos MD    ** Please Note: Dictation voice to text software may have been used in the creation of this document.  **

## 2023-08-13 NOTE — PLAN OF CARE
Problem: INFECTION - ADULT  Goal: Absence or prevention of progression during hospitalization  Description: INTERVENTIONS:  - Assess and monitor for signs and symptoms of infection  - Monitor lab/diagnostic results  - Monitor all insertion sites, i.e. indwelling lines, tubes, and drains  - Monitor endotracheal if appropriate and nasal secretions for changes in amount and color  - [place] appropriate cooling/warming therapies per order  - Administer medications as ordered  - Instruct and encourage patient and family to use good hand hygiene technique  - Identify and instruct in appropriate isolation precautions for identified infection/condition  Outcome: Progressing  Goal: Absence of fever/infection during neutropenic period  Description: INTERVENTIONS:  - Monitor WBC    Outcome: Progressing

## 2023-08-13 NOTE — ASSESSMENT & PLAN NOTE
· CT: Previously seen right upper lobe spiculated nodule measuring 2.6 x 1.8 cm similar to the prior exam. PET/CT is again recommended.   · Await family decision regarding how aggressive they would like to be  · At baseline, patient would not be a good candidate for any type of chemo and/or radiation therapy in view of his advancing age, comorbid medical conditions, and overall generalized deconditioning  · No further work-up in view of patient being transitioned to hospice

## 2023-08-13 NOTE — ASSESSMENT & PLAN NOTE
· Mild acute kidney injury present on admission  · Baseline creatinine is 1-1.2  · Arrival creatinine 1.46  · Current creatinine 1.07  · No further blood draws

## 2023-08-14 ENCOUNTER — HOME CARE VISIT (OUTPATIENT)
Dept: HOME HEALTH SERVICES | Facility: HOME HEALTHCARE | Age: 88
End: 2023-08-14
Payer: MEDICARE

## 2023-08-14 VITALS
RESPIRATION RATE: 18 BRPM | DIASTOLIC BLOOD PRESSURE: 67 MMHG | WEIGHT: 121 LBS | BODY MASS INDEX: 16.88 KG/M2 | HEART RATE: 90 BPM | SYSTOLIC BLOOD PRESSURE: 112 MMHG

## 2023-08-14 VITALS
HEIGHT: 71 IN | SYSTOLIC BLOOD PRESSURE: 143 MMHG | HEART RATE: 69 BPM | RESPIRATION RATE: 18 BRPM | BODY MASS INDEX: 17.04 KG/M2 | TEMPERATURE: 98.4 F | OXYGEN SATURATION: 98 % | WEIGHT: 121.69 LBS | DIASTOLIC BLOOD PRESSURE: 69 MMHG

## 2023-08-14 LAB
FLUAV RNA RESP QL NAA+PROBE: NEGATIVE
FLUBV RNA RESP QL NAA+PROBE: NEGATIVE
RSV RNA RESP QL NAA+PROBE: NEGATIVE
SARS-COV-2 RNA RESP QL NAA+PROBE: NEGATIVE

## 2023-08-14 PROCEDURE — 99239 HOSP IP/OBS DSCHRG MGMT >30: CPT | Performed by: INTERNAL MEDICINE

## 2023-08-14 PROCEDURE — G0299 HHS/HOSPICE OF RN EA 15 MIN: HCPCS

## 2023-08-14 PROCEDURE — 0241U HB NFCT DS VIR RESP RNA 4 TRGT: CPT | Performed by: INTERNAL MEDICINE

## 2023-08-14 RX ORDER — LEVALBUTEROL INHALATION SOLUTION 1.25 MG/3ML
1.25 SOLUTION RESPIRATORY (INHALATION)
Qty: 270 ML | Refills: 0
Start: 2023-08-14 | End: 2023-08-24 | Stop reason: CLARIF

## 2023-08-14 RX ADMIN — ASPIRIN 81 MG CHEWABLE TABLET 81 MG: 81 TABLET CHEWABLE at 08:32

## 2023-08-14 RX ADMIN — Medication 2 G: at 12:01

## 2023-08-14 RX ADMIN — Medication 1000 UNITS: at 08:33

## 2023-08-14 RX ADMIN — BUPROPION HYDROCHLORIDE 300 MG: 150 TABLET, EXTENDED RELEASE ORAL at 08:32

## 2023-08-14 RX ADMIN — QUETIAPINE FUMARATE 25 MG: 25 TABLET ORAL at 08:33

## 2023-08-14 RX ADMIN — HEPARIN SODIUM 5000 UNITS: 5000 INJECTION INTRAVENOUS; SUBCUTANEOUS at 06:13

## 2023-08-14 RX ADMIN — FERROUS SULFATE TAB 325 MG (65 MG ELEMENTAL FE) 325 MG: 325 (65 FE) TAB at 08:33

## 2023-08-14 RX ADMIN — Medication 2 G: at 08:33

## 2023-08-14 RX ADMIN — PANTOPRAZOLE SODIUM 40 MG: 40 TABLET, DELAYED RELEASE ORAL at 06:11

## 2023-08-14 RX ADMIN — OXYCODONE HYDROCHLORIDE AND ACETAMINOPHEN 500 MG: 500 TABLET ORAL at 08:33

## 2023-08-14 NOTE — ASSESSMENT & PLAN NOTE
· Noted by leukocytosis and tachycardia present on admission.   Lactic acid negative  · Unclear etiology of infection UA negative leuks nitrate, no acute infectious process on imaging  · Patient completed 4 days of ceftriaxone  · Leukocytosis has improved  · Procalcitonin testing has improved from 1.24-0.82  · Blood cultures-no growth at 24 hours  · No need for antibiotics on discharge

## 2023-08-14 NOTE — ASSESSMENT & PLAN NOTE
· Please see notes from previous provider regarding goals of care  · Patient is being discharged to assisted living facility with plan to transition to hospice

## 2023-08-14 NOTE — PHYSICAL THERAPY NOTE
Physical Therapy Cancellation Note         08/14/23 0952   PT Last Visit   PT Visit Date 08/14/23   Note Type   Note type Evaluation; Cancelled Session   Cancel Reasons Medical status  (as [facility] is transitioning to hospice care)     Martir Rivera

## 2023-08-14 NOTE — ASSESSMENT & PLAN NOTE
· Mild acute kidney injury present on admission  · Baseline creatinine is 1-1.2  · Arrival creatinine 1.46  · Creatinine improved  · No further blood draws as patient going to hospice

## 2023-08-14 NOTE — ASSESSMENT & PLAN NOTE
· CT: Previously seen right upper lobe spiculated nodule measuring 2.6 x 1.8 cm similar to the prior exam. PET/CT is again recommended.   · At baseline, patient would not be a good candidate for any type of chemo and/or radiation therapy in view of his advancing age, comorbid medical conditions, and overall generalized deconditioning  · No further work-up in view of patient being transitioned to hospice

## 2023-08-14 NOTE — PLAN OF CARE
Problem: INFECTION - ADULT  Goal: Absence or prevention of progression during hospitalization  Description: INTERVENTIONS:  - Assess and monitor for signs and symptoms of infection  - Monitor lab/diagnostic results  - Monitor all insertion sites, i.e. indwelling lines, tubes, and drains  - Monitor endotracheal if appropriate and nasal secretions for changes in amount and color  - [place] appropriate cooling/warming therapies per order  - Administer medications as ordered  - Instruct and encourage patient and family to use good hand hygiene technique  - Identify and instruct in appropriate isolation precautions for identified infection/condition  Outcome: Progressing  Goal: Absence of fever/infection during neutropenic period  Description: INTERVENTIONS:  - Monitor WBC    Outcome: Progressing

## 2023-08-14 NOTE — ASSESSMENT & PLAN NOTE
· Symptoms are more consistent with an acute toxic/metabolic encephalopathy versus progression of Alzheimer's dementia, however the patient was admitted for stroke to be ruled out  · Patient is currently alert, awake, and oriented to person only, not to place, not to time  · Work-up thus far: -   · #1. CT brain-1.  No acute intracranial CT abnormality. 2.  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 · #2. CTA stroke alert head and neck-1.  Patent major vessels of the [place] without high-grade stenosis.  No aneurysm. 2.  No hemodynamically significant stenosis or dissection of cervical carotid and vertebral arteries. Normal variant hypoplastic left vertebral artery. 3.  No significant change in 2.8 cm right upper lobe spiculated nodule from chest CT 6/27/2023. Given short interval, this is still suspicious for malignancy. Recommend either PET/CT, close follow-up chest CT (3 months from prior CT 6/27/2023), or   sampling. · #3. MRI brain- canceled in view of patient being transitioned to hospice  · #4. Formal neurology evaluation- canceled in view of patient being transitioned to hospice  · #5. Fasting lipid profile-reviewed  · #6.   HbA1c is 5.5%  · Patient being transitioned to hospice

## 2023-08-14 NOTE — PLAN OF CARE
Problem: MOBILITY - ADULT  Goal: Maintain or return to baseline ADL function  Description: INTERVENTIONS:  -  Assess patient's ability to carry out ADLs; assess patient's baseline for ADL function and identify physical deficits which impact ability to perform ADLs (bathing, care of mouth/teeth, toileting, grooming, dressing, etc.)  - Assess/evaluate cause of self-care deficits   - Assess range of motion  - Assess patient's mobility; develop plan if impaired  - Assess patient's need for assistive devices and provide as appropriate  - Encourage maximum independence but intervene and supervise when necessary  - Involve family in performance of ADLs  - Assess for home care needs following discharge   - Consider OT consult to assist with ADL evaluation and planning for discharge  - Provide patient education as appropriate  8/14/2023 1102 by Deepali Watt RN  Outcome: Adequate for Discharge  8/14/2023 1009 by Deepali Wtat RN  Outcome: Progressing  Goal: Maintains/Returns to pre admission functional level  Description: INTERVENTIONS:  - Perform BMAT or MOVE assessment daily.   - Set and communicate daily mobility goal to care team and patient/family/caregiver. - Collaborate with rehabilitation services on mobility goals if consulted  - Perform Range of Motion 3 times a day. - Reposition patient every 2 hours.   - Dangle patient 3 times a day  - Stand patient 3 times a day  - Ambulate patient 3 times a day  - Out of bed to chair 3 times a day   - Out of bed for meals 3 times a day  - Out of bed for toileting  - Record patient progress and toleration of activity level   8/14/2023 1102 by Deepali Watt RN  Outcome: Adequate for Discharge  8/14/2023 1009 by Deepali Watt RN  Outcome: Progressing     Problem: PAIN - ADULT  Goal: Verbalizes/displays adequate comfort level or baseline comfort level  Description: Interventions:  - Encourage patient to monitor pain and request assistance  - Assess pain using appropriate pain scale  - Administer analgesics based on type and severity of pain and evaluate response  - Implement non-pharmacological measures as appropriate and evaluate response  - Consider cultural and social influences on pain and pain management  - Notify physician/advanced practitioner if interventions unsuccessful or patient reports new pain  8/14/2023 1102 by Katlyn Bean RN  Outcome: Adequate for Discharge  8/14/2023 1009 by Katlyn Bean RN  Outcome: Progressing     Problem: INFECTION - ADULT  Goal: Absence or prevention of progression during hospitalization  Description: INTERVENTIONS:  - Assess and monitor for signs and symptoms of infection  - Monitor lab/diagnostic results  - Monitor all insertion sites, i.e. indwelling lines, tubes, and drains  - Monitor endotracheal if appropriate and nasal secretions for changes in amount and color  - [place] appropriate cooling/warming therapies per order  - Administer medications as ordered  - Instruct and encourage patient and family to use good hand hygiene technique  - Identify and instruct in appropriate isolation precautions for identified infection/condition  8/14/2023 1102 by Katlyn Bean RN  Outcome: Adequate for Discharge  8/14/2023 1009 by Katlyn Bean RN  Outcome: Progressing  Goal: Absence of fever/infection during neutropenic period  Description: INTERVENTIONS:  - Monitor WBC    8/14/2023 1102 by Katlyn Bean RN  Outcome: Adequate for Discharge  8/14/2023 1009 by Katlyn Bean RN  Outcome: Progressing     Problem: SAFETY ADULT  Goal: Maintain or return to baseline ADL function  Description: INTERVENTIONS:  -  Assess patient's ability to carry out ADLs; assess patient's baseline for ADL function and identify physical deficits which impact ability to perform ADLs (bathing, care of mouth/teeth, toileting, grooming, dressing, etc.)  - Assess/evaluate cause of self-care deficits   - Assess range of motion  - Assess patient's mobility; develop plan if impaired  - Assess patient's need for assistive devices and provide as appropriate  - Encourage maximum independence but intervene and supervise when necessary  - Involve family in performance of ADLs  - Assess for home care needs following discharge   - Consider OT consult to assist with ADL evaluation and planning for discharge  - Provide patient education as appropriate  8/14/2023 1102 by Luz Ziegler RN  Outcome: Adequate for Discharge  8/14/2023 1009 by Luz Ziegler RN  Outcome: Progressing  Goal: Maintains/Returns to pre admission functional level  Description: INTERVENTIONS:  - Perform BMAT or MOVE assessment daily.   - Set and communicate daily mobility goal to care team and patient/family/caregiver. - Collaborate with rehabilitation services on mobility goals if consulted  - Perform Range of Motion 3 times a day. - Reposition patient every 2 hours.   - Dangle patient 3 times a day  - Stand patient 3 times a day  - Ambulate patient 3 times a day  - Out of bed to chair 3 times a day   - Out of bed for meals 3 times a day  - Out of bed for toileting  - Record patient progress and toleration of activity level   8/14/2023 1102 by Luz Ziegler RN  Outcome: Adequate for Discharge  8/14/2023 1009 by Luz Ziegler RN  Outcome: Progressing  Goal: Patient will remain free of falls  Description: INTERVENTIONS:  - Educate patient/family on patient safety including physical limitations  - Instruct patient to call for assistance with activity   - Consult OT/PT to assist with strengthening/mobility   - Keep Call bell within reach  - Keep bed low and locked with side rails adjusted as appropriate  - Keep care items and personal belongings within reach  - Initiate and maintain comfort rounds  - Make Fall Risk Sign visible to staff  - Offer Toileting every 2 Hours, in advance of need  - Initiate/Maintain bed alarm  - Obtain necessary fall risk management equipment: bed alarm  - Apply yellow socks and bracelet for high fall risk patients  - Consider moving patient to room near nurses station  8/14/2023 1102 by Alissa James RN  Outcome: Adequate for Discharge  8/14/2023 1009 by Alissa James RN  Outcome: Progressing     Problem: DISCHARGE PLANNING  Goal: Discharge to home or other facility with appropriate resources  Description: INTERVENTIONS:  - Identify barriers to discharge w/patient and caregiver  - Arrange for needed discharge resources and transportation as appropriate  - Identify discharge learning needs (meds, wound care, etc.)  - Arrange for interpretive services to assist at discharge as needed  - Refer to Case Management Department for coordinating discharge planning if the patient needs post-hospital services based on physician/advanced practitioner order or complex needs related to functional status, cognitive ability, or social support system  8/14/2023 1102 by Alissa James RN  Outcome: Adequate for Discharge  8/14/2023 1009 by Alissa James RN  Outcome: Progressing     Problem: Knowledge Deficit  Goal: Patient/family/caregiver demonstrates understanding of disease process, treatment plan, medications, and discharge instructions  Description: Complete learning assessment and assess knowledge base.   Interventions:  - Provide teaching at level of understanding  - Provide teaching via preferred learning methods  8/14/2023 1102 by Alissa James RN  Outcome: Adequate for Discharge  8/14/2023 1009 by Alissa James RN  Outcome: Progressing     Problem: Prexisting or High Potential for Compromised Skin Integrity  Goal: Skin integrity is maintained or improved  Description: INTERVENTIONS:  - Identify patients at risk for skin breakdown  - Assess and monitor skin integrity  - Assess and monitor nutrition and hydration status  - Monitor labs   - Assess for incontinence   - Turn and reposition patient  - Assist with mobility/ambulation  - Relieve pressure over bony prominences  - Avoid friction and shearing  - Provide appropriate hygiene as needed including keeping skin clean and dry  - Evaluate need for skin moisturizer/barrier cream  - Collaborate with interdisciplinary team   - Patient/family teaching  - Consider wound care consult   8/14/2023 1102 by Vitaliy Bolden RN  Outcome: Adequate for Discharge  8/14/2023 1009 by Vitaliy Bolden RN  Outcome: Progressing

## 2023-08-14 NOTE — PLAN OF CARE
Problem: MOBILITY - ADULT  Goal: Maintain or return to baseline ADL function  Description: INTERVENTIONS:  -  Assess patient's ability to carry out ADLs; assess patient's baseline for ADL function and identify physical deficits which impact ability to perform ADLs (bathing, care of mouth/teeth, toileting, grooming, dressing, etc.)  - Assess/evaluate cause of self-care deficits   - Assess range of motion  - Assess patient's mobility; develop plan if impaired  - Assess patient's need for assistive devices and provide as appropriate  - Encourage maximum independence but intervene and supervise when necessary  - Involve family in performance of ADLs  - Assess for home care needs following discharge   - Consider OT consult to assist with ADL evaluation and planning for discharge  - Provide patient education as appropriate  Outcome: Progressing  Goal: Maintains/Returns to pre admission functional level  Description: INTERVENTIONS:  - Perform BMAT or MOVE assessment daily.   - Set and communicate daily mobility goal to care team and patient/family/caregiver. - Collaborate with rehabilitation services on mobility goals if consulted  - Perform Range of Motion 3 times a day. - Reposition patient every 2 hours.   - Dangle patient 3 times a day  - Stand patient 3 times a day  - Ambulate patient 3 times a day  - Out of bed to chair 3 times a day   - Out of bed for meals 3 times a day  - Out of bed for toileting  - Record patient progress and toleration of activity level   Outcome: Progressing     Problem: PAIN - ADULT  Goal: Verbalizes/displays adequate comfort level or baseline comfort level  Description: Interventions:  - Encourage patient to monitor pain and request assistance  - Assess pain using appropriate pain scale  - Administer analgesics based on type and severity of pain and evaluate response  - Implement non-pharmacological measures as appropriate and evaluate response  - Consider cultural and social influences on pain and pain management  - Notify physician/advanced practitioner if interventions unsuccessful or patient reports new pain  Outcome: Progressing     Problem: INFECTION - ADULT  Goal: Absence or prevention of progression during hospitalization  Description: INTERVENTIONS:  - Assess and monitor for signs and symptoms of infection  - Monitor lab/diagnostic results  - Monitor all insertion sites, i.e. indwelling lines, tubes, and drains  - Monitor endotracheal if appropriate and nasal secretions for changes in amount and color  - [place] appropriate cooling/warming therapies per order  - Administer medications as ordered  - Instruct and encourage patient and family to use good hand hygiene technique  - Identify and instruct in appropriate isolation precautions for identified infection/condition  Outcome: Progressing  Goal: Absence of fever/infection during neutropenic period  Description: INTERVENTIONS:  - Monitor WBC    Outcome: Progressing     Problem: SAFETY ADULT  Goal: Maintain or return to baseline ADL function  Description: INTERVENTIONS:  -  Assess patient's ability to carry out ADLs; assess patient's baseline for ADL function and identify physical deficits which impact ability to perform ADLs (bathing, care of mouth/teeth, toileting, grooming, dressing, etc.)  - Assess/evaluate cause of self-care deficits   - Assess range of motion  - Assess patient's mobility; develop plan if impaired  - Assess patient's need for assistive devices and provide as appropriate  - Encourage maximum independence but intervene and supervise when necessary  - Involve family in performance of ADLs  - Assess for home care needs following discharge   - Consider OT consult to assist with ADL evaluation and planning for discharge  - Provide patient education as appropriate  Outcome: Progressing  Goal: Maintains/Returns to pre admission functional level  Description: INTERVENTIONS:  - Perform BMAT or MOVE assessment daily.   - Set and communicate daily mobility goal to care team and patient/family/caregiver. - Collaborate with rehabilitation services on mobility goals if consulted  - Perform Range of Motion 3 times a day. - Reposition patient every 2 hours.   - Dangle patient 3 times a day  - Stand patient 3 times a day  - Ambulate patient 3 times a day  - Out of bed to chair 3 times a day   - Out of bed for meals 3 times a day  - Out of bed for toileting  - Record patient progress and toleration of activity level   Outcome: Progressing  Goal: Patient will remain free of falls  Description: INTERVENTIONS:  - Educate patient/family on patient safety including physical limitations  - Instruct patient to call for assistance with activity   - Consult OT/PT to assist with strengthening/mobility   - Keep Call bell within reach  - Keep bed low and locked with side rails adjusted as appropriate  - Keep care items and personal belongings within reach  - Initiate and maintain comfort rounds  - Make Fall Risk Sign visible to staff  - Offer Toileting every 2 Hours, in advance of need  - Initiate/Maintain bed alarm  - Obtain necessary fall risk management equipment: bed alarm   - Apply yellow socks and bracelet for high fall risk patients  - Consider moving patient to room near nurses station  Outcome: Progressing     Problem: DISCHARGE PLANNING  Goal: Discharge to home or other facility with appropriate resources  Description: INTERVENTIONS:  - Identify barriers to discharge w/patient and caregiver  - Arrange for needed discharge resources and transportation as appropriate  - Identify discharge learning needs (meds, wound care, etc.)  - Arrange for interpretive services to assist at discharge as needed  - Refer to Case Management Department for coordinating discharge planning if the patient needs post-hospital services based on physician/advanced practitioner order or complex needs related to functional status, cognitive ability, or social support system  Outcome: Progressing     Problem: Knowledge Deficit  Goal: Patient/family/caregiver demonstrates understanding of disease process, treatment plan, medications, and discharge instructions  Description: Complete learning assessment and assess knowledge base.   Interventions:  - Provide teaching at level of understanding  - Provide teaching via preferred learning methods  Outcome: Progressing     Problem: Prexisting or High Potential for Compromised Skin Integrity  Goal: Skin integrity is maintained or improved  Description: INTERVENTIONS:  - Identify patients at risk for skin breakdown  - Assess and monitor skin integrity  - Assess and monitor nutrition and hydration status  - Monitor labs   - Assess for incontinence   - Turn and reposition patient  - Assist with mobility/ambulation  - Relieve pressure over bony prominences  - Avoid friction and shearing  - Provide appropriate hygiene as needed including keeping skin clean and dry  - Evaluate need for skin moisturizer/barrier cream  - Collaborate with interdisciplinary team   - Patient/family teaching  - Consider wound care consult   Outcome: Progressing

## 2023-08-14 NOTE — CASE MANAGEMENT
Case Management Discharge Planning Note    Patient name Alexei Moore  Location /-71 MRN [id number]  : 1935 Date 2023       Current Admission Date: 2023  Current Admission Diagnosis:Stroke-like symptoms   Patient Active Problem List    Diagnosis Date Noted   • Goals of care, counseling/discussion 2023   • Stroke-like symptoms 2023   • Elevated creatine kinase 2023   • Hyponatremia 2023   • Nodule of right lung 07/10/2023   • SIRS (systemic inflammatory response syndrome) ([address] ) 07/10/2023      LOS (days): 3  Geometric Mean LOS (GMLOS) (days): 4.30  Days to GMLOS:1.8     OBJECTIVE:  Risk of Unplanned Readmission Score: 23.14         Current admission status: Inpatient   Preferred Pharmacy:   [address] [place] [address] [address]  [address] [place] [id number]  Phone: [phone] Fax: [phone]    Primary Care Provider: Michael Osorio [address] [place] [address]    Primary Insurance: MEDICARE  Secondary Insurance:     DISCHARGE DETAILS:    Discharge planning discussed with[de-identified] Patient edson Harrell from [facility] of Choice: Yes     CM contacted family/caregiver?: Yes  Were Treatment Team discharge recommendations reviewed with patient/caregiver?: Yes  Did patient/caregiver verbalize understanding of patient care needs?: Yes  Were patient/caregiver advised of the risks associated with not following Treatment Team discharge recommendations?: Yes    Contacts  Patient Contacts: Nic Kapoor (Daughter)   [phone] (Mobile)  Relationship to Patient[de-identified] Family  Contact Method: Phone  Phone Number: Nic Kapoor (Daughter)   [phone] (Mobile)  Reason/Outcome: Emergency Contact, Discharge Planning    Other Referral/Resources/Interventions Provided:  Interventions: Hospice, Transportation    Treatment Team Recommendation: Facility Return, Hospice  Discharge Destination Plan[de-identified] Facility Return, Hospice  Transport at Discharge : [facility]  Dispatcher Contacted: Yes  Number/Name of Dispatcher: Via Round [employer]  Transported by [employer] and Unit #): Jose  ETA of Transport (Date): 08/14/23  ETA of Transport (Time): 1100     Additional Comments: Spoke with Sarika Hunter at [facility] and discussed discharge planning. COVID test needed prior to discharge. Discussed with hospice liaison Juanis Mccoy and she will order DME as requested by facility hospital bed and bedside table. BLS transport scheduled for 11am.  Update to [facility] Dr Sheldon Glass and nurse Omer Mars. Call to dtr Karthik Soto and discussed discharge. All [facility] questions answered    Accepting Facility Name, [address] [place] [address] :  [facility]  Receiving Facility/Agency Phone Number: [phone]  Facility/Agency Fax Number: [phone] fax

## 2023-08-14 NOTE — CASE MANAGEMENT
Case Management Progress Note    Patient name Venkatesh Sharpe  Location /-30 MRN [id number]  : 1935 Date 2023       LOS (days): 3  Geometric Mean LOS (GMLOS) (days): 4.30  Days to GMLOS:1.9        OBJECTIVE:        Current admission status: Inpatient  Preferred Pharmacy:   [address] [place] [address] [facility]FFIL[facility]ATED [facility] [place] [address]  Phone: [phone] Fax: [phone]    Primary Care Provider: AMANDA Neumann    Primary Insurance: MEDICARE  Secondary Insurance:     PROGRESS NOTE:    Call to John at [facility] ([phone]) and left  requesting CB to discuss discharge planning.

## 2023-08-14 NOTE — DISCHARGE SUMMARY
[address]  Discharge- Aakash Martinez 1935, 81 y.o. male MRN: [id number]  Unit/Bed#: -01 Encounter: [id number]  Primary Care Provider: AMANDA Duffy   Date and time admitted to hospital: 8/11/2023  7:07 PM    * Stroke-like symptoms  Assessment & Plan  · Symptoms are more consistent with an acute toxic/metabolic encephalopathy versus progression of Alzheimer's dementia, however the patient was admitted for stroke to be ruled out  · Patient is currently alert, awake, and oriented to person only, not to place, not to time  · Work-up thus far: -   · #1. CT brain-1.  No acute intracranial CT abnormality. 2.  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 · #2. CTA stroke alert head and neck-1.  Patent major vessels of the [place] without high-grade stenosis.  No aneurysm. 2.  No hemodynamically significant stenosis or dissection of cervical carotid and vertebral arteries. Normal variant hypoplastic left vertebral artery. 3.  No significant change in 2.8 cm right upper lobe spiculated nodule from chest CT 6/27/2023. Given short interval, this is still suspicious for malignancy. Recommend either PET/CT, close follow-up chest CT (3 months from prior CT 6/27/2023), or   sampling. · #3. MRI brain- canceled in view of patient being transitioned to hospice  · #4. Formal neurology evaluation- canceled in view of patient being transitioned to hospice  · #5. Fasting lipid profile-reviewed  · #6.   HbA1c is 5.5%  · Patient being transitioned to hospice      Goals of care, counseling/discussion  Assessment & Plan  · Please see notes from previous provider regarding goals of care  · Patient is being discharged to assisted living facility with plan to transition to hospice    Elevated creatine kinase  Assessment & Plan  · Mild acute kidney injury present on admission  · Baseline creatinine is 1-1.2  · Arrival creatinine 1.46  · Creatinine improved  · No further blood draws as patient going to hospice    Hyponatremia  Assessment & Plan  · Hypovolemic hyponatremia that has resolved with IV fluid    SIRS (systemic inflammatory response syndrome) (HCC)  Assessment & Plan  · Noted by leukocytosis and tachycardia present on admission. Lactic acid negative  · Unclear etiology of infection UA negative leuks nitrate, no acute infectious process on imaging  · Patient completed 4 days of ceftriaxone  · Leukocytosis has improved  · Procalcitonin testing has improved from 1.24-0.82  · Blood cultures-no growth at 24 hours  · No need for antibiotics on discharge    Nodule of right lung  Assessment & Plan  · CT: Previously seen right upper lobe spiculated nodule measuring 2.6 x 1.8 cm similar to the prior exam. PET/CT is again recommended. · At baseline, patient would not be a good candidate for any type of chemo and/or radiation therapy in view of his advancing age, comorbid medical conditions, and overall generalized deconditioning  · No further work-up in view of patient being transitioned to hospice      Discharging Physician / Practitioner: Julianna Moralez MD  PCP: Shira Herr, [address] [place] [address]  Admission Date:   Admission Orders (From admission, onward)     Ordered        08/11/23 2103  Inpatient Admission  Once                      Discharge Date: 08/14/23    Medical Problems     Resolved Problems  Date Reviewed: 8/11/2023   None         Consultations During Hospital Stay:  · None    Procedures Performed:   · None    Significant Findings / Test Results:   · Suspected CVA    Incidental Findings:   · None    Test Results Pending at Discharge (will require follow up):    · None     Outpatient Tests Requested:  · Routine labs as needed    Complications:    • None    Reason for Admission: Suspected stroke    Hospital Course:     Daniel Naqvi is a 81 y.o. male patient who originally presented to the hospital on 8/11/2023 due to altered mental status. Patient admitted with suspected CVA. Patient was seen by neurology in the ER who recommended metabolic work-up as suspicion for stroke was low at that time. MRI brain was ordered. Also with suspected infection and started on antibiotics. Unclear source of infection identified however infectious parameters did improve. Cultures were negative to date. Given patient's advanced dementia and poor overall condition, goals of care were addressed with family. After discussion decision was made to transition patient to hospice. Case management arranged for patient to be transition to the [facility] with plan to transition to hospice. No further work-up/treatment indicated at this time. Please see above list of diagnoses and related plan for additional information. Condition at Discharge: stable     Discharge Day Visit / Exam:     Subjective: No complaints at this time    Vitals: Blood Pressure: 143/69 (08/14/23 0731)  Pulse: 69 (08/14/23 0731)  Temperature: 98.4 °F (36.9 °C) (08/14/23 0731)  Temp Source: Temporal (08/13/23 2230)  Respirations: 18 (08/14/23 0731)  Height: 5' 11" (180.3 cm) (08/11/23 2145)  Weight - Scale: 55.2 kg (121 lb 11.1 oz) (08/11/23 2145)  SpO2: 98 % (08/14/23 0900)     Exam:   Physical Exam  Constitutional:       General: He is not in acute distress. Comments: Frail elderly male   HENT:      Head: Normocephalic and atraumatic. Nose: Nose normal.      Mouth/Throat:      Mouth: Mucous membranes are dry. Eyes:      Extraocular Movements: Extraocular movements intact. Conjunctiva/sclera: Conjunctivae normal.   Cardiovascular:      Rate and Rhythm: Normal rate and regular rhythm. Pulmonary:      Effort: Pulmonary effort is normal. No respiratory distress. Abdominal:      Palpations: Abdomen is soft. Tenderness: There is no abdominal tenderness. Musculoskeletal:         General: Normal range of motion.       Cervical back: Normal range of motion and neck supple. Comments: Generalized weakness   Skin:     General: Skin is warm and dry. Neurological:      Mental Status: He is alert. Comments: Awake and alert. Following simple commands   Psychiatric:      Comments: Baseline advanced dementia         Discussion with Family: Spoke with patient's daughter over the phone regarding discharge plan    Discharge instructions/Information to patient and family:   See after visit summary for information provided to patient and family. Provisions for Follow-Up Care:  See after visit summary for information related to follow-up care and any pertinent home health orders. Disposition:     Assisted Living Facility at [facility] Readmission:   • no     Discharge Statement:  I spent 35 minutes discharging the patient. This time was spent on the day of discharge. I had direct contact with the patient on the day of discharge. Greater than 50% of the total time was spent examining patient, answering all patient questions, arranging and discussing plan of care with patient as well as directly providing post-discharge instructions. Additional time then spent on discharge activities. Discharge Medications:  See after visit summary for reconciled discharge medications provided to patient and family.       ** Please Note: This note has been constructed using a voice recognition system **

## 2023-08-15 ENCOUNTER — HOME CARE VISIT (OUTPATIENT)
Dept: HOME HEALTH SERVICES | Facility: HOME HEALTHCARE | Age: 88
End: 2023-08-15
Payer: MEDICARE

## 2023-08-15 PROCEDURE — G0156 HHCP-SVS OF AIDE,EA 15 MIN: HCPCS

## 2023-08-16 ENCOUNTER — HOME CARE VISIT (OUTPATIENT)
Dept: HOME HOSPICE | Facility: HOSPICE | Age: 88
End: 2023-08-16
Payer: MEDICARE

## 2023-08-16 ENCOUNTER — HOME CARE VISIT (OUTPATIENT)
Dept: HOME HEALTH SERVICES | Facility: HOME HEALTHCARE | Age: 88
End: 2023-08-16
Payer: MEDICARE

## 2023-08-16 PROCEDURE — G0156 HHCP-SVS OF AIDE,EA 15 MIN: HCPCS

## 2023-08-17 ENCOUNTER — HOME CARE VISIT (OUTPATIENT)
Dept: HOME HOSPICE | Facility: HOSPICE | Age: 88
End: 2023-08-17
Payer: MEDICARE

## 2023-08-17 ENCOUNTER — HOME CARE VISIT (OUTPATIENT)
Dept: HOME HEALTH SERVICES | Facility: HOME HEALTHCARE | Age: 88
End: 2023-08-17
Payer: MEDICARE

## 2023-08-17 VITALS
HEART RATE: 60 BPM | TEMPERATURE: 97.7 F | SYSTOLIC BLOOD PRESSURE: 136 MMHG | DIASTOLIC BLOOD PRESSURE: 80 MMHG | RESPIRATION RATE: 20 BRPM

## 2023-08-17 LAB
BACTERIA BLD CULT: NORMAL
BACTERIA BLD CULT: NORMAL

## 2023-08-17 PROCEDURE — G0156 HHCP-SVS OF AIDE,EA 15 MIN: HCPCS

## 2023-08-17 PROCEDURE — G0155 HHCP-SVS OF CSW,EA 15 MIN: HCPCS

## 2023-08-17 PROCEDURE — G0299 HHS/HOSPICE OF RN EA 15 MIN: HCPCS

## 2023-08-18 ENCOUNTER — HOME CARE VISIT (OUTPATIENT)
Dept: HOME HOSPICE | Facility: HOSPICE | Age: 88
End: 2023-08-18
Payer: MEDICARE

## 2023-08-18 ENCOUNTER — HOME CARE VISIT (OUTPATIENT)
Dept: HOME HEALTH SERVICES | Facility: HOME HEALTHCARE | Age: 88
End: 2023-08-18
Payer: MEDICARE

## 2023-08-18 PROCEDURE — G0156 HHCP-SVS OF AIDE,EA 15 MIN: HCPCS

## 2023-08-26 ENCOUNTER — HOME CARE VISIT (OUTPATIENT)
Dept: HOME HOSPICE | Facility: HOSPICE | Age: 88
End: 2023-08-26
Payer: MEDICARE

## 2023-08-29 ENCOUNTER — HOME CARE VISIT (OUTPATIENT)
Dept: HOME HOSPICE | Facility: HOSPICE | Age: 88
End: 2023-08-29
Payer: MEDICARE

## 2023-09-07 ENCOUNTER — HOME CARE VISIT (OUTPATIENT)
Dept: HOME HOSPICE | Facility: HOSPICE | Age: 88
End: 2023-09-07
Payer: MEDICARE